# Patient Record
Sex: FEMALE | Race: WHITE | NOT HISPANIC OR LATINO | Employment: FULL TIME | URBAN - METROPOLITAN AREA
[De-identification: names, ages, dates, MRNs, and addresses within clinical notes are randomized per-mention and may not be internally consistent; named-entity substitution may affect disease eponyms.]

---

## 2017-01-13 ENCOUNTER — MYC MEDICAL ADVICE (OUTPATIENT)
Dept: FAMILY MEDICINE | Facility: CLINIC | Age: 32
End: 2017-01-13

## 2017-04-09 ENCOUNTER — MYC REFILL (OUTPATIENT)
Dept: FAMILY MEDICINE | Facility: CLINIC | Age: 32
End: 2017-04-09

## 2017-04-09 DIAGNOSIS — E03.9 HYPOTHYROIDISM, UNSPECIFIED TYPE: Primary | ICD-10-CM

## 2017-04-10 NOTE — TELEPHONE ENCOUNTER
Message from [employer]:  Original authorizing provider: GREY CHRISTENSEN MD, MD Jackie Juan would like a refill of the following medications:  levothyroxine (SYNTHROID, LEVOTHROID) 75 MCG tablet [GREY CHRISTENSEN MD, MD]    Preferred pharmacy: [facility] [address] [place] [address] - [address]    Comment:  Please let me know if I need to make a lab appointment in order to renew this prescription. Thanks.

## 2017-04-10 NOTE — TELEPHONE ENCOUNTER
levothyroxine (SYNTHROID, LEVOTHROID) 75 MCG tablet     Last Written Prescription Date: 4/20/2016  Last Quantity: 90, # refills: 3  Last Office Visit with [facility], [facility] or [facility] prescribing provider: 4/19/2016        TSH   Date Value Ref Range Status   04/19/2016 3.09 0.40 - 4.00 mU/L Final

## 2017-04-11 RX ORDER — LEVOTHYROXINE SODIUM 75 UG/1
75 TABLET ORAL DAILY
Qty: 30 TABLET | Refills: 0 | Status: SHIPPED | OUTPATIENT
Start: 2017-04-11 | End: 2017-05-10

## 2017-04-11 NOTE — TELEPHONE ENCOUNTER
Medication is being filled for 1 time refill only due to:  Patient needs labs tsh. Patient needs to be seen because it has been more than one year since last visit.     Kimberly Curry, RN, BSN

## 2017-04-13 ENCOUNTER — TELEPHONE (OUTPATIENT)
Dept: FAMILY MEDICINE | Facility: CLINIC | Age: 32
End: 2017-04-13

## 2017-04-13 NOTE — TELEPHONE ENCOUNTER
Summary:    Patient is due/failing the following:   LDL,Physical and PAP    Action needed:   Patient needs office visit for PAP. and Patient needs fasting lab only appointment    Type of outreach:    Phone, left message for patient to call back.     Questions for provider review:    None                                                                                                                                    Shelly Ngo       Chart routed to Care Team .          Panel Management Review      Patient has the following on her problem list: None      Composite cancer screening  Chart review shows that this patient is due/due soon for the following Pap Smear

## 2017-05-03 NOTE — PROGRESS NOTES
"   SUBJECTIVE:     CC: Jackie Juan is an 31 year old woman who presents for preventive health visit.     Healthy Habits:    Do you get at least three servings of calcium containing foods daily (dairy, green leafy vegetables, etc.)? {YES/NO, DAIRY INTAKE:359641::\"yes\"}    Amount of exercise or daily activities, outside of work: {AMOUNT EXERCISE:383089}    Problems taking medications regularly {Yes /No default:124922::\"No\"}    Medication side effects: {Yes /No default.:052817::\"No\"}    Have you had an eye exam in the past two years? {YESNOBLANK:233824}    Do you see a dentist twice per year? {YESNOBLANK:131790}    Do you have sleep apnea, excessive snoring or daytime drowsiness?{YESNOBLANK:221278}    {Outside tests to abstract? :941678}    {additional problems to add:807773}    Today's PHQ-2 Score:   PHQ-2 ( 1999 Pfizer) 4/19/2016 6/8/2015   Q1: Little interest or pleasure in doing things 0 0   Q2: Feeling down, depressed or hopeless 0 0   PHQ-2 Score 0 0   Little interest or pleasure in doing things - -   Feeling down, depressed or hopeless - -   PHQ-2 Score - -     {PHQ-2 LOOK IN ASSESSMENTS :042801}  Abuse: Current or Past(Physical, Sexual or Emotional)- {YES/NO/NA:039263}  Do you feel safe in your environment - {YES/NO/NA:833652}    Social History   Substance Use Topics     Smoking status: Former Smoker     Types: Cigarettes     Start date: 9/8/2011     Quit date: 12/31/2013     Smokeless tobacco: Never Used      Comment: Use eCig occasionally, smoke 1 or 2 cigarettes a month     Alcohol use Yes      Comment: occiasional     {ETOH AUDIT:751646}    Recent Labs   Lab Test 02/09/16 01/20/15 03/04/14 03/25/13 02/28/12   CHOL  131  143  141  144  120   HDL  72  72  59  67  50   LDL   --    --    --    --   61.4   TRIG   --    --   <45  <45  43   CHOLHDLRATIO   --    --   2.4  2.1  2.4   NHDL  60   --    --    --    --        Reviewed orders with patient.  Reviewed health maintenance and updated orders accordingly - " "{Yes/No:264102::\"Yes\"}    Mammo Decision Support:  {Mammo:516869}    Pertinent mammograms are reviewed under the imaging tab.  History of abnormal Pap smear: {PAP HX:371092}    Reviewed and updated as needed this visit by clinical staff         Reviewed and updated as needed this visit by Provider        {HISTORY OPTIONS:037438}    ROS:  {FEMALE PREVENTATIVE ROS:124040}    {CHRONICPROBDATA:856390}  OBJECTIVE:     There were no vitals taken for this visit.  EXAM:  {Exam Choices:519730}    ASSESSMENT/PLAN:     {Diag Picklist:679432}    COUNSELING:   {FEMALE COUNSELING MESSAGES:866776::\"Reviewed preventive health counseling, as reflected in patient instructions\"}    {Blood Pressure Screenin}     reports that she quit smoking about 3 years ago. Her smoking use included Cigarettes. She started smoking about 5 years ago. She has never used smokeless tobacco.  {Tobacco Cessation needed for ACO -- Delete if patient is a non-smoker:930315}  Estimated body mass index is 31.03 kg/(m^2) as calculated from the following:    Height as of 16: 5' 8.75\" (1.746 m).    Weight as of 16: 208 lb 9.6 oz (94.6 kg).   {Weight Management Plan needed for ACO:589079}    Counseling Resources:  ATP IV Guidelines  Pooled Cohorts Equation Calculator  Breast Cancer Risk Calculator  FRAX Risk Assessment  ICSI Preventive Guidelines  Dietary Guidelines for Americans,   USDA's MyPlate  ASA Prophylaxis  Lung CA Screening    GREY CHRISTENSEN MD, MD  [facility] [place]"

## 2017-05-10 ENCOUNTER — OFFICE VISIT (OUTPATIENT)
Dept: FAMILY MEDICINE | Facility: CLINIC | Age: 32
End: 2017-05-10
Payer: COMMERCIAL

## 2017-05-10 VITALS
HEIGHT: 69 IN | RESPIRATION RATE: 20 BRPM | TEMPERATURE: 98.2 F | WEIGHT: 223 LBS | HEART RATE: 80 BPM | DIASTOLIC BLOOD PRESSURE: 88 MMHG | SYSTOLIC BLOOD PRESSURE: 110 MMHG | BODY MASS INDEX: 33.03 KG/M2

## 2017-05-10 DIAGNOSIS — Z13.1 SCREENING FOR DIABETES MELLITUS: ICD-10-CM

## 2017-05-10 DIAGNOSIS — E78.5 HYPERLIPIDEMIA LDL GOAL <160: ICD-10-CM

## 2017-05-10 DIAGNOSIS — D68.51 FACTOR 5 LEIDEN MUTATION, HETEROZYGOUS (H): ICD-10-CM

## 2017-05-10 DIAGNOSIS — Z00.00 ENCOUNTER FOR ROUTINE ADULT HEALTH EXAMINATION WITHOUT ABNORMAL FINDINGS: Primary | ICD-10-CM

## 2017-05-10 DIAGNOSIS — E03.9 HYPOTHYROIDISM, UNSPECIFIED TYPE: ICD-10-CM

## 2017-05-10 DIAGNOSIS — B00.1 RECURRENT COLD SORES: ICD-10-CM

## 2017-05-10 DIAGNOSIS — Z30.09 GENERAL COUNSELING AND ADVICE FOR CONTRACEPTIVE MANAGEMENT: ICD-10-CM

## 2017-05-10 LAB
CHOLEST SERPL-MCNC: 167 MG/DL
GLUCOSE SERPL-MCNC: 91 MG/DL (ref 70–99)
HDLC SERPL-MCNC: 87 MG/DL
LDLC SERPL CALC-MCNC: 73 MG/DL
NONHDLC SERPL-MCNC: 80 MG/DL
TRIGL SERPL-MCNC: 37 MG/DL
TSH SERPL DL<=0.005 MIU/L-ACNC: 2.1 MU/L (ref 0.4–4)

## 2017-05-10 PROCEDURE — 36415 COLL VENOUS BLD VENIPUNCTURE: CPT | Performed by: FAMILY MEDICINE

## 2017-05-10 PROCEDURE — 99395 PREV VISIT EST AGE 18-39: CPT | Performed by: FAMILY MEDICINE

## 2017-05-10 PROCEDURE — G0145 SCR C/V CYTO,THINLAYER,RESCR: HCPCS | Performed by: FAMILY MEDICINE

## 2017-05-10 PROCEDURE — 84443 ASSAY THYROID STIM HORMONE: CPT | Performed by: FAMILY MEDICINE

## 2017-05-10 PROCEDURE — 82947 ASSAY GLUCOSE BLOOD QUANT: CPT | Performed by: FAMILY MEDICINE

## 2017-05-10 PROCEDURE — 87624 HPV HI-RISK TYP POOLED RSLT: CPT | Performed by: FAMILY MEDICINE

## 2017-05-10 PROCEDURE — 80061 LIPID PANEL: CPT | Performed by: FAMILY MEDICINE

## 2017-05-10 RX ORDER — LEVOTHYROXINE SODIUM 75 UG/1
75 TABLET ORAL DAILY
Qty: 90 TABLET | Refills: 3 | Status: SHIPPED | OUTPATIENT
Start: 2017-05-10 | End: 2018-05-17

## 2017-05-10 RX ORDER — ACYCLOVIR 400 MG/1
400 TABLET ORAL 2 TIMES DAILY
Qty: 10 TABLET | Refills: 6 | Status: SHIPPED | OUTPATIENT
Start: 2017-05-10 | End: 2018-09-03

## 2017-05-10 RX ORDER — LEVOTHYROXINE SODIUM 75 UG/1
75 TABLET ORAL DAILY
Qty: 30 TABLET | Refills: 0 | Status: CANCELLED | OUTPATIENT
Start: 2017-05-10

## 2017-05-10 ASSESSMENT — PAIN SCALES - GENERAL: PAINLEVEL: NO PAIN (0)

## 2017-05-10 NOTE — PROGRESS NOTES
SUBJECTIVE:     CC: Jackie Juan is an 31 year old woman who presents for preventive health visit.     Physical   Annual:     Getting at least 3 servings of Calcium per day::  Yes    Bi-annual eye exam::  Yes    Dental care twice a year::  Yes    Sleep apnea or symptoms of sleep apnea::  None    Diet::  Regular (no restrictions)    Frequency of exercise::  None    Taking medications regularly::  Yes    Medication side effects::  None    Additional concerns today::  No        Hypothyroidism Follow-up      Since last visit, patient describes the following symptoms: Weight stable, no hair loss, no skin changes, no constipation, no loose stools     CONTRACEPTION - options. Has Factor V Leiden. She is unable to be on oral contraceptives. She did not do well with Depo - had bleeding consistently. She has not used other forms of birth control.       Today's PHQ-2 Score:   PHQ-2 ( 1999 Pfizer) 5/7/2017   Little interest or pleasure in doing things Not at all   Feeling down, depressed or hopeless Not at all   PHQ-2 Score 0       Abuse: Current or Past(Physical, Sexual or Emotional)- No  Do you feel safe in your environment - Yes    Social History   Substance Use Topics     Smoking status: Former Smoker     Types: Cigarettes     Start date: 9/8/2011     Quit date: 12/31/2013     Smokeless tobacco: Never Used      Comment: Use eCig occasionally, smoke 1 or 2 cigarettes a month     Alcohol use Yes      Comment: occiasional     The patient does not drink >3 drinks per day nor >7 drinks per week.    Recent Labs   Lab Test 02/09/16 01/20/15 03/04/14 03/25/13 02/28/12   CHOL  131  143  141  144  120   HDL  72  72  59  67  50   LDL   --    --    --    --   61.4   TRIG   --    --   <45  <45  43   CHOLHDLRATIO   --    --   2.4  2.1  2.4   NHDL  60   --    --    --    --        Reviewed orders with patient.  Reviewed health maintenance and updated orders accordingly - Yes    Mammo Decision Support:  Mammogram not appropriate for  "this patient based on age.    Pertinent mammograms are reviewed under the imaging tab.  History of abnormal Pap smear: NO - age 30-65 PAP every 5 years with negative HPV co-testing recommended    Reviewed and updated as needed this visit by clinical staff  Tobacco  Allergies  Med Hx  Surg Hx  Fam Hx  Soc Hx        Reviewed and updated as needed this visit by Provider            ROS:  C: NEGATIVE for fever, chills, change in weight  I: NEGATIVE for worrisome rashes, moles or lesions  E: NEGATIVE for vision changes or irritation  ENT: NEGATIVE for ear, mouth and throat problems  R: NEGATIVE for significant cough or SOB  B: NEGATIVE for masses, tenderness or discharge  CV: NEGATIVE for chest pain, palpitations or peripheral edema  GI: NEGATIVE for nausea, abdominal pain, heartburn, or change in bowel habits  : NEGATIVE for unusual urinary or vaginal symptoms. Periods are regular.  M: NEGATIVE for significant arthralgias or myalgia  N: NEGATIVE for weakness, dizziness or paresthesias  P: NEGATIVE for changes in mood or affect    Problem list, Medication list, Allergies, and Medical/Social/Surgical histories reviewed in [facility] and updated as appropriate.  OBJECTIVE:     /88 (BP Location: Left arm, Cuff Size: Adult Large)  Pulse 80  Temp 98.2  F (36.8  C) (Temporal)  Resp 20  Ht 5' 9\" (1.753 m)  Wt 223 lb (101.2 kg)  LMP 05/01/2017  BMI 32.93 kg/m2  EXAM:  GENERAL: healthy, alert and no distress  EYES: Eyes grossly normal to inspection, PERRL and conjunctivae and sclerae normal  HENT: ear canals and TM's normal, nose and mouth without ulcers or lesions  NECK: no adenopathy, no asymmetry, masses, or scars and thyroid normal to palpation  RESP: lungs clear to auscultation - no rales, rhonchi or wheezes  BREAST: normal without masses, tenderness or nipple discharge and no palpable axillary masses or adenopathy  CV: regular rate and rhythm, normal S1 S2, no S3 or S4, no murmur, click or rub, no peripheral " edema and peripheral pulses strong  ABDOMEN: soft, nontender, no hepatosplenomegaly, no masses and bowel sounds normal   (female): normal female external genitalia, normal urethral meatus, vaginal mucosa pink, moist, well rugated, and normal cervix/adnexa/uterus without masses or discharge  MS: no gross musculoskeletal defects noted, no edema  SKIN: no suspicious lesions or rashes  NEURO: Normal strength and tone, mentation intact and speech normal  PSYCH: mentation appears normal, affect normal/bright      TSH   Date Value Ref Range Status   05/10/2017 2.10 0.40 - 4.00 mU/L Final   ]     ASSESSMENT/PLAN:     1. Encounter for routine adult health examination without abnormal findings  See counseling messages   Pap done. Will contact with results when available.    - Pap imaged thin layer screen with HPV - recommended age 30 - 65 years (select HPV order below)  - HPV High Risk Types DNA Cervical    2. Hypothyroidism, unspecified type  Well controlled with current medication. Continue same dose and recheck in one year.  - TSH with free T4 reflex  - levothyroxine (SYNTHROID/LEVOTHROID) 75 MCG tablet; Take 1 tablet (75 mcg) by mouth daily  Dispense: 90 tablet; Refill: 3    3. Factor 5 Leiden mutation, heterozygous (H)  No history of clots.   See counseling regarding contraception.     4. Recurrent cold sores  Does well with acyclovir as needed.   Refills given.   - acyclovir (ZOVIRAX) 400 MG tablet; Take 1 tablet (400 mg) by mouth 2 times daily for 5 days Reported on 5/10/2017  Dispense: 10 tablet; Refill: 6    5. General counseling and advice for contraceptive management  Discussed options of contraception including Depo, Mirena, Nexplanon, condoms.   She will consider her options and will call back for referral to gyn if wanting Mirena or nexplanon.   All questions invited, asked and answered to the patient's apparent satisfaction.  Patient agrees to plan.      6. Hyperlipidemia LDL goal <160  Will notify of  "results.   - Lipid Profile with reflex to direct LDL    7. Screening for diabetes mellitus  Will notify of results.   - Glucose    COUNSELING:  Reviewed preventive health counseling, as reflected in patient instructions  Special attention given to:        Regular exercise       Healthy diet/nutrition       Contraception         reports that she quit smoking about 3 years ago. Her smoking use included Cigarettes. She started smoking about 5 years ago. She has never used smokeless tobacco.    Estimated body mass index is 32.93 kg/(m^2) as calculated from the following:    Height as of this encounter: 5' 9\" (1.753 m).    Weight as of this encounter: 223 lb (101.2 kg).   Weight management plan: Discussed healthy diet and exercise guidelines and patient will follow up in 12 months in clinic to re-evaluate.    Counseling Resources:  ATP IV Guidelines  Pooled Cohorts Equation Calculator  Breast Cancer Risk Calculator  FRAX Risk Assessment  ICSI Preventive Guidelines  Dietary Guidelines for Americans, 2010  USDA's MyPlate  ASA Prophylaxis  Lung CA Screening    GREY CHRISTENSEN MD, MD  [facility]  "

## 2017-05-10 NOTE — NURSING NOTE
"Chief Complaint   Patient presents with     Physical     Panel Management     Pap, Lipids, TSH       Initial /88 (BP Location: Left arm, Cuff Size: Adult Large)  Pulse 80  Temp 98.2  F (36.8  C) (Temporal)  Resp 20  Ht 5' 9\" (1.753 m)  Wt 223 lb (101.2 kg)  LMP 05/01/2017  BMI 32.93 kg/m2 Estimated body mass index is 32.93 kg/(m^2) as calculated from the following:    Height as of this encounter: 5' 9\" (1.753 m).    Weight as of this encounter: 223 lb (101.2 kg).  Medication Reconciliation: complete       Tristian Florez MA    "

## 2017-05-10 NOTE — MR AVS SNAPSHOT
After Visit Summary   5/10/2017    Jackie Juan    MRN: [id number]           Patient Information     Date Of Birth          1985        Visit Information        Provider Department      5/10/2017 9:00 AM Sabrina Hdez MD [facility]        Today's Diagnoses     Encounter for routine adult health examination without abnormal findings    -  1    Hypothyroidism, unspecified type        Factor 5 Leiden mutation, heterozygous (H)        Hyperlipidemia LDL goal <160        Screening for diabetes mellitus        Recurrent cold sores          Care Instructions      Preventive Health Recommendations  Female Ages 26 - 39  Yearly exam:   See your health care provider every year in order to    Review health changes.     Discuss preventive care.      Review your medicines if you your doctor has prescribed any.    Until age 30: Get a Pap test every three years (more often if you have had an abnormal result).    After age 30: Talk to your doctor about whether you should have a Pap test every 3 years or have a Pap test with HPV screening every 5 years.   You do not need a Pap test if your uterus was removed (hysterectomy) and you have not had cancer.  You should be tested each year for STDs (sexually transmitted diseases), if you're at risk.   Talk to your provider about how often to have your cholesterol checked.  If you are at risk for diabetes, you should have a diabetes test (fasting glucose).  Shots: Get a flu shot each year. Get a tetanus shot every 10 years.   Nutrition:     Eat at least 5 servings of fruits and vegetables each day.    Eat whole-grain bread, whole-wheat pasta and brown rice instead of white grains and rice.    Talk to your provider about Calcium and Vitamin D.     Lifestyle    Exercise at least 150 minutes a week (30 minutes a day, 5 days of the week). This will help you control your weight and prevent disease.    Limit alcohol to one drink per day.    No smoking.     Wear  "sunscreen to prevent skin cancer.    See your dentist every six months for an exam and cleaning.          Follow-ups after your visit        Who to contact     If you have questions or need follow up information about today's clinic visit or your schedule please contact [facility] directly at [phone].  Normal or non-critical lab and imaging results will be communicated to you by MyChart, letter or phone within 4 business days after the clinic has received the results. If you do not hear from us within 7 days, please contact the clinic through [employer] or phone. If you have a critical or abnormal lab result, we will notify you by phone as soon as possible.  Submit refill requests through [employer] or call your pharmacy and they will forward the refill request to us. Please allow 3 business days for your refill to be completed.          Additional Information About Your Visit        MyChart Information     [employer] gives you secure access to your electronic health record. If you see a primary care provider, you can also send messages to your care team and make appointments. If you have questions, please call your primary care clinic.  If you do not have a primary care provider, please call [phone] and they will assist you.        Care EveryWhere ID     This is your Care EveryWhere ID. This could be used by other organizations to access your [facility] medical records  [id number]        Your Vitals Were     Pulse Temperature Respirations Height Last Period BMI (Body Mass Index)    80 98.2  F (36.8  C) (Temporal) 20 5' 9\" (1.753 m) 05/01/2017 32.93 kg/m2       Blood Pressure from Last 3 Encounters:   05/10/17 110/88   04/19/16 118/80   12/24/15 132/82    Weight from Last 3 Encounters:   05/10/17 223 lb (101.2 kg)   04/19/16 208 lb 9.6 oz (94.6 kg)   06/08/15 201 lb 4.8 oz (91.3 kg)              We Performed the Following     Glucose     HPV High Risk Types DNA Cervical     Lipid Profile with reflex to " direct LDL     Pap imaged thin layer screen with HPV - recommended age 30 - 65 years (select HPV order below)     TSH with free T4 reflex          Today's Medication Changes          These changes are accurate as of: 5/10/17  9:37 AM.  If you have any questions, ask your nurse or doctor.               These medicines have changed or have updated prescriptions.        Dose/Directions    acyclovir 400 MG tablet   Commonly known as:  ZOVIRAX   This may have changed:    - how much to take  - how to take this  - when to take this   Used for:  Recurrent cold sores   Changed by:  Sabrina Hdez MD        Dose:  400 mg   Take 1 tablet (400 mg) by mouth 2 times daily for 5 days Reported on 5/10/2017   Quantity:  10 tablet   Refills:  6         Stop taking these medicines if you haven't already. Please contact your care team if you have questions.     naproxen 500 MG tablet   Commonly known as:  NAPROSYN   Stopped by:  Sabrina Hdez MD                Where to get your medicines      These medications were sent to [id number] [place] - [address]  [address], [place] [address]    Hours:  test fax sent successfully 1/20/04  kr Phone:  [phone]     acyclovir 400 MG tablet                Primary Care Provider Office Phone # Fax #    Sabrina Hdez [phone]       [facility] [address]  [place] [address]        Thank you!     Thank you for choosing [facility]  for your care. Our goal is always to provide you with excellent care. Hearing back from our patients is one way we can continue to improve our services. Please take a few minutes to complete the written survey that you may receive in the mail after your visit with us. Thank you!             Your Updated Medication List - Protect others around you: Learn how to safely use, store and throw away your medicines at www.disposemymeds.org.          This list  is accurate as of: 5/10/17  9:37 AM.  Always use your most recent med list.                   Brand Name Dispense Instructions for use    acyclovir 400 MG tablet    ZOVIRAX    10 tablet    Take 1 tablet (400 mg) by mouth 2 times daily for 5 days Reported on 5/10/2017       levothyroxine 75 MCG tablet    SYNTHROID/LEVOTHROID    30 tablet    Take 1 tablet (75 mcg) by mouth daily

## 2017-05-12 LAB
COPATH REPORT: NORMAL
PAP: NORMAL

## 2017-05-15 LAB
FINAL DIAGNOSIS: NORMAL
HPV HR 12 DNA CVX QL NAA+PROBE: NEGATIVE
HPV16 DNA SPEC QL NAA+PROBE: NEGATIVE
HPV18 DNA SPEC QL NAA+PROBE: NEGATIVE
SPECIMEN DESCRIPTION: NORMAL

## 2017-09-27 ENCOUNTER — OFFICE VISIT (OUTPATIENT)
Dept: FAMILY MEDICINE | Facility: CLINIC | Age: 32
End: 2017-09-27
Payer: COMMERCIAL

## 2017-09-27 VITALS
RESPIRATION RATE: 16 BRPM | TEMPERATURE: 98.7 F | HEIGHT: 69 IN | SYSTOLIC BLOOD PRESSURE: 110 MMHG | BODY MASS INDEX: 33.15 KG/M2 | DIASTOLIC BLOOD PRESSURE: 80 MMHG | HEART RATE: 94 BPM | OXYGEN SATURATION: 99 % | WEIGHT: 223.8 LBS

## 2017-09-27 DIAGNOSIS — R07.0 THROAT PAIN: ICD-10-CM

## 2017-09-27 DIAGNOSIS — J02.9 VIRAL PHARYNGITIS: Primary | ICD-10-CM

## 2017-09-27 LAB
DEPRECATED S PYO AG THROAT QL EIA: NORMAL
SPECIMEN SOURCE: NORMAL

## 2017-09-27 PROCEDURE — 99213 OFFICE O/P EST LOW 20 MIN: CPT | Performed by: PHYSICIAN ASSISTANT

## 2017-09-27 PROCEDURE — 87880 STREP A ASSAY W/OPTIC: CPT | Performed by: PHYSICIAN ASSISTANT

## 2017-09-27 PROCEDURE — 87081 CULTURE SCREEN ONLY: CPT | Performed by: PHYSICIAN ASSISTANT

## 2017-09-27 RX ORDER — ACYCLOVIR 400 MG/1
TABLET ORAL
Refills: 6 | COMMUNITY
Start: 2017-08-24 | End: 2020-08-06

## 2017-09-27 ASSESSMENT — PAIN SCALES - GENERAL: PAINLEVEL: MODERATE PAIN (4)

## 2017-09-27 NOTE — NURSING NOTE
"Chief Complaint   Patient presents with     Pharyngitis       Initial /80  Pulse 94  Temp 98.7  F (37.1  C) (Temporal)  Resp 16  Ht 5' 9.13\" (1.756 m)  Wt 223 lb 12.8 oz (101.5 kg)  SpO2 99%  BMI 32.92 kg/m2 Estimated body mass index is 32.92 kg/(m^2) as calculated from the following:    Height as of this encounter: 5' 9.13\" (1.756 m).    Weight as of this encounter: 223 lb 12.8 oz (101.5 kg).  Medication Reconciliation: complete     Taryn Mccann MA       "

## 2017-09-27 NOTE — PATIENT INSTRUCTIONS
Rapid strep was negative.   Viruses can also cause sore throat illnesses and tonsillitis symptoms.  If fever is gone, likely on course of improvement    Will treat if strep culture is positive

## 2017-09-27 NOTE — MR AVS SNAPSHOT
After Visit Summary   9/27/2017    Jackie Juan    MRN: [id number]           Patient Information     Date Of Birth          1985        Visit Information        Provider Department      9/27/2017 8:00 AM Kimberly Mckeon PA-C [facility]        Today's Diagnoses     Throat pain    -  1      Care Instructions    Rapid strep was negative.   Viruses can also cause sore throat illnesses and tonsillitis symptoms.  If fever is gone, likely on course of improvement    Will treat if strep culture is positive            Follow-ups after your visit        Who to contact     If you have questions or need follow up information about today's clinic visit or your schedule please contact [facility] directly at [phone].  Normal or non-critical lab and imaging results will be communicated to you by Cinexiohart, letter or phone within 4 business days after the clinic has received the results. If you do not hear from us within 7 days, please contact the clinic through Cinexiohart or phone. If you have a critical or abnormal lab result, we will notify you by phone as soon as possible.  Submit refill requests through [employer] or call your pharmacy and they will forward the refill request to us. Please allow 3 business days for your refill to be completed.          Additional Information About Your Visit        MyChart Information     [employer] gives you secure access to your electronic health record. If you see a primary care provider, you can also send messages to your care team and make appointments. If you have questions, please call your primary care clinic.  If you do not have a primary care provider, please call [phone] and they will assist you.        Care EveryWhere ID     This is your Care EveryWhere ID. This could be used by other organizations to access your [place] medical records  [id number]        Your Vitals Were     Pulse Temperature Respirations Height Last Period Pulse  "Oximetry    94 98.7  F (37.1  C) (Temporal) 16 5' 9.13\" (1.756 m) 09/26/2017 99%    BMI (Body Mass Index)                   32.92 kg/m2            Blood Pressure from Last 3 Encounters:   09/27/17 110/80   05/10/17 110/88   04/19/16 118/80    Weight from Last 3 Encounters:   09/27/17 223 lb 12.8 oz (101.5 kg)   05/10/17 223 lb (101.2 kg)   04/19/16 208 lb 9.6 oz (94.6 kg)              We Performed the Following     Beta strep group A culture     Strep, Rapid Screen        Primary Care Provider Office Phone # Fax #    Sabrina Hdez [phone] [address] [facility] [address]        Equal Access to Services     [facility] : Hadii aad ku hadasho Somillicent, waaxda luqadaha, qaybta kaalmada adeflorence, braxton harry . So [facility] [phone].    ATENCIÓN: Si habla español, tiene a du disposición servicios gratuitos de asistencia lingüística. Neri al [phone].    We comply with applicable federal civil rights laws and [place] laws. We do not discriminate on the basis of race, color, national origin, age, disability sex, sexual orientation or gender identity.            Thank you!     Thank you for choosing [facility]  for your care. Our goal is always to provide you with excellent care. Hearing back from our patients is one way we can continue to improve our services. Please take a few minutes to complete the written survey that you may receive in the mail after your visit with us. Thank you!             Your Updated Medication List - Protect others around you: Learn how to safely use, store and throw away your medicines at www.disposemymeds.org.          This list is accurate as of: 9/27/17  8:31 AM.  Always use your most recent med list.                   Brand Name Dispense Instructions for use Diagnosis    * acyclovir 400 MG tablet    ZOVIRAX    10 tablet    Take 1 tablet (400 mg) by mouth 2 times daily for 5 days Reported on 5/10/2017    " Recurrent cold sores       * acyclovir 400 MG tablet    ZOVIRAX     TK 1 T PO BID FOR 5 DAYS        levothyroxine 75 MCG tablet    SYNTHROID/LEVOTHROID    90 tablet    Take 1 tablet (75 mcg) by mouth daily    Hypothyroidism, unspecified type       * Notice:  This list has 2 medication(s) that are the same as other medications prescribed for you. Read the directions carefully, and ask your doctor or other care provider to review them with you.

## 2017-09-27 NOTE — PROGRESS NOTES
SUBJECTIVE:   Jackie Juan is a 32 year old female who presents to clinic today for the following health issues:    Sore Throat    Acute illness concerns:   Onset: Sunday afternoon   Feeling a little better today. Was planning to go to work today. But then saw white spots on tonsils and thought should be checked.     Fever: YES- 100.4. Fever gone this am.     Chills/Sweats: YES    Headache (location?): no     Sinus Pressure:YES- mild    Conjunctivitis:  no    Ear Pain: clogged ears     Rhinorrhea: no     Congestion: YES- mild    Sore Throat: YES- worst sx     Cough: no    Wheeze: no    Decreased Appetite: YES    Nausea: no    Vomiting: no    Diarrhea:  YES    Dysuria/Freq.: no     Fatigue/Achiness: YES    Sick/Strep Exposure: no     No rashes  No abd pain  Patient's last menstrual period was 09/26/2017.    Hx of strep in childhood.   No known strep exposure.    Work- design  for med devices.       Therapies Tried and outcome: Nyquil and Dayquil , also patient has been gargling salt water. Little improvement.       Hyperlipidemia Follow-Up      Rate your low fat/cholesterol diet?: not monitoring fat    Taking statin?  No  Other lipid medications/supplements?:  Fish oil pills when patient  remembers to take them , on and off       Problem list and histories reviewed & adjusted, as indicated.  Additional history: as documented    Patient Active Problem List   Diagnosis     Hypothyroidism     Factor 5 Leiden mutation, heterozygous (H)     Hyperlipidemia LDL goal <160     Past Surgical History:   Procedure Laterality Date     NO HISTORY OF SURGERY         Social History   Substance Use Topics     Smoking status: Former Smoker     Types: Cigarettes     Start date: 9/8/2011     Quit date: 12/31/2013     Smokeless tobacco: Never Used      Comment: Use eCig occasionally, smoke 1 or 2 cigarettes a month     Alcohol use Yes      Comment: occiasional     Family History   Problem Relation Age of Onset      "Heart Failure Father      CHF     Hypertension Father      Thyroid Disease Father      Thyroid Disease Father      Hypertension Brother      Hypertension Mother      Thyroid Disease Mother      Depression Mother      Thyroid Disease Mother      Hypertension Brother      Breast Cancer Paternal Grandmother          Current Outpatient Prescriptions   Medication Sig Dispense Refill     acyclovir (ZOVIRAX) 400 MG tablet TK 1 T PO BID FOR 5 DAYS  6     levothyroxine (SYNTHROID/LEVOTHROID) 75 MCG tablet Take 1 tablet (75 mcg) by mouth daily 90 tablet 3     Allergies   Allergen Reactions     Shellfish Allergy      BP Readings from Last 3 Encounters:   09/27/17 110/80   05/10/17 110/88   04/19/16 118/80    Wt Readings from Last 3 Encounters:   09/27/17 223 lb 12.8 oz (101.5 kg)   05/10/17 223 lb (101.2 kg)   04/19/16 208 lb 9.6 oz (94.6 kg)                  Labs reviewed in EPIC        Reviewed and updated as needed this visit by clinical staff     Reviewed and updated as needed this visit by Provider         ROS:  As above    OBJECTIVE:     /80  Pulse 94  Temp 98.7  F (37.1  C) (Temporal)  Resp 16  Ht 5' 9.13\" (1.756 m)  Wt 223 lb 12.8 oz (101.5 kg)  LMP 09/26/2017  SpO2 99%  BMI 32.92 kg/m2  Body mass index is 32.92 kg/(m^2).  GENERAL:well appearing, alert and no distress, nontoxic  EYES: Eyes grossly normal to inspection, PERRL and conjunctivae and sclerae normal  HENT: Normal cephalic/atraumatic. Sinuses nontender. Ear canals clear and TM's normal, no effusion. Nose mucosa no erythema and turbinates normal. Lips normal, no lesions. Buccal muscosa moist. Soft palate no lesions or ulcers. Tonsilar archs mild erythema, tonsils 2+, +exudates, +erythema. Uvula midline. Posterior oropharynx mild erythema.   NECK: mild tonsillar denopathy but no significant ant/post chain lad, and no asymmetry, masses, or scars  RESP: lungs clear to auscultation - no rales, rhonchi or wheezes  CV: regular rate and rhythm, normal " S1 S2, no S3 or S4, no murmur, click or rub  GI: soft non-tender, no HSM  SKIN: no suspicious lesions or rashes      Diagnostic Test Results:  Results for orders placed or performed in visit on 09/27/17 (from the past 24 hour(s))   Strep, Rapid Screen   Result Value Ref Range    Specimen Description Throat     Rapid Strep A Screen       NEGATIVE: No Group A streptococcal antigen detected by immunoassay, await culture report.       ASSESSMENT/PLAN:       1. Viral pharyngitis  Feeling better and fever has resolved  Symptomatic management  Discussed viral pharyngitis causes.   S/sx for f/u discussed.    2. Throat pain  Negative rapid strep, will culture and treat if positive.   - Strep, Rapid Screen  - Beta strep group A culture    Follow Up: For worsening symptoms (ie new fevers, worsening pain, etc), non-improvement as expected/discussed, questions regarding your medications or treatment plan. Discussed parameters for follow up and included in After Visit Summary given to patient.      Kimberly Mckeon PA-C  [facility]

## 2017-09-28 LAB
BACTERIA SPEC CULT: NORMAL
SPECIMEN SOURCE: NORMAL

## 2018-05-17 DIAGNOSIS — E03.9 HYPOTHYROIDISM, UNSPECIFIED TYPE: ICD-10-CM

## 2018-05-17 NOTE — LETTER
[facility]  [address]  [place] [address]  [phone]      May 22, 2018    Jackie Juan                                                                                                                     [address] [address] [place] [address]        Dear Jackie,    We have attempted to contact you by telephone without success.  We have sent a one time refill for the medication levothyroxine to the pharmacy for you.  In order to continue refilling this medication, we will need you to schedule an appointment for an office visit with your Provider.  You can call us at [phone] to schedule this appointment.    Sincerely,     [facility] Support Staff / sara

## 2018-05-18 RX ORDER — LEVOTHYROXINE SODIUM 75 UG/1
TABLET ORAL
Qty: 30 TABLET | Refills: 0 | Status: SHIPPED | OUTPATIENT
Start: 2018-05-18 | End: 2018-07-03

## 2018-05-18 NOTE — TELEPHONE ENCOUNTER
Please call and schedule lab appointment.     Levothyroxine-   Medication is being filled for 1 time refill only due to:  Patient needs labs TSH. Future labs ordered TSH.   Veena Bains, RN, BSN

## 2018-06-07 NOTE — PROGRESS NOTES
SUBJECTIVE:   CC: Jackie Juan is an 32 year old woman who presents for preventive health visit.     Physical   Annual:     Getting at least 3 servings of Calcium per day::  Yes    Bi-annual eye exam::  Yes    Dental care twice a year::  Yes    Sleep apnea or symptoms of sleep apnea::  None    Diet::  Regular (no restrictions)    Frequency of exercise::  1 day/week    Duration of exercise::  15-30 minutes    Taking medications regularly::  Yes    Medication side effects::  None    Additional concerns today::  YES          Her and her  have been doing a fasting diet together. She has lost about 4 pounds so far, but it is fairly easy for her to follow, so she is hopeful.     Hyperlipidemia Follow-Up      Rate your low fat/cholesterol diet?: fair    Taking statin?  No    Other lipid medications/supplements?:  none    Hypothyroidism Follow-up      Since last visit, patient describes the following symptoms: Weight stable, no hair loss, no skin changes, no constipation, no loose stools    Dysmenorrhea    She has had lighter than normal periods since last year when she skipped a period, then skipped a period again, so she had two periods in a row that were twice as long in between as normal.       Today's PHQ-2 Score:   PHQ-2 ( 1999 [employer]) 6/12/2018   Q1: Little interest or pleasure in doing things 0   Q2: Feeling down, depressed or hopeless 0   PHQ-2 Score 0   Q1: Little interest or pleasure in doing things Not at all   Q2: Feeling down, depressed or hopeless Not at all   PHQ-2 Score 0       Abuse: Current or Past(Physical, Sexual or Emotional)- No  Do you feel safe in your environment - Yes    Social History   Substance Use Topics     Smoking status: Light Tobacco Smoker     Types: Cigarettes     Start date: 9/8/2011     Last attempt to quit: 12/31/2013     Smokeless tobacco: Never Used      Comment:  smoke 1 or 2 cigarettes a month     Alcohol use Yes      Comment: occiasional       Reviewed orders with  patient.  Reviewed health maintenance and updated orders accordingly - Yes  Labs reviewed in EPIC  BP Readings from Last 3 Encounters:   06/12/18 122/78   09/27/17 110/80   05/10/17 110/88    Wt Readings from Last 3 Encounters:   06/12/18 102.6 kg (226 lb 1.6 oz)   09/27/17 101.5 kg (223 lb 12.8 oz)   05/10/17 101.2 kg (223 lb)                    Mammogram not appropriate for this patient based on age.    Pertinent mammograms are reviewed under the imaging tab.  History of abnormal Pap smear: NO - age 30-65 PAP every 5 years with negative HPV co-testing recommended    Reviewed and updated as needed this visit by clinical staff  Tobacco  Allergies  Meds  Med Hx  Surg Hx  Fam Hx  Soc Hx      Reviewed and updated as needed this visit by Provider  Meds        Past Medical History:   Diagnosis Date     Factor 5 Leiden mutation, heterozygous 2/11/2014     Factor 5 Leiden mutation, heterozygous (H)      Thyroid disease       Past Surgical History:   Procedure Laterality Date     NO HISTORY OF SURGERY         Review of Systems  CONSTITUTIONAL: NEGATIVE for fever, chills, change in weight  INTEGUMENTARU/SKIN: NEGATIVE for worrisome rashes, moles or lesions  EYES: NEGATIVE for vision changes or irritation  ENT: NEGATIVE for ear, mouth and throat problems  RESP: NEGATIVE for significant cough or SOB  BREAST: NEGATIVE for masses, tenderness or discharge  CV: NEGATIVE for chest pain, palpitations or peripheral edema  GI: NEGATIVE for nausea, abdominal pain, heartburn, or change in bowel habits  : NEGATIVE for unusual urinary or vaginal symptoms. Periods are regular.  MUSCULOSKELETAL: NEGATIVE for significant arthralgias or myalgia  NEURO: NEGATIVE for weakness, dizziness or paresthesias  PSYCHIATRIC: NEGATIVE for changes in mood or affect    This document serves as a record of the services and decisions personally performed and made by Sabrina Hdez MD. It was created on her behalf by Anju Samuel, a trained medical  "scribe. The creation of this document is based the provider's statements to the medical scribe.  Anju Samuel, June 12, 2018 3:25 PM        OBJECTIVE:   /78  Pulse 61  Temp 98  F (36.7  C) (Temporal)  Resp 16  Ht 1.753 m (5' 9\")  Wt 102.6 kg (226 lb 1.6 oz)  LMP 05/09/2018 (Exact Date)  SpO2 100%  BMI 33.39 kg/m2  Physical Exam  GENERAL: healthy, alert and no distress  EYES: Eyes grossly normal to inspection, PERRL and conjunctivae and sclerae normal  HENT: ear canals and TM's normal, nose and mouth without ulcers or lesions  NECK: no adenopathy, no asymmetry, masses, or scars and thyroid normal to palpation  RESP: lungs clear to auscultation - no rales, rhonchi or wheezes  BREAST: normal without masses, tenderness or nipple discharge and no palpable axillary masses or adenopathy  CV: regular rate and rhythm, normal S1 S2, no S3 or S4, no murmur, click or rub, no peripheral edema and peripheral pulses strong  ABDOMEN: soft, nontender, no hepatosplenomegaly, no masses and bowel sounds normal   (female): normal female external genitalia, normal urethral meatus, vaginal mucosa pink, moist, well rugated, and normal cervix/adnexa/uterus without masses or discharge; no PAP smear performed.   MS: no gross musculoskeletal defects noted, no edema  SKIN: no suspicious lesions or rashes  NEURO: Normal strength and tone, mentation intact and speech normal  PSYCH: mentation appears normal, affect normal/bright    ASSESSMENT/PLAN:   1. Routine general medical examination at a health care facility  See counseling messages     2. Hypothyroidism, unspecified type  Pt doing well on current medication and treatment plan. No change at this time.  - **TSH with free T4 reflex FUTURE 6mo    3. Screening for human immunodeficiency virus  Due for one time HIV screen.   - HIV Antigen Antibody Combo    4. Hyperlipidemia LDL goal <160  5. Screening for diabetes mellitus  Patient due for annual cholesterol and diabetes screening. " "Patient will be informed of the results of these tests.   - Lipid panel reflex to direct LDL Fasting  - Glucose    COUNSELING:  Reviewed preventive health counseling, as reflected in patient instructions     reports that she has been smoking Cigarettes.  She started smoking about 6 years ago. She has never used smokeless tobacco.    Estimated body mass index is 33.39 kg/(m^2) as calculated from the following:    Height as of this encounter: 1.753 m (5' 9\").    Weight as of this encounter: 102.6 kg (226 lb 1.6 oz).   Weight management plan: Discussed healthy diet and exercise guidelines and patient will follow up in 12 months in clinic to re-evaluate.    Counseling Resources:  ATP IV Guidelines  Pooled Cohorts Equation Calculator  Breast Cancer Risk Calculator  FRAX Risk Assessment  ICSI Preventive Guidelines  Dietary Guidelines for Americans, 2010  USDA's MyPlate  ASA Prophylaxis  Lung CA Screening    The information in this document, created by the medical scribe for me, accurately reflects the services I personally performed and the decisions made by me. I have reviewed and approved this document for accuracy.     GREY CHRISTENSEN MD, MD  [facility] [place]  "

## 2018-06-12 ENCOUNTER — OFFICE VISIT (OUTPATIENT)
Dept: FAMILY MEDICINE | Facility: CLINIC | Age: 33
End: 2018-06-12
Payer: COMMERCIAL

## 2018-06-12 VITALS
DIASTOLIC BLOOD PRESSURE: 78 MMHG | OXYGEN SATURATION: 100 % | HEIGHT: 69 IN | RESPIRATION RATE: 16 BRPM | TEMPERATURE: 98 F | WEIGHT: 226.1 LBS | BODY MASS INDEX: 33.49 KG/M2 | SYSTOLIC BLOOD PRESSURE: 122 MMHG | HEART RATE: 61 BPM

## 2018-06-12 DIAGNOSIS — Z13.1 SCREENING FOR DIABETES MELLITUS: ICD-10-CM

## 2018-06-12 DIAGNOSIS — E03.9 HYPOTHYROIDISM, UNSPECIFIED TYPE: ICD-10-CM

## 2018-06-12 DIAGNOSIS — Z00.00 ROUTINE GENERAL MEDICAL EXAMINATION AT A HEALTH CARE FACILITY: Primary | ICD-10-CM

## 2018-06-12 DIAGNOSIS — E78.5 HYPERLIPIDEMIA LDL GOAL <160: ICD-10-CM

## 2018-06-12 DIAGNOSIS — Z11.4 SCREENING FOR HUMAN IMMUNODEFICIENCY VIRUS: ICD-10-CM

## 2018-06-12 LAB
CHOLEST SERPL-MCNC: 145 MG/DL
GLUCOSE SERPL-MCNC: 84 MG/DL (ref 70–99)
HDLC SERPL-MCNC: 64 MG/DL
LDLC SERPL CALC-MCNC: 68 MG/DL
NONHDLC SERPL-MCNC: 81 MG/DL
TRIGL SERPL-MCNC: 66 MG/DL
TSH SERPL DL<=0.005 MIU/L-ACNC: 3.77 MU/L (ref 0.4–4)

## 2018-06-12 PROCEDURE — 82947 ASSAY GLUCOSE BLOOD QUANT: CPT | Performed by: FAMILY MEDICINE

## 2018-06-12 PROCEDURE — 36415 COLL VENOUS BLD VENIPUNCTURE: CPT | Performed by: FAMILY MEDICINE

## 2018-06-12 PROCEDURE — 99395 PREV VISIT EST AGE 18-39: CPT | Performed by: FAMILY MEDICINE

## 2018-06-12 PROCEDURE — 84443 ASSAY THYROID STIM HORMONE: CPT | Performed by: FAMILY MEDICINE

## 2018-06-12 PROCEDURE — 87389 HIV-1 AG W/HIV-1&-2 AB AG IA: CPT | Performed by: FAMILY MEDICINE

## 2018-06-12 PROCEDURE — 80061 LIPID PANEL: CPT | Performed by: FAMILY MEDICINE

## 2018-06-12 RX ORDER — LEVOTHYROXINE SODIUM 75 UG/1
TABLET ORAL
Qty: 30 TABLET | Refills: 0 | Status: CANCELLED | OUTPATIENT
Start: 2018-06-12

## 2018-06-12 ASSESSMENT — PAIN SCALES - GENERAL: PAINLEVEL: NO PAIN (0)

## 2018-06-12 NOTE — MR AVS SNAPSHOT
After Visit Summary   6/12/2018    Jackie Juan    MRN: [id number]           Patient Information     Date Of Birth          1985        Visit Information        Provider Department      6/12/2018 3:00 PM Sabrina Hdez MD [facility] [place]        Today's Diagnoses     Routine general medical examination at a health care facility    -  1    Hypothyroidism, unspecified type        Screening for human immunodeficiency virus        Hyperlipidemia LDL goal <160        Screening for diabetes mellitus          Care Instructions      Preventive Health Recommendations  Female Ages 26 - 39  Yearly exam:   See your health care provider every year in order to    Review health changes.     Discuss preventive care.      Review your medicines if you your doctor has prescribed any.    Until age 30: Get a Pap test every three years (more often if you have had an abnormal result).    After age 30: Talk to your doctor about whether you should have a Pap test every 3 years or have a Pap test with HPV screening every 5 years.   You do not need a Pap test if your uterus was removed (hysterectomy) and you have not had cancer.  You should be tested each year for STDs (sexually transmitted diseases), if you're at risk.   Talk to your provider about how often to have your cholesterol checked.  If you are at risk for diabetes, you should have a diabetes test (fasting glucose).  Shots: Get a flu shot each year. Get a tetanus shot every 10 years.   Nutrition:     Eat at least 5 servings of fruits and vegetables each day.    Eat whole-grain bread, whole-wheat pasta and brown rice instead of white grains and rice.    Talk to your provider about Calcium and Vitamin D.     Lifestyle    Exercise at least 150 minutes a week (30 minutes a day, 5 days of the week). This will help you control your weight and prevent disease.    Limit alcohol to one drink per day.    No smoking.     Wear sunscreen to prevent skin  "cancer.    See your dentist every six months for an exam and cleaning.            Follow-ups after your visit        Follow-up notes from your care team     Return in about 1 year (around 6/12/2019) for Physical Exam.      Who to contact     If you have questions or need follow up information about today's clinic visit or your schedule please contact [facility] directly at [phone].  Normal or non-critical lab and imaging results will be communicated to you by Endrahart, letter or phone within 4 business days after the clinic has received the results. If you do not hear from us within 7 days, please contact the clinic through [employer] or phone. If you have a critical or abnormal lab result, we will notify you by phone as soon as possible.  Submit refill requests through Adly or call your pharmacy and they will forward the refill request to us. Please allow 3 business days for your refill to be completed.          Additional Information About Your Visit        Endrahart Information     Adly gives you secure access to your electronic health record. If you see a primary care provider, you can also send messages to your care team and make appointments. If you have questions, please call your primary care clinic.  If you do not have a primary care provider, please call [phone] and they will assist you.        Care EveryWhere ID     This is your Care EveryWhere ID. This could be used by other organizations to access your [place] medical records  [id number]        Your Vitals Were     Pulse Temperature Respirations Height Last Period Pulse Oximetry    61 98  F (36.7  C) (Temporal) 16 5' 9\" (1.753 m) 05/09/2018 (Exact Date) 100%    BMI (Body Mass Index)                   33.39 kg/m2            Blood Pressure from Last 3 Encounters:   06/12/18 122/78   09/27/17 110/80   05/10/17 110/88    Weight from Last 3 Encounters:   06/12/18 226 lb 1.6 oz (102.6 kg)   09/27/17 223 lb 12.8 oz (101.5 kg) "   05/10/17 223 lb (101.2 kg)              We Performed the Following     **TSH with free T4 reflex FUTURE 6mo     Glucose     HIV Antigen Antibody Combo     Lipid panel reflex to direct LDL Fasting        Primary Care Provider Office Phone # Fax #    Sabrina Hdez [phone] [address]  [place] [address]        Equal Access to Services     [facility]: Hadii aad ku hadasho Soomaali, waaxda luqadaha, qaybta kaalmada adeegyada, waxay idiin hayaan adeeg kharash la'aan ah. So [facility] [phone].    ATENCIÓN: Si habla español, tiene a du disposición servicios gratuitos de asistencia lingüística. Llame al [phone].    We comply with applicable federal civil rights laws and [place] laws. We do not discriminate on the basis of race, color, national origin, age, disability, sex, sexual orientation, or gender identity.            Thank you!     Thank you for choosing [facility]  for your care. Our goal is always to provide you with excellent care. Hearing back from our patients is one way we can continue to improve our services. Please take a few minutes to complete the written survey that you may receive in the mail after your visit with us. Thank you!             Your Updated Medication List - Protect others around you: Learn how to safely use, store and throw away your medicines at www.disposemymeds.org.          This list is accurate as of 6/12/18  3:36 PM.  Always use your most recent med list.                   Brand Name Dispense Instructions for use Diagnosis    acyclovir 400 MG tablet    ZOVIRAX     TK 1 T PO BID FOR 5 DAYS        levothyroxine 75 MCG tablet    SYNTHROID/LEVOTHROID    30 tablet    TAKE 1 TABLET(75 MCG) BY MOUTH DAILY    Hypothyroidism, unspecified type

## 2018-06-13 LAB — HIV 1+2 AB+HIV1 P24 AG SERPL QL IA: NONREACTIVE

## 2018-07-03 DIAGNOSIS — E03.9 HYPOTHYROIDISM, UNSPECIFIED TYPE: ICD-10-CM

## 2018-07-03 RX ORDER — LEVOTHYROXINE SODIUM 75 UG/1
TABLET ORAL
Qty: 90 TABLET | Refills: 3 | Status: SHIPPED | OUTPATIENT
Start: 2018-07-03 | End: 2019-07-10

## 2018-07-03 NOTE — TELEPHONE ENCOUNTER
Levothyroxine    Prescription approved per [facility] Refill Protocol.    Harmony Fregoso, RN, BSN

## 2019-04-20 ENCOUNTER — VIRTUAL VISIT (OUTPATIENT)
Dept: FAMILY MEDICINE | Facility: OTHER | Age: 34
End: 2019-04-20

## 2019-04-20 NOTE — PROGRESS NOTES
"Date:   Clinician: Pardeep Gutierrez  Clinician NPI: [id number]  Patient: Jackie Juan  Patient : 1985  Patient Address: [address] [place] [id number]  Patient Phone: [phone]  Visit Protocol: Eye conditions  Patient Summary:  Jackie is a 33 year old (: 1985 ) female who initiated a Visit for conjunctivitis.  When asked the question \"Please sign me up to receive news, health information and promotions from [employer].\", Jackie responded \"No\".    Images of her eye condition were uploaded.   Her symptoms started 1-2 days ago and affect the right eye. The symptoms consist of drainage coming from the eye(s), eye redness, eyelid swelling, and itchy eye(s). Additionally, her vision has become more blurry since her symptoms started, but it's possible the blurry vision is caused by the eyelid swelling and/or drainage coming from the eye(s).   Symptom details     Drainage: The color of the drainage coming out of her eye(s) is yellow. The drainage is thick and causes her eyelids to be stuck shut in the morning.    Itchiness: Jackie does not have seasonal allergies or hay fever.     Denied symptoms include light sensitivity, bumps on the eyelid, and eye pain. Jackie does not have subconjunctival hemorrhage. She does not feel feverish.   Precipitating events  Jackie wears contact lenses. She has not slept in them in the past week. In the past 2 weeks, she has had a cold or an ear infection.   Jackie has not had a recent diagnosis of conjunctivitis and has not been exposed to someone with a red eye or an eye infection. She also has not had a recent eye surgery, foreign body in the eye(s), and eye injury.   Pertinent medical history  Jackie has not ever been diagnosed with glaucoma.   Jackie is not taking medication to treat her current symptoms.   Jackie does not require proof of evaluation of her eye condition before returning to school, work, or .   Jackie does not smoke or use smokeless tobacco.   She denies " "pregnancy and denies breastfeeding. She has menstruated in the past month.   MEDICATIONS: levothyroxine oral, ALLERGIES: NKDA  Clinician Response:  Dear Jackie,  Based on the information provided, you most likely have bacterial conjunctivitis, more commonly called pink eye.  Medication information  I am prescribing:  Ofloxacin (Ocuflox) 0.3% ophthalmic (eye) drops. Apply 1-2 drops into the affected eye(s) 4 times per day for 5-7 days. There are no refills with this prescription.  The medication I prescribed is an antibiotic medication. Infections can be caused by either bacteria or a virus, and often have similar symptoms, so it is possible that this is a viral infection. Antibiotics are only effective against bacterial infections, so when it is caused by a virus, the medication will not help symptoms improve or make it less contagious.  Self care  To reduce the spread of the eye infection, you should not wear contacts or use eye makeup until the infection has fully resolved, and be sure to wash your hands at least once per hour and avoid touching the eyes as much as possible.  The following will reduce the risk for future eye infections:     Frequent handwashing    Replace towels and washcloths daily    Do not share towels and washcloths with others    Replace contacts and cases used while eyes were infected    Do not sleep in contacts that are not FDA-approved for overnight wear    Do not reuse or \"top off\" contact solution    Replace eye makeup used while eyes were infected    Do not use anyone else's eye makeup     Steps you can take to be as comfortable as possible:     Avoid rubbing your eyes    Apply a cool compress to the eye(s)    Take regular breaks and remember to blink regularly when reading or using a computer for long periods of time    Wear sunglasses when outside    Wear eye protection when swimming or working with chemicals    Use good lighting     When to seek care  Please make an appointment to be " seen in a clinic or urgent care if any of the following occurs:     You develop new symptoms or your symptoms becomes worse.    Your symptoms do not improve within 2 days of starting treatment.      Diagnosis: Bacterial conjunctivitis  Diagnosis ICD: H10.9  Additional Clinician Notes: I recommend not wear contacts for 1wk and change contacts to a new pair.  Prescription: ofloxacin (Ocuflox) 0.3 % ophthalmic (eye) drops 1 5 ml dropper bottle, 7 days supply. Apply 1-2 drops into the affected eye(s) 4 times per day for 5-7 days. Refills: 0, Refill as needed: no, Allow substitutions: yes  Pharmacy: [facility] [id number] - [phone] - [id number] [place] [address]

## 2019-07-05 NOTE — PROGRESS NOTES
"   SUBJECTIVE:   CC: Jackie Juan is an 33 year old woman who presents for preventive health visit.     Healthy Habits:     Getting at least 3 servings of Calcium per day:  Yes    Bi-annual eye exam:  Yes    Dental care twice a year:  Yes    Sleep apnea or symptoms of sleep apnea:  None    Diet:  Regular (no restrictions) and Breakfast skipped    Frequency of exercise:  1 day/week    Duration of exercise:  15-30 minutes    Taking medications regularly:  Yes    Medication side effects:  None    PHQ-2 Total Score: 0    Additional concerns today:  Yes    Concern - \"bump on knee\"  Onset: a few weeks    Description:   Bump on knee - the size of a pea    Intensity: mild    Progression of Symptoms:  improving    Accompanying Signs & Symptoms:  It was previously painful   \"felt like a bruise and then noticed a bump there - not sure if I hit it on something\"    Previous history of similar problem:   no    Precipitating factors:   Worsened by: no    Alleviating factors:  Improved by: no    Therapies Tried and outcome: NA  The patient states that she has a bump on her knee. She notes that it hurt a week ago, but states that the pain went away. She declines hitting the knee anywhere or having this occur before.     Exercise   The patient states that she tries to walk for 25 minutes on her lunch time. She notes that she is often unable to go the gym.     Thyroid   The patient states that she would like to have her thyroid medication refilled.     Today's PHQ-2 Score:   PHQ-2 ( 1999 Pfizer) 7/9/2019   Q1: Little interest or pleasure in doing things 0   Q2: Feeling down, depressed or hopeless 0   PHQ-2 Score 0   Q1: Little interest or pleasure in doing things Not at all   Q2: Feeling down, depressed or hopeless Not at all   PHQ-2 Score 0       Abuse: Current or Past(Physical, Sexual or Emotional)- No  Do you feel safe in your environment? Yes    Social History     Tobacco Use     Smoking status: Light Tobacco Smoker     Types: " Cigarettes     Start date: 2011     Last attempt to quit: 2013     Years since quittin.5     Smokeless tobacco: Never Used     Tobacco comment:  smoke 1 or 2 cigarettes a month   Substance Use Topics     Alcohol use: Yes     Comment: occiasional     If you drink alcohol do you typically have >3 drinks per day or >7 drinks per week? No    No flowsheet data found.    Reviewed orders with patient.  Reviewed health maintenance and updated orders accordingly - Yes  BP Readings from Last 3 Encounters:   07/10/19 122/84   18 122/78   17 110/80    Wt Readings from Last 3 Encounters:   07/10/19 105.7 kg (233 lb)   18 102.6 kg (226 lb 1.6 oz)   17 101.5 kg (223 lb 12.8 oz)                  Patient Active Problem List   Diagnosis     Hypothyroidism     Factor 5 Leiden mutation, heterozygous (H)     Hyperlipidemia LDL goal <160     Past Surgical History:   Procedure Laterality Date     NO HISTORY OF SURGERY         Social History     Tobacco Use     Smoking status: Light Tobacco Smoker     Types: Cigarettes     Start date: 2011     Last attempt to quit: 2013     Years since quittin.5     Smokeless tobacco: Never Used     Tobacco comment:  smoke 1 or 2 cigarettes a month   Substance Use Topics     Alcohol use: Yes     Comment: occiasional     Family History   Problem Relation Age of Onset     Heart Failure Father         CHF     Hypertension Father      Thyroid Disease Father      Thyroid Disease Father      Hypertension Brother      Hypertension Mother      Thyroid Disease Mother      Depression Mother      Thyroid Disease Mother      Hypertension Brother      Breast Cancer Paternal Grandmother          Current Outpatient Medications   Medication Sig Dispense Refill     acyclovir (ZOVIRAX) 400 MG tablet TK 1 T PO BID FOR 5 DAYS  6     levothyroxine (SYNTHROID/LEVOTHROID) 75 MCG tablet TAKE 1 TABLET(75 MCG) BY MOUTH DAILY 90 tablet 3     acyclovir (ZOVIRAX) 400 MG tablet TAKE 1  TABLET BY MOUTH TWICE DAILY FOR 5 DAYS (Patient not taking: Reported on 7/10/2019) 10 tablet 0     Allergies   Allergen Reactions     Shellfish Allergy      Recent Labs   Lab Test 06/12/18  1541 05/10/17  0940  02/09/16 03/04/14 02/28/12   LDL 68 73  --   --   --   --   --  61.4   HDL 64 87  --  72   < > 59   < > 50   TRIG 66 37  --   --   --  <45   < > 43   TSH 3.77 2.10   < >  --    < >  --    < > 0.19    < > = values in this interval not displayed.        Mammogram not appropriate for this patient based on age.  Mammo discussed, not appropriate for or declined by this patient.    Pertinent mammograms are reviewed under the imaging tab.  History of abnormal Pap smear: NO - age 30-65 PAP every 5 years with negative HPV co-testing recommended  PAP / HPV Latest Ref Rng & Units 5/10/2017 2/11/2014   PAP - NIL NIL   HPV 16 DNA NEG Negative -   HPV 18 DNA NEG Negative -   OTHER HR HPV NEG Negative -     Reviewed and updated as needed this visit by clinical staff  Tobacco  Allergies  Meds  Med Hx  Surg Hx  Fam Hx  Soc Hx        Reviewed and updated as needed this visit by Provider        Review of Systems   Constitutional: Negative for chills and fever.   HENT: Negative for congestion, ear pain, hearing loss and sore throat.    Eyes: Negative for pain and visual disturbance.   Respiratory: Negative for cough and shortness of breath.    Cardiovascular: Negative for chest pain, palpitations and peripheral edema.   Gastrointestinal: Negative for abdominal pain, constipation, diarrhea, heartburn, hematochezia and nausea.   Breasts:  Negative for tenderness, breast mass and discharge.   Genitourinary: Negative for dysuria, frequency, genital sores, hematuria, pelvic pain, urgency, vaginal bleeding and vaginal discharge.   Musculoskeletal: Negative for arthralgias, joint swelling and myalgias.   Skin: Negative for rash.   Neurological: Negative for dizziness, weakness, headaches and paresthesias.  "  Psychiatric/Behavioral: Negative for mood changes. The patient is not nervous/anxious.      This document serves as a record of the services and decisions personally performed and made by Sabrina Hdez MD. It was created on her behalf by Neha Hoang, a trained medical scribe. The creation of this document is based the provider's statements to the medical scribe.    Neha Hoang July 10, 2019 11:31 AM  OBJECTIVE:   /84   Pulse 77   Temp 99.6  F (37.6  C) (Temporal)   Resp 16   Ht 1.753 m (5' 9\")   Wt 105.7 kg (233 lb)   LMP 06/13/2019 (Exact Date)   SpO2 99%   BMI 34.41 kg/m    Physical Exam  GENERAL: healthy, alert and no distress  EYES: Eyes grossly normal to inspection, PERRL and conjunctivae and sclerae normal  HENT: ear canals and TM's normal, nose and mouth without ulcers or lesions  NECK: no adenopathy, no asymmetry, masses, or scars and thyroid normal to palpation  RESP: lungs clear to auscultation - no rales, rhonchi or wheezes  BREAST: normal without masses, tenderness or nipple discharge and no palpable axillary masses or adenopathy  CV: regular rate and rhythm, normal S1 S2, no S3 or S4, no murmur, click or rub, no peripheral edema and peripheral pulses strong  ABDOMEN: soft, nontender, no hepatosplenomegaly, no masses and bowel sounds normal  MS: no gross musculoskeletal defects noted, no edema  SKIN: no suspicious lesions or rashes  NEURO: Normal strength and tone, mentation intact and speech normal  PSYCH: mentation appears normal, affect normal/bright    Diagnostic Test Results:  No results found for this or any previous visit (from the past 24 hour(s)).    ASSESSMENT/PLAN:   1. Routine general medical examination at a health care facility  Routine general medical exam was administered today.   See counseling messages below.    2. Hypothyroidism, unspecified type  Doing well. Well controlled. Tolerating medication.  No change in plan.   Labs have been ordered.   Awaiting " "results.   Dose adjustment as needed.   - TSH with free T4 reflex    3. Lipid screening  Labs have been ordered.   Awaiting results.   - Lipid panel reflex to direct LDL Fasting    4. Screening for diabetes mellitus  Labs have been ordered.   Will notify with results.   - Comprehensive metabolic panel (BMP + Alb, Alk Phos, ALT, AST, Total. Bili, TP)    COUNSELING:  Special attention given to:        Regular exercise       Healthy diet/nutrition    Estimated body mass index is 34.41 kg/m  as calculated from the following:    Height as of this encounter: 1.753 m (5' 9\").    Weight as of this encounter: 105.7 kg (233 lb).    Weight management plan: Discussed healthy diet and exercise guidelines     reports that she has been smoking cigarettes.  She started smoking about 7 years ago. She has never used smokeless tobacco.  Tobacco Cessation Action Plan: Information offered: Patient not interested at this time  Self help information given to patient    Counseling Resources:  ATP IV Guidelines  Pooled Cohorts Equation Calculator  Breast Cancer Risk Calculator  FRAX Risk Assessment  ICSI Preventive Guidelines  Dietary Guidelines for Americans, 2010  USDA's MyPlate  ASA Prophylaxis  Lung CA Screening    The information in this document, created by the medical scribe for me, accurately reflects the services I personally performed and the decisions made by me. I have reviewed and approved this document for accuracy prior to leaving the patient care area.    GREY CHRISTENSEN MD, MD  [facility]  "

## 2019-07-09 ASSESSMENT — ENCOUNTER SYMPTOMS
HEMATURIA: 0
MYALGIAS: 0
FREQUENCY: 0
SHORTNESS OF BREATH: 0
HEARTBURN: 0
BREAST MASS: 0
NERVOUS/ANXIOUS: 0
JOINT SWELLING: 0
COUGH: 0
WEAKNESS: 0
DYSURIA: 0
ARTHRALGIAS: 0
FEVER: 0
SORE THROAT: 0
CHILLS: 0
HEMATOCHEZIA: 0
CONSTIPATION: 0
PARESTHESIAS: 0
NAUSEA: 0
DIZZINESS: 0
EYE PAIN: 0
DIARRHEA: 0
HEADACHES: 0
ABDOMINAL PAIN: 0
PALPITATIONS: 0

## 2019-07-10 ENCOUNTER — TELEPHONE (OUTPATIENT)
Dept: FAMILY MEDICINE | Facility: CLINIC | Age: 34
End: 2019-07-10

## 2019-07-10 ENCOUNTER — OFFICE VISIT (OUTPATIENT)
Dept: FAMILY MEDICINE | Facility: CLINIC | Age: 34
End: 2019-07-10
Payer: COMMERCIAL

## 2019-07-10 VITALS
HEIGHT: 69 IN | BODY MASS INDEX: 34.51 KG/M2 | HEART RATE: 77 BPM | RESPIRATION RATE: 16 BRPM | TEMPERATURE: 99.6 F | SYSTOLIC BLOOD PRESSURE: 122 MMHG | WEIGHT: 233 LBS | OXYGEN SATURATION: 99 % | DIASTOLIC BLOOD PRESSURE: 84 MMHG

## 2019-07-10 DIAGNOSIS — Z13.1 SCREENING FOR DIABETES MELLITUS: ICD-10-CM

## 2019-07-10 DIAGNOSIS — E03.9 HYPOTHYROIDISM, UNSPECIFIED TYPE: ICD-10-CM

## 2019-07-10 DIAGNOSIS — Z00.00 ROUTINE GENERAL MEDICAL EXAMINATION AT A HEALTH CARE FACILITY: Primary | ICD-10-CM

## 2019-07-10 DIAGNOSIS — Z13.220 LIPID SCREENING: ICD-10-CM

## 2019-07-10 LAB
ALBUMIN SERPL-MCNC: 3.9 G/DL (ref 3.4–5)
ALP SERPL-CCNC: 56 U/L (ref 40–150)
ALT SERPL W P-5'-P-CCNC: 26 U/L (ref 0–50)
ANION GAP SERPL CALCULATED.3IONS-SCNC: 8 MMOL/L (ref 3–14)
AST SERPL W P-5'-P-CCNC: 22 U/L (ref 0–45)
BILIRUB SERPL-MCNC: 1.3 MG/DL (ref 0.2–1.3)
BUN SERPL-MCNC: 12 MG/DL (ref 7–30)
CALCIUM SERPL-MCNC: 8.6 MG/DL (ref 8.5–10.1)
CHLORIDE SERPL-SCNC: 105 MMOL/L (ref 94–109)
CHOLEST SERPL-MCNC: 142 MG/DL
CO2 SERPL-SCNC: 25 MMOL/L (ref 20–32)
CREAT SERPL-MCNC: 0.73 MG/DL (ref 0.52–1.04)
GFR SERPL CREATININE-BSD FRML MDRD: >90 ML/MIN/{1.73_M2}
GLUCOSE SERPL-MCNC: 84 MG/DL (ref 70–99)
HDLC SERPL-MCNC: 64 MG/DL
LDLC SERPL CALC-MCNC: 63 MG/DL
NONHDLC SERPL-MCNC: 78 MG/DL
POTASSIUM SERPL-SCNC: 4 MMOL/L (ref 3.4–5.3)
PROT SERPL-MCNC: 7.5 G/DL (ref 6.8–8.8)
SODIUM SERPL-SCNC: 138 MMOL/L (ref 133–144)
TRIGL SERPL-MCNC: 73 MG/DL
TSH SERPL DL<=0.005 MIU/L-ACNC: 2.04 MU/L (ref 0.4–4)

## 2019-07-10 PROCEDURE — 99395 PREV VISIT EST AGE 18-39: CPT | Performed by: FAMILY MEDICINE

## 2019-07-10 PROCEDURE — 84443 ASSAY THYROID STIM HORMONE: CPT | Performed by: FAMILY MEDICINE

## 2019-07-10 PROCEDURE — 80053 COMPREHEN METABOLIC PANEL: CPT | Performed by: FAMILY MEDICINE

## 2019-07-10 PROCEDURE — 36415 COLL VENOUS BLD VENIPUNCTURE: CPT | Performed by: FAMILY MEDICINE

## 2019-07-10 PROCEDURE — 80061 LIPID PANEL: CPT | Performed by: FAMILY MEDICINE

## 2019-07-10 RX ORDER — LEVOTHYROXINE SODIUM 75 UG/1
TABLET ORAL
Qty: 90 TABLET | Refills: 3 | Status: SHIPPED | OUTPATIENT
Start: 2019-07-10 | End: 2020-07-30

## 2019-07-10 ASSESSMENT — ENCOUNTER SYMPTOMS
EYE PAIN: 0
DIZZINESS: 0
ARTHRALGIAS: 0
COUGH: 0
CHILLS: 0
FEVER: 0
PALPITATIONS: 0
WEAKNESS: 0
NERVOUS/ANXIOUS: 0
DYSURIA: 0
HEMATOCHEZIA: 0
HEARTBURN: 0
PARESTHESIAS: 0
NAUSEA: 0
SORE THROAT: 0
SHORTNESS OF BREATH: 0
MYALGIAS: 0
ABDOMINAL PAIN: 0
BREAST MASS: 0
DIARRHEA: 0
HEMATURIA: 0
JOINT SWELLING: 0
HEADACHES: 0
FREQUENCY: 0
CONSTIPATION: 0

## 2019-07-10 ASSESSMENT — MIFFLIN-ST. JEOR: SCORE: 1816.26

## 2020-07-25 DIAGNOSIS — E03.9 HYPOTHYROIDISM, UNSPECIFIED TYPE: ICD-10-CM

## 2020-07-27 NOTE — TELEPHONE ENCOUNTER
Pending Prescriptions:                       Disp   Refills    levothyroxine (SYNTHROID/LEVOTHROID) 75 MC*90 tab*3        Sig: TAKE 1 TABLET(75 MCG) BY MOUTH DAILY    Patient is due for physical. Please call and schedule.    Harmony Fregoso, MSN, RN

## 2020-07-30 RX ORDER — LEVOTHYROXINE SODIUM 75 UG/1
TABLET ORAL
Qty: 90 TABLET | Refills: 0 | Status: SHIPPED | OUTPATIENT
Start: 2020-07-30 | End: 2020-08-06

## 2020-07-31 NOTE — PATIENT INSTRUCTIONS

## 2020-07-31 NOTE — PROGRESS NOTES
SUBJECTIVE:   CC: Jackie Juan is an 34 year old woman who presents for preventive health visit.     Healthy Habits:    Getting at least 3 servings of Calcium per day:  Yes    Bi-annual eye exam:  Yes    Dental care twice a year:  Yes    Sleep apnea or symptoms of sleep apnea:  None    Diet:  Regular (no restrictions)    Frequency of exercise:: 2-5 days     Duration of exercise:  15-30 minutes    Taking medications regularly:  Yes    Barriers to taking medications:  None    Medication side effects:  None    PHQ-2 Total Score:    Additional concerns today:  No    Routine Health Maintenance:  Fasting: YES  Immunizations: up to date   Fam hx of breast cancer: Pat gma breast ca in 70s.  Fam hx of colon cancer: no     Pap: Last: 05/10/2017. Hx of abnormal: no   Sexually active: yes. STD screening: no concerns   Periods/menopause/contraception: Patient's last menstrual period was 07/23/2020. fairly regular, monthly. Bleeding 4d.   PCB, pelvic pain, dyspareunia, vaginal discharge: no    Intentional weight loss- trying intermittent fasting.   Started more meal planning and prep  For exercise: 30 min on elliptical or walking for 60min a few days per week; feels good with exercise. Limitations with exercise due to: nothing. Denies CV sxs with exercise including CP, SOB, palpitations, MOTT, presyncope.    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 Mother did have clot and found to have it, then pt tested positive.     Cold sores- uses acyclovir prn. 1-2 outbreaks per year. Would like refills.     Hypothyroidism Follow-up- has been on levothyroxine for a long time. Dose stable last 6-7yrs.     Since last visit, patient describes the following symptoms: Weight stable, no hair loss, no skin changes, no constipation, no loose stools      Today's PHQ-2 Score:   PHQ-2 ( 1999 Pfizer) 8/5/2020   Q1: Little interest or pleasure in doing things 0   Q2: Feeling down, depressed or hopeless 0   PHQ-2 Score 0   Q1: Little interest or pleasure in  doing things Not at all   Q2: Feeling down, depressed or hopeless Not at all   PHQ-2 Score 0       Abuse: Current or Past(Physical, Sexual or Emotional)- No  Do you feel safe in your environment? Yes        Social History     Tobacco Use     Smoking status: Light Tobacco Smoker     Types: Cigarettes     Start date: 2011     Last attempt to quit: 2013     Years since quittin.6     Smokeless tobacco: Never Used     Tobacco comment:  smoke 1 or 2 cigarettes a month   Substance Use Topics     Alcohol use: Yes     Comment: occiasional     If you drink alcohol do you typically have >3 drinks per day or >7 drinks per week? No    Alcohol Use 2020   Prescreen: >3 drinks/day or >7 drinks/week? -   Prescreen: >3 drinks/day or >7 drinks/week? No       Reviewed orders with patient.  Reviewed health maintenance and updated orders accordingly - Yes  Lab work is in process  Labs reviewed in EPIC  BP Readings from Last 3 Encounters:   20 122/82   07/10/19 122/84   18 122/78    Wt Readings from Last 3 Encounters:   20 99.8 kg (220 lb)   07/10/19 105.7 kg (233 lb)   18 102.6 kg (226 lb 1.6 oz)                  Patient Active Problem List   Diagnosis     Hypothyroidism     Factor 5 Leiden mutation, heterozygous (H)     Hyperlipidemia LDL goal <160     Past Surgical History:   Procedure Laterality Date     NO HISTORY OF SURGERY         Social History     Tobacco Use     Smoking status: Light Tobacco Smoker     Types: Cigarettes     Start date: 2011     Last attempt to quit: 2013     Years since quittin.6     Smokeless tobacco: Never Used     Tobacco comment:  smoke 1 or 2 cigarettes a month   Substance Use Topics     Alcohol use: Yes     Comment: occiasional     Family History   Problem Relation Age of Onset     Heart Failure Father         CHF     Hypertension Father      Thyroid Disease Father      Thyroid Disease Father      Diabetes Father      Hypertension Brother       Hypertension Mother      Thyroid Disease Mother      Depression Mother      Thyroid Disease Mother      Hypertension Brother      Breast Cancer Paternal Grandmother          Current Outpatient Medications   Medication Sig Dispense Refill     levothyroxine (SYNTHROID/LEVOTHROID) 75 MCG tablet TAKE 1 TABLET(75 MCG) BY MOUTH DAILY 90 tablet 0     acyclovir (ZOVIRAX) 400 MG tablet TAKE 1 TABLET BY MOUTH TWICE DAILY FOR 5 DAYS (Patient not taking: Reported on 7/10/2019) 10 tablet 0     Allergies   Allergen Reactions     Shellfish Allergy        Mammogram not appropriate for this patient based on age.    Pertinent mammograms are reviewed under the imaging tab.  History of abnormal Pap smear: NO - age 30-65 PAP every 5 years with negative HPV co-testing recommended  PAP / HPV Latest Ref Rng & Units 5/10/2017 2/11/2014   PAP - NIL NIL   HPV 16 DNA NEG Negative -   HPV 18 DNA NEG Negative -   OTHER HR HPV NEG Negative -     Reviewed and updated as needed this visit by clinical staff  Allergies  Meds         Reviewed and updated as needed this visit by Provider            Review of Systems   Constitutional: Negative for chills and fever.   HENT: Negative for congestion, ear pain, hearing loss and sore throat.    Eyes: Negative for pain and visual disturbance.   Respiratory: Negative for cough and shortness of breath.    Cardiovascular: Negative for chest pain, palpitations and peripheral edema.   Gastrointestinal: Negative for abdominal pain, constipation, diarrhea, heartburn, hematochezia and nausea.   Breasts:  Negative for tenderness, breast mass and discharge.   Genitourinary: Negative for dysuria, frequency, genital sores, hematuria, pelvic pain, urgency, vaginal bleeding and vaginal discharge.   Musculoskeletal: Negative for arthralgias, joint swelling and myalgias.   Skin: Negative for rash.   Neurological: Negative for dizziness, weakness, headaches and paresthesias.   Psychiatric/Behavioral: Negative for mood  changes. The patient is not nervous/anxious.         OBJECTIVE:   LMP 07/23/2020   Physical Exam  GENERAL: healthy, alert and no distress  EYES: Eyes grossly normal to inspection, PERRL and conjunctivae and sclerae normal  HENT: ear canals and TM's normal, nose and mouth without ulcers or lesions  NECK: no adenopathy, no asymmetry, masses, or scars and thyroid normal to palpation  RESP: lungs clear to auscultation - no rales, rhonchi or wheezes  BREAST: normal without masses, tenderness or nipple discharge and no palpable axillary masses or adenopathy  CV: regular rate and rhythm, normal S1 S2, no S3 or S4, no murmur, click or rub, no peripheral edema and peripheral pulses strong  ABDOMEN: soft, nontender, no hepatosplenomegaly, no masses and bowel sounds normal   (female): deferred  MS: no gross musculoskeletal defects noted, no edema  SKIN: no suspicious lesions or rashes  NEURO: Normal strength and tone, mentation intact and speech normal  PSYCH: mentation appears normal, affect normal/bright  LYMPH: normal ant/post cervical, supraclavicular nodes    Diagnostic Test Results:  Labs reviewed in Epic  Results for orders placed or performed in visit on 08/06/20 (from the past 24 hour(s))   TSH WITH FREE T4 REFLEX   Result Value Ref Range    TSH 2.80 0.40 - 4.00 mU/L   Glucose   Result Value Ref Range    Glucose 89 70 - 99 mg/dL   CBC with platelets   Result Value Ref Range    WBC 6.1 4.0 - 11.0 10e9/L    RBC Count 5.05 3.8 - 5.2 10e12/L    Hemoglobin 15.3 11.7 - 15.7 g/dL    Hematocrit 46.8 35.0 - 47.0 %    MCV 93 78 - 100 fl    MCH 30.3 26.5 - 33.0 pg    MCHC 32.7 31.5 - 36.5 g/dL    RDW 12.3 10.0 - 15.0 %    Platelet Count 267 150 - 450 10e9/L       ASSESSMENT/PLAN:   1. Routine general medical examination at a health care facility  Fasting labs  Immunizations UTD  Pap UTD  - Lipid panel reflex to direct LDL Fasting  - Glucose  - CBC with platelets    2. Hypothyroidism, unspecified type  Stable on current dose.  "TSH at goal.  Refilled x1 yr.   - TSH WITH FREE T4 REFLEX  - levothyroxine (SYNTHROID/LEVOTHROID) 75 MCG tablet; Take 1 tablet (75 mcg) by mouth daily  Dispense: 90 tablet; Refill: 3    3. Recurrent cold sores  Refilled for prn treatment of outbreaks  - acyclovir (ZOVIRAX) 400 MG tablet; Take 1 tablet (400 mg) by mouth 2 times daily  Dispense: 30 tablet; Refill: 1    4. Factor 5 Leiden mutation, heterozygous (H)  No hx of clot. Had testing due to fam hx of clot.     5. Screening for hyperlipidemia  Lipid panel last year in 2019 all within normal limits.   rescreen today  Continue healthy habits  - Lipid panel reflex to direct LDL Fasting    6. Light tobacco smoker  Social smoking. Encouraged cessation. Pt declined assistance/resources.       COUNSELING:  Reviewed preventive health counseling, as reflected in patient instructions       Regular exercise       Healthy diet/nutrition    Estimated body mass index is 34.41 kg/m  as calculated from the following:    Height as of 7/10/19: 5' 9\" (1.753 m).    Weight as of 7/10/19: 233 lb (105.7 kg).    Weight management plan: Discussed healthy diet and exercise guidelines     reports that she has been smoking cigarettes. She started smoking about 8 years ago. She has never used smokeless tobacco.  Tobacco Cessation Action Plan: Information offered: Patient not interested at this time    Counseling Resources:  ATP IV Guidelines  Pooled Cohorts Equation Calculator  Breast Cancer Risk Calculator  FRAX Risk Assessment  ICSI Preventive Guidelines  Dietary Guidelines for Americans, 2010  USDA's MyPlate  ASA Prophylaxis  Lung CA Screening    Kimberly Mckeon PA-C  [facility]  "

## 2020-08-05 ASSESSMENT — ENCOUNTER SYMPTOMS
HEMATURIA: 0
COUGH: 0
DIARRHEA: 0
SHORTNESS OF BREATH: 0
PALPITATIONS: 0
NAUSEA: 0
DYSURIA: 0
DIZZINESS: 0
JOINT SWELLING: 0
PARESTHESIAS: 0
ARTHRALGIAS: 0
CHILLS: 0
HEADACHES: 0
HEMATOCHEZIA: 0
WEAKNESS: 0
HEARTBURN: 0
BREAST MASS: 0
CONSTIPATION: 0
SORE THROAT: 0
EYE PAIN: 0
NERVOUS/ANXIOUS: 0
ABDOMINAL PAIN: 0
MYALGIAS: 0
FREQUENCY: 0
FEVER: 0

## 2020-08-06 ENCOUNTER — OFFICE VISIT (OUTPATIENT)
Dept: FAMILY MEDICINE | Facility: OTHER | Age: 35
End: 2020-08-06
Payer: COMMERCIAL

## 2020-08-06 VITALS
WEIGHT: 220 LBS | DIASTOLIC BLOOD PRESSURE: 82 MMHG | BODY MASS INDEX: 31.5 KG/M2 | RESPIRATION RATE: 18 BRPM | TEMPERATURE: 97.8 F | HEIGHT: 70 IN | SYSTOLIC BLOOD PRESSURE: 122 MMHG | OXYGEN SATURATION: 100 % | HEART RATE: 70 BPM

## 2020-08-06 DIAGNOSIS — Z13.220 SCREENING FOR HYPERLIPIDEMIA: ICD-10-CM

## 2020-08-06 DIAGNOSIS — D68.51 FACTOR 5 LEIDEN MUTATION, HETEROZYGOUS (H): Chronic | ICD-10-CM

## 2020-08-06 DIAGNOSIS — F17.200 LIGHT TOBACCO SMOKER: ICD-10-CM

## 2020-08-06 DIAGNOSIS — E03.9 HYPOTHYROIDISM, UNSPECIFIED TYPE: ICD-10-CM

## 2020-08-06 DIAGNOSIS — Z00.00 ROUTINE GENERAL MEDICAL EXAMINATION AT A HEALTH CARE FACILITY: Primary | ICD-10-CM

## 2020-08-06 DIAGNOSIS — B00.1 RECURRENT COLD SORES: ICD-10-CM

## 2020-08-06 LAB
ERYTHROCYTE [DISTWIDTH] IN BLOOD BY AUTOMATED COUNT: 12.3 % (ref 10–15)
GLUCOSE SERPL-MCNC: 89 MG/DL (ref 70–99)
HCT VFR BLD AUTO: 46.8 % (ref 35–47)
HGB BLD-MCNC: 15.3 G/DL (ref 11.7–15.7)
MCH RBC QN AUTO: 30.3 PG (ref 26.5–33)
MCHC RBC AUTO-ENTMCNC: 32.7 G/DL (ref 31.5–36.5)
MCV RBC AUTO: 93 FL (ref 78–100)
PLATELET # BLD AUTO: 267 10E9/L (ref 150–450)
RBC # BLD AUTO: 5.05 10E12/L (ref 3.8–5.2)
TSH SERPL DL<=0.005 MIU/L-ACNC: 2.8 MU/L (ref 0.4–4)
WBC # BLD AUTO: 6.1 10E9/L (ref 4–11)

## 2020-08-06 PROCEDURE — 99214 OFFICE O/P EST MOD 30 MIN: CPT | Mod: 25 | Performed by: PHYSICIAN ASSISTANT

## 2020-08-06 PROCEDURE — 85027 COMPLETE CBC AUTOMATED: CPT | Performed by: PHYSICIAN ASSISTANT

## 2020-08-06 PROCEDURE — 80061 LIPID PANEL: CPT | Performed by: PHYSICIAN ASSISTANT

## 2020-08-06 PROCEDURE — 36415 COLL VENOUS BLD VENIPUNCTURE: CPT | Performed by: PHYSICIAN ASSISTANT

## 2020-08-06 PROCEDURE — 82947 ASSAY GLUCOSE BLOOD QUANT: CPT | Performed by: PHYSICIAN ASSISTANT

## 2020-08-06 PROCEDURE — 99395 PREV VISIT EST AGE 18-39: CPT | Performed by: PHYSICIAN ASSISTANT

## 2020-08-06 PROCEDURE — 84443 ASSAY THYROID STIM HORMONE: CPT | Performed by: PHYSICIAN ASSISTANT

## 2020-08-06 RX ORDER — ACYCLOVIR 400 MG/1
400 TABLET ORAL 2 TIMES DAILY
Qty: 30 TABLET | Refills: 1 | Status: SHIPPED | OUTPATIENT
Start: 2020-08-06 | End: 2021-11-26

## 2020-08-06 RX ORDER — LEVOTHYROXINE SODIUM 75 UG/1
75 TABLET ORAL DAILY
Qty: 90 TABLET | Refills: 3 | Status: SHIPPED | OUTPATIENT
Start: 2020-08-06 | End: 2021-08-18

## 2020-08-06 ASSESSMENT — ENCOUNTER SYMPTOMS
DIARRHEA: 0
COUGH: 0
PALPITATIONS: 0
CONSTIPATION: 0
SHORTNESS OF BREATH: 0
EYE PAIN: 0
ARTHRALGIAS: 0
BREAST MASS: 0
WEAKNESS: 0
HEARTBURN: 0
HEMATOCHEZIA: 0
DYSURIA: 0
ABDOMINAL PAIN: 0
SORE THROAT: 0
JOINT SWELLING: 0
HEMATURIA: 0
FREQUENCY: 0
PARESTHESIAS: 0
FEVER: 0
MYALGIAS: 0
DIZZINESS: 0
NAUSEA: 0
NERVOUS/ANXIOUS: 0
HEADACHES: 0
CHILLS: 0

## 2020-08-06 ASSESSMENT — MIFFLIN-ST. JEOR: SCORE: 1768.16

## 2020-08-07 LAB
CHOLEST SERPL-MCNC: 161 MG/DL
HDLC SERPL-MCNC: 68 MG/DL
LDLC SERPL CALC-MCNC: 79 MG/DL
NONHDLC SERPL-MCNC: 93 MG/DL
TRIGL SERPL-MCNC: 69 MG/DL

## 2020-12-20 ENCOUNTER — HEALTH MAINTENANCE LETTER (OUTPATIENT)
Age: 35
End: 2020-12-20

## 2021-06-07 ENCOUNTER — MYC MEDICAL ADVICE (OUTPATIENT)
Dept: FAMILY MEDICINE | Facility: CLINIC | Age: 36
End: 2021-06-07

## 2021-06-07 DIAGNOSIS — M79.671 RIGHT FOOT PAIN: Primary | ICD-10-CM

## 2021-06-08 NOTE — TELEPHONE ENCOUNTER
Provider please advise if patient should be seen in clinic or by Podiatry.    Elena Forrset RN on 6/8/2021 at 1:24 PM

## 2021-06-22 ENCOUNTER — MYC MEDICAL ADVICE (OUTPATIENT)
Dept: PODIATRY | Facility: CLINIC | Age: 36
End: 2021-06-22

## 2021-06-23 ENCOUNTER — OFFICE VISIT (OUTPATIENT)
Dept: PODIATRY | Facility: OTHER | Age: 36
End: 2021-06-23
Attending: FAMILY MEDICINE
Payer: COMMERCIAL

## 2021-06-23 ENCOUNTER — ANCILLARY PROCEDURE (OUTPATIENT)
Dept: GENERAL RADIOLOGY | Facility: OTHER | Age: 36
End: 2021-06-23
Attending: PODIATRIST
Payer: COMMERCIAL

## 2021-06-23 VITALS
SYSTOLIC BLOOD PRESSURE: 120 MMHG | BODY MASS INDEX: 34.51 KG/M2 | DIASTOLIC BLOOD PRESSURE: 80 MMHG | HEIGHT: 69 IN | WEIGHT: 233 LBS

## 2021-06-23 DIAGNOSIS — G57.61 MORTON'S NEUROMA OF RIGHT FOOT: Primary | ICD-10-CM

## 2021-06-23 DIAGNOSIS — M79.671 RIGHT FOOT PAIN: ICD-10-CM

## 2021-06-23 PROCEDURE — 99203 OFFICE O/P NEW LOW 30 MIN: CPT | Performed by: PODIATRIST

## 2021-06-23 PROCEDURE — 73630 X-RAY EXAM OF FOOT: CPT | Mod: RT | Performed by: RADIOLOGY

## 2021-06-23 ASSESSMENT — MIFFLIN-ST. JEOR: SCORE: 1810.87

## 2021-06-23 ASSESSMENT — PAIN SCALES - GENERAL: PAINLEVEL: NO PAIN (0)

## 2021-06-23 NOTE — PATIENT INSTRUCTIONS
[employer] stores: To maximize your experience and provide the best possible fit.  Be sure to show them your foot concerns and tell them Dr. Monotya sent you.      Stores listed in bold have only athletic shoes, and stores that are not bold are mostly casual or variety of shoes    [employer]  [address] [place] [address]  [phone]     [employer]  [address] [place] [address]  [phone]     [employer]  [address] [place] [address]  [phone]    [employer]  [address] [facility]  [place] [address]  [phone]    [employer]  [address] [place] [address]  [phone]    [employer]  [address] [place] [address]  [phone]                         [employer] Locations:     [address] [place] [address]   [phone]     [address] [place] [address] [address] [place] [address]   [phone]     [address] [place] [address]   [phone]     [address] [facility] [place] [address]   [phone]     [address] [place] [place] [address]   [phone]     [address] [place] [address]   [phone]     [address] [facility] [address]   [place] [address]   [phone]     [address] [facility]   [place] [address]   [phone]             How to find reasonable shoes          The correct width    Correct Fitting    Correct Length      Foot Distortion    Posture Distortion                          Torsional Rigidity      Grasp behind the heel and underneath the foot and twist      Bad    Excessive torsion/twist in midfoot     Less torsion/twist in midfoot is better                   Heel Counter Rigidity      Grasp just above   midsole and squeeze      Bad    Soft heel counter      Good    Rigid Heel Counter      Flexion Rigidity      Grasp shoe and bend from forefoot to rearfoot

## 2021-06-23 NOTE — LETTER
"    6/23/2021         RE: Jackie Juan  [id number] [facility] [address]        Dear Colleague,    Thank you for referring your patient, Jackie Juan, to the [facility]. Please see a copy of my visit note below.    HPI:  Jackie Juan is a 35 year old female who is seen in consultation at the request of Sabrina Hdez MD    Pt presents for eval of:   (Onset, Location, L/R, Character, Treatments, Injury if yes)    XR Right foot 6/23/2021     Onset Nov 2020, plantar base Right 4th toe. No injury noted. Presents today with [employer] athletic shoes.  Intermittent, dull ache. With flexion feels like a \"snap of rubber band\", numbness, feels like walking on a pebble, 3-6/10. Worse barefoot.    Works at [employer] .    Weight management plan: Patient was referred to their PCP to discuss a diet and exercise plan.     ROS:  10 point ROS neg other than the symptoms noted above in the HPI.    Patient Active Problem List   Diagnosis     Hypothyroidism     Factor 5 Leiden mutation, heterozygous (H)     Recurrent cold sores       PAST MEDICAL HISTORY:   Past Medical History:   Diagnosis Date     Factor 5 Leiden mutation, heterozygous 2/11/2014     Factor 5 Leiden mutation, heterozygous (H)      Thyroid disease         PAST SURGICAL HISTORY:   Past Surgical History:   Procedure Laterality Date     NO HISTORY OF SURGERY          MEDICATIONS:   Current Outpatient Medications:      levothyroxine (SYNTHROID/LEVOTHROID) 75 MCG tablet, Take 1 tablet (75 mcg) by mouth daily, Disp: 90 tablet, Rfl: 3     acyclovir (ZOVIRAX) 400 MG tablet, Take 1 tablet (400 mg) by mouth 2 times daily, Disp: 30 tablet, Rfl: 1     ALLERGIES:    Allergies   Allergen Reactions     Shellfish Allergy         SOCIAL HISTORY:   Social History     Socioeconomic History     Marital status:      Spouse name: Not on file     Number of children: Not on file     Years of education: Not on file     Highest " education level: Not on file   Occupational History     Not on file   Social Needs     Financial resource strain: Not on file     Food insecurity     Worry: Not on file     Inability: Not on file     Transportation needs     Medical: Not on file     Non-medical: Not on file   Tobacco Use     Smoking status: Light Tobacco Smoker     Types: Cigarettes     Start date: 2011     Last attempt to quit: 2013     Years since quittin.4     Smokeless tobacco: Never Used     Tobacco comment:  smoke 1 or 2 cigarettes a month; can go a few months without   Substance and Sexual Activity     Alcohol use: Yes     Comment: occasional- 2-3 on weekends      Drug use: Yes     Comment: occasionally  marijuana use     Sexual activity: Yes     Partners: Male     Birth control/protection: Condom     Comment:  has Vas.    Lifestyle     Physical activity     Days per week: Not on file     Minutes per session: Not on file     Stress: Not on file   Relationships     Social connections     Talks on phone: Not on file     Gets together: Not on file     Attends Rastafarian service: Not on file     Active member of club or organization: Not on file     Attends meetings of clubs or organizations: Not on file     Relationship status: Not on file     Intimate partner violence     Fear of current or ex partner: Not on file     Emotionally abused: Not on file     Physically abused: Not on file     Forced sexual activity: Not on file   Other Topics Concern     Parent/sibling w/ CABG, MI or angioplasty before 65F 55M? No   Social History Narrative     Not on file        FAMILY HISTORY:   Family History   Problem Relation Age of Onset     Heart Failure Father         CHF     Hypertension Father      Thyroid Disease Father      Thyroid Disease Father      Diabetes Father      Hypertension Brother      Hypertension Mother      Thyroid Disease Mother      Depression Mother      Thyroid Disease Mother      Hypertension Brother      Breast  "Cancer Paternal Grandmother         EXAM:Vitals: /80 (BP Location: Left arm, Patient Position: Sitting, Cuff Size: Adult Large)   Ht 1.76 m (5' 9.29\")   Wt 105.7 kg (233 lb)   BMI 34.12 kg/m    BMI= Body mass index is 34.12 kg/m .    General appearance: Patient is alert and fully cooperative with history & exam.  No sign of distress is noted during the visit.     Psychiatric: Affect is pleasant & appropriate.  Patient appears motivated to improve health.     Respiratory: Breathing is regular & unlabored while sitting.     HEENT: Hearing is intact to spoken word.  Speech is clear.  No gross evidence of visual impairment that would impact ambulation.     Vascular: DP & PT pulses are intact & regular bilaterally.  No significant edema or varicosities noted.  CFT and skin temperature is normal to both lower extremities.     Neurologic: Lower extremity sensation is intact to light touch.  Palpable Herson's click is noted about the right third intermetatarsal space.  With this click there is reproduction of symptoms.    Dermatologic: Skin is intact to both lower extremities with adequate texture, turgor and tone about the integument.  No paronychia or evidence of soft tissue infection is noted.     Musculoskeletal: Patient is ambulatory without assistive device or brace.  Mild forefoot valgus is noted.  No pain with direct palpation to the plantar metatarsal phalangeal joints bilateral.  Negative drawer maneuver of the MPJs.    Radiographs: 3 views of the right foot demonstrate no acute cortical reaction or dislocation of the joint.  No medial or lateral deviation of the joints.     ASSESSMENT:       ICD-10-CM    1. Shirley's neuroma of right foot  G57.61 Orthotics, Prosthetics and Custom Compression Order for DME - ONLY FOR DME        PLAN:  Reviewed patient's chart in [facility].      6/23/2021   Obtained and interpreted radiographs.  This is most consistent with a Shirley's neuroma.  Discussed appropriate shoe gear, " sturdy through the shank with more cushion through the forefoot.  Order was placed for custom molded orthotics  We discussed injection as well however she would rather progress to shoes and changes in orthotics first.  Follow-up in 4 5 weeks or after utilizing the orthotics for some amount of time and changes in shoe gear  If this remains symptomatic we may consider injection or surgical resection at that time.      Catalino Montoya DPM        Again, thank you for allowing me to participate in the care of your patient.        Sincerely,        Catalino Montoya DPM

## 2021-06-23 NOTE — PROGRESS NOTES
"HPI:  Jackie Juan is a 35 year old female who is seen in consultation at the request of Sabrina Hdez MD    Pt presents for eval of:   (Onset, Location, L/R, Character, Treatments, Injury if yes)    XR Right foot 6/23/2021     Onset Nov 2020, plantar base Right 4th toe. No injury noted. Presents today with  athletic shoes.  Intermittent, dull ache. With flexion feels like a \"snap of rubber band\", numbness, feels like walking on a pebble, 3-6/10. Worse barefoot.    Works at [employer].    Weight management plan: Patient was referred to their PCP to discuss a diet and exercise plan.     ROS:  10 point ROS neg other than the symptoms noted above in the HPI.    Patient Active Problem List   Diagnosis     Hypothyroidism     Factor 5 Leiden mutation, heterozygous (H)     Recurrent cold sores       PAST MEDICAL HISTORY:   Past Medical History:   Diagnosis Date     Factor 5 Leiden mutation, heterozygous 2/11/2014     Factor 5 Leiden mutation, heterozygous (H)      Thyroid disease         PAST SURGICAL HISTORY:   Past Surgical History:   Procedure Laterality Date     NO HISTORY OF SURGERY          MEDICATIONS:   Current Outpatient Medications:      levothyroxine (SYNTHROID/LEVOTHROID) 75 MCG tablet, Take 1 tablet (75 mcg) by mouth daily, Disp: 90 tablet, Rfl: 3     acyclovir (ZOVIRAX) 400 MG tablet, Take 1 tablet (400 mg) by mouth 2 times daily, Disp: 30 tablet, Rfl: 1     ALLERGIES:    Allergies   Allergen Reactions     Shellfish Allergy         SOCIAL HISTORY:   Social History     Socioeconomic History     Marital status:      Spouse name: Not on file     Number of children: Not on file     Years of education: Not on file     Highest education level: Not on file   Occupational History     Not on file   Social Needs     Financial resource strain: Not on file     Food insecurity     Worry: Not on file     Inability: Not on file     Transportation needs     Medical: Not " "on file     Non-medical: Not on file   Tobacco Use     Smoking status: Light Tobacco Smoker     Types: Cigarettes     Start date: 2011     Last attempt to quit: 2013     Years since quittin.4     Smokeless tobacco: Never Used     Tobacco comment:  smoke 1 or 2 cigarettes a month; can go a few months without   Substance and Sexual Activity     Alcohol use: Yes     Comment: occasional- 2-3 on weekends      Drug use: Yes     Comment: occasionally  marijuana use     Sexual activity: Yes     Partners: Male     Birth control/protection: Condom     Comment:  has Vas.    Lifestyle     Physical activity     Days per week: Not on file     Minutes per session: Not on file     Stress: Not on file   Relationships     Social connections     Talks on phone: Not on file     Gets together: Not on file     Attends Confucianist service: Not on file     Active member of club or organization: Not on file     Attends meetings of clubs or organizations: Not on file     Relationship status: Not on file     Intimate partner violence     Fear of current or ex partner: Not on file     Emotionally abused: Not on file     Physically abused: Not on file     Forced sexual activity: Not on file   Other Topics Concern     Parent/sibling w/ CABG, MI or angioplasty before 65F 55M? No   Social History Narrative     Not on file        FAMILY HISTORY:   Family History   Problem Relation Age of Onset     Heart Failure Father         CHF     Hypertension Father      Thyroid Disease Father      Thyroid Disease Father      Diabetes Father      Hypertension Brother      Hypertension Mother      Thyroid Disease Mother      Depression Mother      Thyroid Disease Mother      Hypertension Brother      Breast Cancer Paternal Grandmother         EXAM:Vitals: /80 (BP Location: Left arm, Patient Position: Sitting, Cuff Size: Adult Large)   Ht 1.76 m (5' 9.29\")   Wt 105.7 kg (233 lb)   BMI 34.12 kg/m    BMI= Body mass index is 34.12 " kg/m .    General appearance: Patient is alert and fully cooperative with history & exam.  No sign of distress is noted during the visit.     Psychiatric: Affect is pleasant & appropriate.  Patient appears motivated to improve health.     Respiratory: Breathing is regular & unlabored while sitting.     HEENT: Hearing is intact to spoken word.  Speech is clear.  No gross evidence of visual impairment that would impact ambulation.     Vascular: DP & PT pulses are intact & regular bilaterally.  No significant edema or varicosities noted.  CFT and skin temperature is normal to both lower extremities.     Neurologic: Lower extremity sensation is intact to light touch.  Palpable Herson's click is noted about the right third intermetatarsal space.  With this click there is reproduction of symptoms.    Dermatologic: Skin is intact to both lower extremities with adequate texture, turgor and tone about the integument.  No paronychia or evidence of soft tissue infection is noted.     Musculoskeletal: Patient is ambulatory without assistive device or brace.  Mild forefoot valgus is noted.  No pain with direct palpation to the plantar metatarsal phalangeal joints bilateral.  Negative drawer maneuver of the MPJs.    Radiographs: 3 views of the right foot demonstrate no acute cortical reaction or dislocation of the joint.  No medial or lateral deviation of the joints.     ASSESSMENT:       ICD-10-CM    1. Shirley's neuroma of right foot  G57.61 Orthotics, Prosthetics and Custom Compression Order for DME - ONLY FOR DME        PLAN:  Reviewed patient's chart in [facility].      6/23/2021   Obtained and interpreted radiographs.  This is most consistent with a Shirley's neuroma.  Discussed appropriate shoe gear, sturdy through the shank with more cushion through the forefoot.  Order was placed for custom molded orthotics  We discussed injection as well however she would rather progress to shoes and changes in orthotics first.  Follow-up in 4  5 weeks or after utilizing the orthotics for some amount of time and changes in shoe gear  If this remains symptomatic we may consider injection or surgical resection at that time.      Catalino Montoya DPM

## 2021-08-18 DIAGNOSIS — E03.9 HYPOTHYROIDISM, UNSPECIFIED TYPE: ICD-10-CM

## 2021-08-18 RX ORDER — LEVOTHYROXINE SODIUM 75 UG/1
TABLET ORAL
Qty: 90 TABLET | Refills: 0 | Status: SHIPPED | OUTPATIENT
Start: 2021-08-18 | End: 2021-11-19

## 2021-08-18 NOTE — TELEPHONE ENCOUNTER
Addended by: TUTU WHITTAKER on: 10/11/2018 11:54 AM     Modules accepted: Orders     Pending Prescriptions:                       Disp   Refills    levothyroxine (SYNTHROID/LEVOTHROID) 75 M*90 tab*0            Sig: TAKE 1 TABLET(75 MCG) BY MOUTH DAILY    Medication is being filled for 1 time caroline refill only due to:  Patient is due for annual, thyroid labs and med check    Please call and help schedule.  Thank you!

## 2021-08-18 NOTE — TELEPHONE ENCOUNTER
My Chart message sent to patient.    - appointment needed prior to further refills    Dahiana Galarza XRO/

## 2021-08-31 ENCOUNTER — OFFICE VISIT (OUTPATIENT)
Dept: FAMILY MEDICINE | Facility: CLINIC | Age: 36
End: 2021-08-31
Payer: COMMERCIAL

## 2021-08-31 ENCOUNTER — ANCILLARY PROCEDURE (OUTPATIENT)
Dept: ULTRASOUND IMAGING | Facility: CLINIC | Age: 36
End: 2021-08-31
Attending: NURSE PRACTITIONER
Payer: COMMERCIAL

## 2021-08-31 VITALS
HEART RATE: 65 BPM | RESPIRATION RATE: 16 BRPM | TEMPERATURE: 98.1 F | HEIGHT: 69 IN | BODY MASS INDEX: 31.12 KG/M2 | WEIGHT: 210.1 LBS | OXYGEN SATURATION: 100 % | SYSTOLIC BLOOD PRESSURE: 120 MMHG | DIASTOLIC BLOOD PRESSURE: 76 MMHG

## 2021-08-31 DIAGNOSIS — Z71.89 ADVANCED DIRECTIVES, COUNSELING/DISCUSSION: ICD-10-CM

## 2021-08-31 DIAGNOSIS — Z23 NEED FOR VACCINATION: ICD-10-CM

## 2021-08-31 DIAGNOSIS — M79.661 RIGHT CALF PAIN: ICD-10-CM

## 2021-08-31 DIAGNOSIS — I82.451 ACUTE DEEP VEIN THROMBOSIS (DVT) OF RIGHT PERONEAL VEIN (H): Primary | ICD-10-CM

## 2021-08-31 LAB
BASOPHILS # BLD AUTO: 0 10E3/UL (ref 0–0.2)
BASOPHILS NFR BLD AUTO: 0 %
EOSINOPHIL # BLD AUTO: 0.2 10E3/UL (ref 0–0.7)
EOSINOPHIL NFR BLD AUTO: 2 %
ERYTHROCYTE [DISTWIDTH] IN BLOOD BY AUTOMATED COUNT: 12.5 % (ref 10–15)
HCT VFR BLD AUTO: 42.6 % (ref 35–47)
HGB BLD-MCNC: 14.6 G/DL (ref 11.7–15.7)
LYMPHOCYTES # BLD AUTO: 2 10E3/UL (ref 0.8–5.3)
LYMPHOCYTES NFR BLD AUTO: 26 %
MCH RBC QN AUTO: 30.7 PG (ref 26.5–33)
MCHC RBC AUTO-ENTMCNC: 34.3 G/DL (ref 31.5–36.5)
MCV RBC AUTO: 90 FL (ref 78–100)
MONOCYTES # BLD AUTO: 0.6 10E3/UL (ref 0–1.3)
MONOCYTES NFR BLD AUTO: 8 %
NEUTROPHILS # BLD AUTO: 4.7 10E3/UL (ref 1.6–8.3)
NEUTROPHILS NFR BLD AUTO: 64 %
PLATELET # BLD AUTO: 250 10E3/UL (ref 150–450)
RADIOLOGIST FLAGS: ABNORMAL
RBC # BLD AUTO: 4.75 10E6/UL (ref 3.8–5.2)
WBC # BLD AUTO: 7.5 10E3/UL (ref 4–11)

## 2021-08-31 PROCEDURE — 36415 COLL VENOUS BLD VENIPUNCTURE: CPT | Performed by: NURSE PRACTITIONER

## 2021-08-31 PROCEDURE — 99214 OFFICE O/P EST MOD 30 MIN: CPT | Mod: 25 | Performed by: NURSE PRACTITIONER

## 2021-08-31 PROCEDURE — 85379 FIBRIN DEGRADATION QUANT: CPT | Performed by: NURSE PRACTITIONER

## 2021-08-31 PROCEDURE — 85610 PROTHROMBIN TIME: CPT | Performed by: NURSE PRACTITIONER

## 2021-08-31 PROCEDURE — 90471 IMMUNIZATION ADMIN: CPT | Performed by: NURSE PRACTITIONER

## 2021-08-31 PROCEDURE — 90732 PPSV23 VACC 2 YRS+ SUBQ/IM: CPT | Performed by: NURSE PRACTITIONER

## 2021-08-31 PROCEDURE — 85730 THROMBOPLASTIN TIME PARTIAL: CPT | Performed by: NURSE PRACTITIONER

## 2021-08-31 PROCEDURE — 85025 COMPLETE CBC W/AUTO DIFF WBC: CPT | Performed by: NURSE PRACTITIONER

## 2021-08-31 PROCEDURE — 93971 EXTREMITY STUDY: CPT | Mod: RT

## 2021-08-31 ASSESSMENT — MIFFLIN-ST. JEOR: SCORE: 1702

## 2021-08-31 NOTE — NURSING NOTE
Prior to immunization administration, verified patients identity using patient s name and date of birth. Please see Immunization Activity for additional information.     Screening Questionnaire for Adult Immunization    Are you sick today?   No   Do you have allergies to medications, food, a vaccine component or latex?   No   Have you ever had a serious reaction after receiving a vaccination?   No   Do you have a long-term health problem with heart, lung, kidney, or metabolic disease (e.g., diabetes), asthma, a blood disorder, no spleen, complement component deficiency, a cochlear implant, or a spinal fluid leak?  Are you on long-term aspirin therapy?   No   Do you have cancer, leukemia, HIV/AIDS, or any other immune system problem?   No   Do you have a parent, brother, or sister with an immune system problem?   No   In the past 3 months, have you taken medications that affect  your immune system, such as prednisone, other steroids, or anticancer drugs; drugs for the treatment of rheumatoid arthritis, Crohn s disease, or psoriasis; or have you had radiation treatments?   No   Have you had a seizure, or a brain or other nervous system problem?   No   During the past year, have you received a transfusion of blood or blood    products, or been given immune (gamma) globulin or antiviral drug?   No   For women: Are you pregnant or is there a chance you could become       pregnant during the next month?   No   Have you received any vaccinations in the past 4 weeks?   No     Immunization questionnaire answers were all negative.        Per orders of SHILO, injection of  P23 given by Taryn Mccann. Patient instructed to remain in clinic for 15 minutes afterwards, and to report any adverse reaction to me immediately.

## 2021-08-31 NOTE — RESULT ENCOUNTER NOTE
Jackie,  Your labs that have returned are normal. More labs are still processing. Qian Choudhary, DNP

## 2021-08-31 NOTE — PROGRESS NOTES
Assessment & Plan       ICD-10-CM    1. Acute deep vein thrombosis (DVT) of right peroneal vein (H)  I82.451 Oncology/Hematology Adult Referral     CBC with platelets and differential     Comprehensive metabolic panel   2. Advanced directives, counseling/discussion  Z71.89     will bring copy   3. Right calf pain  M79.661 US Lower Extremity Venous Duplex Right     D dimer, quantitative     CBC with platelets and differential     INR     Partial thromboplastin time     D dimer, quantitative     CBC with platelets and differential     INR     Partial thromboplastin time     rivaroxaban ANTICOAGULANT (XARELTO ANTICOAGULANT) 15 MG TABS tablet     rivaroxaban ANTICOAGULANT (XARELTO ANTICOAGULANT) 20 MG TABS tablet   4. Need for vaccination  Z23 PNEUMOCOCCAL VACCINE,ADULT,SQ OR IM ([id number])      Pneumococcal vaccine updated.   Negative lab testing surprisingly.   Recovered radiology call, positive for DVT on right leg   Started on Xarelto same day. Leg pain should be subsiding, warnign signs discussed.   3 month DOAC use advised.   Hematology referral- has Factor V trait and family HX of DVT in her mother.   Pt. Aware of plan.  Return to clinic with any new or worsening symptoms, and as needed.                Return in about 1 day (around 9/1/2021) for Specialty follow-up/Referral.    LAI Hinton CNP  [facility] [address] CYNDIE Mejia is a 36 year old who presents for the following health issues     HPI     Musculoskeletal problem/pain  Onset/Duration: 2 weeks, intermittent pain, mild. Seems to be getting better over past couple days. Onset after a larger walk, thought is was a strain.   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   Description  Location: calf - right  Joint Swelling: no  Redness: no  Pain: YES  Warmth: noworseningIntensity:  varies  Progression of Symptoms:  worsening  Accompanying signs and symptoms:   Fevers: no  Numbness/tingling/weakness: no  History  Trauma to  "the area: no  Recent illness:  no  Previous similar problem: no  Previous evaluation:  no  Precipitating or alleviating factors:  Aggravating factors include: none. Happens sporadically  Therapies tried and outcome: ice    Due for vaccines.   Has PE upcoming.       Review of Systems   Constitutional, HEENT, cardiovascular, pulmonary, gi and gu systems are negative, except as otherwise noted.      Objective    /76   Pulse 65   Temp 98.1  F (36.7  C) (Temporal)   Resp 16   Ht 1.76 m (5' 9.29\")   Wt 95.3 kg (210 lb 1.6 oz)   SpO2 100%   BMI 30.77 kg/m    Body mass index is 30.77 kg/m .  Physical Exam   GENERAL: healthy, alert and no distress  EYES: Eyes grossly normal to inspection  RESP: lungs clear to auscultation - no rales, rhonchi or wheezes  CV: regular rate and rhythm, normal S1 S2, no S3 or S4, no murmur, click or rub, no peripheral edema and peripheral pulses strong  MS: extremities normal. Calf measurements within 0.5 cm ( 41.5 cm )of each other, no redness or obvious swelling, redness.   Negative Susi's sign.   SKIN: no suspicious lesions or rashes, no redness  NEURO: Normal strength and tone, mentation intact and speech normal  PSYCH: mentation appears normal, affect normal/bright    Office Visit on 08/06/2020   Component Date Value Ref Range Status     Cholesterol 08/06/2020 161  <200 mg/dL Final     Triglycerides 08/06/2020 69  <150 mg/dL Final     HDL Cholesterol 08/06/2020 68  >49 mg/dL Final     LDL Cholesterol Calculated 08/06/2020 79  <100 mg/dL Final    Desirable:       <100 mg/dl     Non HDL Cholesterol 08/06/2020 93  <130 mg/dL Final     TSH 08/06/2020 2.80  0.40 - 4.00 mU/L Final     Glucose 08/06/2020 89  70 - 99 mg/dL Final     WBC 08/06/2020 6.1  4.0 - 11.0 10e9/L Final     RBC Count 08/06/2020 5.05  3.8 - 5.2 10e12/L Final     Hemoglobin 08/06/2020 15.3  11.7 - 15.7 g/dL Final     Hematocrit 08/06/2020 46.8  35.0 - 47.0 % Final     MCV 08/06/2020 93  78 - 100 fl Final     [facility] " 08/06/2020 30.3  26.5 - 33.0 pg Final     MCHC 08/06/2020 32.7  31.5 - 36.5 g/dL Final     RDW 08/06/2020 12.3  10.0 - 15.0 % Final     Platelet Count 08/06/2020 267  150 - 450 10e9/L Final     US Lower Extremity Venous Duplex Right    Result Date: 8/31/2021  EXAMINATION: US LOWER EXTREMITY VENOUS DUPLEX RIGHT  8/31/2021 6:29 PM  CLINICAL HISTORY: Right calf pain. Factor V trait. COMPARISON: None    PROCEDURE COMMENTS: Ultrasound was performed of the deep venous system of the right lower extremity using grayscale, color, and spectral Doppler. FINDINGS: The right common femoral, greater saphenous origin, profunda femoris, femoral, popliteal, and posterior tibial veins are visualized and are patent. Venous waveforms are normal. There is normal response to compression. There is occlusive thrombus in one of the paired peroneal veins which lacks compressibility. The left common femoral vein was used for comparison and is patent with normal venous waveforms.     IMPRESSION: Occlusive thrombus in one of the paired peroneal veins. [Urgent Result: DVT] Finding was identified on 8/31/2021 6:32 PM. Qian Choudhary CNP was contacted by Dr. Carlos Pugh at 8/31/2021 6:39 PM and verbalized understanding of the urgent finding. Provider stated to let the patient leave after the exam and she would follow up with the patient via telephone promptly. I have personally reviewed the examination and initial interpretation and I agree with the findings. ARVIND MOLINA MD   SYSTEM ID:  [id number]

## 2021-09-01 ENCOUNTER — MYC MEDICAL ADVICE (OUTPATIENT)
Dept: FAMILY MEDICINE | Facility: CLINIC | Age: 36
End: 2021-09-01

## 2021-09-01 LAB
APTT PPP: 30 SECONDS (ref 22–38)
D DIMER PPP FEU-MCNC: <0.27 UG/ML FEU (ref 0–0.5)
INR PPP: 1 (ref 0.85–1.15)

## 2021-09-01 NOTE — RESULT ENCOUNTER NOTE
Jackie,  I have reviewed your labs, and they are all normal. If you have questions, please notify me through MyChart or a telephone call.   Qian Choudhary, DNP

## 2021-09-01 NOTE — TELEPHONE ENCOUNTER
No notes on imaging from provider that pt is referring to. Provider please review and advise.    Anju Marlow CMA (AAMA)

## 2021-09-21 ENCOUNTER — LAB (OUTPATIENT)
Dept: LAB | Facility: CLINIC | Age: 36
End: 2021-09-21
Payer: COMMERCIAL

## 2021-09-21 DIAGNOSIS — I82.451 ACUTE DEEP VEIN THROMBOSIS (DVT) OF RIGHT PERONEAL VEIN (H): ICD-10-CM

## 2021-09-21 LAB
ALBUMIN SERPL-MCNC: 3.7 G/DL (ref 3.4–5)
ALP SERPL-CCNC: 66 U/L (ref 40–150)
ALT SERPL W P-5'-P-CCNC: 21 U/L (ref 0–50)
ANION GAP SERPL CALCULATED.3IONS-SCNC: 3 MMOL/L (ref 3–14)
AST SERPL W P-5'-P-CCNC: 13 U/L (ref 0–45)
BASOPHILS # BLD AUTO: 0 10E3/UL (ref 0–0.2)
BASOPHILS NFR BLD AUTO: 0 %
BILIRUB SERPL-MCNC: 0.8 MG/DL (ref 0.2–1.3)
BUN SERPL-MCNC: 11 MG/DL (ref 7–30)
CALCIUM SERPL-MCNC: 8.6 MG/DL (ref 8.5–10.1)
CHLORIDE BLD-SCNC: 111 MMOL/L (ref 94–109)
CO2 SERPL-SCNC: 26 MMOL/L (ref 20–32)
CREAT SERPL-MCNC: 0.86 MG/DL (ref 0.52–1.04)
EOSINOPHIL # BLD AUTO: 0.2 10E3/UL (ref 0–0.7)
EOSINOPHIL NFR BLD AUTO: 5 %
ERYTHROCYTE [DISTWIDTH] IN BLOOD BY AUTOMATED COUNT: 12.4 % (ref 10–15)
GFR SERPL CREATININE-BSD FRML MDRD: 86 ML/MIN/1.73M2
GLUCOSE BLD-MCNC: 95 MG/DL (ref 70–99)
HCT VFR BLD AUTO: 40.7 % (ref 35–47)
HGB BLD-MCNC: 14 G/DL (ref 11.7–15.7)
LYMPHOCYTES # BLD AUTO: 1.6 10E3/UL (ref 0.8–5.3)
LYMPHOCYTES NFR BLD AUTO: 34 %
MCH RBC QN AUTO: 30.6 PG (ref 26.5–33)
MCHC RBC AUTO-ENTMCNC: 34.4 G/DL (ref 31.5–36.5)
MCV RBC AUTO: 89 FL (ref 78–100)
MONOCYTES # BLD AUTO: 0.4 10E3/UL (ref 0–1.3)
MONOCYTES NFR BLD AUTO: 8 %
NEUTROPHILS # BLD AUTO: 2.5 10E3/UL (ref 1.6–8.3)
NEUTROPHILS NFR BLD AUTO: 54 %
PLATELET # BLD AUTO: 254 10E3/UL (ref 150–450)
POTASSIUM BLD-SCNC: 4.2 MMOL/L (ref 3.4–5.3)
PROT SERPL-MCNC: 7.2 G/DL (ref 6.8–8.8)
RBC # BLD AUTO: 4.57 10E6/UL (ref 3.8–5.2)
SODIUM SERPL-SCNC: 140 MMOL/L (ref 133–144)
WBC # BLD AUTO: 4.7 10E3/UL (ref 4–11)

## 2021-09-21 PROCEDURE — 80053 COMPREHEN METABOLIC PANEL: CPT

## 2021-09-21 PROCEDURE — 85025 COMPLETE CBC W/AUTO DIFF WBC: CPT

## 2021-09-21 PROCEDURE — 36415 COLL VENOUS BLD VENIPUNCTURE: CPT

## 2021-10-03 ENCOUNTER — HEALTH MAINTENANCE LETTER (OUTPATIENT)
Age: 36
End: 2021-10-03

## 2021-10-06 ENCOUNTER — MYC MEDICAL ADVICE (OUTPATIENT)
Dept: FAMILY MEDICINE | Facility: CLINIC | Age: 36
End: 2021-10-06

## 2021-10-07 NOTE — TELEPHONE ENCOUNTER
Spoke with patient.     Reviewed messages and discussed.     Sounds unlikely to be a blood clot especially as she has been on the xarelto 20 mg daily folowing the 15 mg twice daily. The swelling also improved overnight with her feet up.     She will monitor. Discussed activity, limiting salt. Notify if worsening or new symptoms.

## 2021-10-20 NOTE — PROGRESS NOTES
SUBJECTIVE:   CC: Jackie Juan is an 36 year old woman who presents for preventive health visit.       Patient has been advised of split billing requirements and indicates understanding: Yes  Healthy Habits:     Getting at least 3 servings of Calcium per day:  Yes    Bi-annual eye exam:  NO    Dental care twice a year:  Yes    Sleep apnea or symptoms of sleep apnea:  None    Diet:  Breakfast skipped    Frequency of exercise:  1 day/week    Duration of exercise:  30-45 minutes    Taking medications regularly:  Yes    Medication side effects:  None    PHQ-2 Total Score: 0    Additional concerns today:  Yes      DVT - diagnosed in August. On Xarelto. Seeing hematology in December. Mom has Factor 5 mutation per patient.  Patient also with factor V Leiden mutation.    Hypothyroidism Follow-up      Since last visit, patient describes the following symptoms: Weight stable, no hair loss, no skin changes, no constipation, no loose stools      Today's PHQ-2 Score:   PHQ-2 (  [employer]) 10/22/2021   Q1: Little interest or pleasure in doing things 0   Q2: Feeling down, depressed or hopeless 0   PHQ-2 Score 0   Q1: Little interest or pleasure in doing things Not at all   Q2: Feeling down, depressed or hopeless Not at all   PHQ-2 Score 0       Abuse: Current or Past (Physical, Sexual or Emotional) - No  Do you feel safe in your environment? Yes        Social History     Tobacco Use     Smoking status: Former Smoker     Types: Cigarettes     Start date: 2011     Quit date: 2020     Years since quittin.1     Smokeless tobacco: Never Used   Substance Use Topics     Alcohol use: Yes     Comment: occasional- 2-3 on weekends          Alcohol Use 10/22/2021   Prescreen: >3 drinks/day or >7 drinks/week? No   Prescreen: >3 drinks/day or >7 drinks/week? -       Reviewed orders with patient.  Reviewed health maintenance and updated orders accordingly - Yes  BP Readings from Last 3 Encounters:   10/22/21 124/66   21  120/76   06/23/21 120/80    Wt Readings from Last 3 Encounters:   10/22/21 106.1 kg (234 lb)   08/31/21 95.3 kg (210 lb 1.6 oz)   06/23/21 105.7 kg (233 lb)                    Breast Cancer Screening:  Any new diagnosis of family breast, ovarian, or bowel cancer? No    FHS-7:   Breast CA Risk Assessment (FHS-7) 10/22/2021   Did any of your first-degree relatives have breast or ovarian cancer? No   Did any of your relatives have bilateral breast cancer? Unknown   Did any man in your family have breast cancer? No   Did any woman in your family have breast and ovarian cancer? No   Did any woman in your family have breast cancer before age 50 y? No   Do you have 2 or more relatives with breast and/or ovarian cancer? No   Do you have 2 or more relatives with breast and/or bowel cancer? No       Patient under 40 years of age: Routine Mammogram Screening not recommended.   Pertinent mammograms are reviewed under the imaging tab.    History of abnormal Pap smear: NO - age 30-65 PAP every 5 years with negative HPV co-testing recommended  PAP / HPV Latest Ref Rng & Units 5/10/2017 2/11/2014   PAP (Historical) - NIL NIL   HPV16 NEG Negative -   HPV18 NEG Negative -   HRHPV NEG Negative -     Reviewed and updated as needed this visit by clinical staff  Tobacco  Allergies  Meds  Problems  Med Hx  Surg Hx  Fam Hx  Soc Hx          Reviewed and updated as needed this visit by Provider  Tobacco  Allergies  Meds  Problems  Med Hx  Surg Hx  Fam Hx             Review of Systems   Constitutional: Negative for chills and fever.   HENT: Negative for congestion, ear pain, hearing loss and sore throat.    Eyes: Negative for pain and visual disturbance.   Respiratory: Negative for cough and shortness of breath.    Cardiovascular: Negative for chest pain, palpitations and peripheral edema.   Gastrointestinal: Negative for abdominal pain, constipation, diarrhea, heartburn, hematochezia and nausea.   Breasts:  Negative for  "tenderness, breast mass and discharge.   Genitourinary: Negative for dysuria, frequency, genital sores, hematuria, pelvic pain, urgency, vaginal bleeding and vaginal discharge.   Musculoskeletal: Negative for arthralgias, joint swelling and myalgias.   Skin: Negative for rash.   Neurological: Negative for dizziness, weakness, headaches and paresthesias.   Psychiatric/Behavioral: Negative for mood changes. The patient is not nervous/anxious.      CONSTITUTIONAL: NEGATIVE for fever, chills, change in weight  INTEGUMENTARU/SKIN: NEGATIVE for worrisome rashes, moles or lesions  EYES: NEGATIVE for vision changes or irritation  ENT: NEGATIVE for ear, mouth and throat problems  RESP: NEGATIVE for significant cough or SOB  BREAST: NEGATIVE for masses, tenderness or discharge  CV: NEGATIVE for chest pain, palpitations or peripheral edema  GI: NEGATIVE for nausea, abdominal pain, heartburn, or change in bowel habits  : NEGATIVE for unusual urinary or vaginal symptoms. Periods are regular.  MUSCULOSKELETAL: NEGATIVE for significant arthralgias or myalgia  NEURO: NEGATIVE for weakness, dizziness or paresthesias  PSYCHIATRIC: NEGATIVE for changes in mood or affect     OBJECTIVE:   /66   Pulse 73   Temp 98.4  F (36.9  C) (Temporal)   Resp 18   Ht 1.76 m (5' 9.29\")   Wt 106.1 kg (234 lb)   LMP 10/16/2021 (Exact Date)   SpO2 100%   Breastfeeding No   BMI 34.27 kg/m    Physical Exam  GENERAL: healthy, alert and no distress  EYES: Eyes grossly normal to inspection, PERRL and conjunctivae and sclerae normal  HENT: ear canals and TM's normal, nose and mouth without ulcers or lesions  NECK: no adenopathy, no asymmetry, masses, or scars and thyroid normal to palpation  RESP: lungs clear to auscultation - no rales, rhonchi or wheezes  BREAST: normal without masses, tenderness or nipple discharge and no palpable axillary masses or adenopathy  CV: regular rate and rhythm, normal S1 S2, no S3 or S4, no murmur, click or rub, " no peripheral edema and peripheral pulses strong  ABDOMEN: soft, nontender, no hepatosplenomegaly, no masses and bowel sounds normal  MS: no gross musculoskeletal defects noted, no edema  SKIN: no suspicious lesions or rashes  NEURO: Normal strength and tone, mentation intact and speech normal  PSYCH: mentation appears normal, affect normal/bright    Diagnostic Test Results:  Labs reviewed in Epic    ASSESSMENT/PLAN:   (Z00.00) Routine general medical examination at a health care facility  (primary encounter diagnosis)  Comment: See counseling messages  Plan: Recheck in 1 year    (E03.9) Hypothyroidism, unspecified type  Comment: Awaiting results. Dose adjustment as needed.    Plan: TSH WITH FREE T4 REFLEX            (I82.4Y1) Acute deep vein thrombosis (DVT) of proximal vein of right lower extremity (H)  (D68.51) Factor 5 Leiden mutation, heterozygous (H)  Comment: patient with recent DVT with history of Factor 5. Currently on Xarelto. Will be seeing hematology in November. Continue Xarelto until visit with hematology.    (Z13.1) Screening for diabetes mellitus  Comment: fasting  Plan: Comprehensive metabolic panel (BMP + Alb, Alk         Phos, ALT, AST, Total. Bili, TP)        Will notify of results.     (Z13.220) Screening for hyperlipidemia  Comment: fasting  Plan: Lipid panel reflex to direct LDL Fasting        Will notify of results.     (Z11.59) Need for hepatitis C screening test  Comment: discussed  Plan: Hepatitis C Screen Reflex to HCV RNA Quant and         Genotype        Will notify of results.     (Z23) Need for prophylactic vaccination and inoculation against influenza  Comment: recommend  Plan: done.     Patient has been advised of split billing requirements and indicates understanding: No  COUNSELING:  Reviewed preventive health counseling, as reflected in patient instructions       Regular exercise       Healthy diet/nutrition    Estimated body mass index is 34.27 kg/m  as calculated from the  "following:    Height as of this encounter: 1.76 m (5' 9.29\").    Weight as of this encounter: 106.1 kg (234 lb).    Weight management plan: Discussed healthy diet and exercise guidelines    She reports that she quit smoking about 13 months ago. Her smoking use included cigarettes. She started smoking about 10 years ago. She has never used smokeless tobacco.      Counseling Resources:  ATP IV Guidelines  Pooled Cohorts Equation Calculator  Breast Cancer Risk Calculator  BRCA-Related Cancer Risk Assessment: FHS-7 Tool  FRAX Risk Assessment  ICSI Preventive Guidelines  Dietary Guidelines for Americans, 2010  USDA's MyPlate  ASA Prophylaxis  Lung CA Screening    Sabrina Hdez MD  [facility]NDIE  "

## 2021-10-22 ENCOUNTER — OFFICE VISIT (OUTPATIENT)
Dept: FAMILY MEDICINE | Facility: CLINIC | Age: 36
End: 2021-10-22
Payer: COMMERCIAL

## 2021-10-22 VITALS
HEART RATE: 73 BPM | DIASTOLIC BLOOD PRESSURE: 66 MMHG | OXYGEN SATURATION: 100 % | TEMPERATURE: 98.4 F | WEIGHT: 234 LBS | BODY MASS INDEX: 34.66 KG/M2 | HEIGHT: 69 IN | RESPIRATION RATE: 18 BRPM | SYSTOLIC BLOOD PRESSURE: 124 MMHG

## 2021-10-22 DIAGNOSIS — E03.9 HYPOTHYROIDISM, UNSPECIFIED TYPE: ICD-10-CM

## 2021-10-22 DIAGNOSIS — Z00.00 ROUTINE GENERAL MEDICAL EXAMINATION AT A HEALTH CARE FACILITY: Primary | ICD-10-CM

## 2021-10-22 DIAGNOSIS — Z13.220 SCREENING FOR HYPERLIPIDEMIA: ICD-10-CM

## 2021-10-22 DIAGNOSIS — Z11.59 NEED FOR HEPATITIS C SCREENING TEST: ICD-10-CM

## 2021-10-22 DIAGNOSIS — Z23 NEED FOR PROPHYLACTIC VACCINATION AND INOCULATION AGAINST INFLUENZA: ICD-10-CM

## 2021-10-22 DIAGNOSIS — I82.4Y1 ACUTE DEEP VEIN THROMBOSIS (DVT) OF PROXIMAL VEIN OF RIGHT LOWER EXTREMITY (H): ICD-10-CM

## 2021-10-22 DIAGNOSIS — Z13.1 SCREENING FOR DIABETES MELLITUS: ICD-10-CM

## 2021-10-22 DIAGNOSIS — D68.51 FACTOR 5 LEIDEN MUTATION, HETEROZYGOUS (H): Chronic | ICD-10-CM

## 2021-10-22 PROCEDURE — 86803 HEPATITIS C AB TEST: CPT | Performed by: FAMILY MEDICINE

## 2021-10-22 PROCEDURE — 99213 OFFICE O/P EST LOW 20 MIN: CPT | Mod: 25 | Performed by: FAMILY MEDICINE

## 2021-10-22 PROCEDURE — 36415 COLL VENOUS BLD VENIPUNCTURE: CPT | Performed by: FAMILY MEDICINE

## 2021-10-22 PROCEDURE — 84443 ASSAY THYROID STIM HORMONE: CPT | Performed by: FAMILY MEDICINE

## 2021-10-22 PROCEDURE — 99395 PREV VISIT EST AGE 18-39: CPT | Mod: 25 | Performed by: FAMILY MEDICINE

## 2021-10-22 PROCEDURE — 90686 IIV4 VACC NO PRSV 0.5 ML IM: CPT | Performed by: FAMILY MEDICINE

## 2021-10-22 PROCEDURE — 90471 IMMUNIZATION ADMIN: CPT | Performed by: FAMILY MEDICINE

## 2021-10-22 PROCEDURE — 80061 LIPID PANEL: CPT | Performed by: FAMILY MEDICINE

## 2021-10-22 PROCEDURE — 80053 COMPREHEN METABOLIC PANEL: CPT | Performed by: FAMILY MEDICINE

## 2021-10-22 ASSESSMENT — ENCOUNTER SYMPTOMS
HEARTBURN: 0
JOINT SWELLING: 0
HEMATURIA: 0
ABDOMINAL PAIN: 0
FEVER: 0
MYALGIAS: 0
HEADACHES: 0
PALPITATIONS: 0
FREQUENCY: 0
CONSTIPATION: 0
BREAST MASS: 0
DIZZINESS: 0
NAUSEA: 0
SHORTNESS OF BREATH: 0
PARESTHESIAS: 0
DYSURIA: 0
ARTHRALGIAS: 0
HEMATOCHEZIA: 0
CHILLS: 0
SORE THROAT: 0
WEAKNESS: 0
COUGH: 0
EYE PAIN: 0
DIARRHEA: 0
NERVOUS/ANXIOUS: 0

## 2021-10-22 ASSESSMENT — MIFFLIN-ST. JEOR: SCORE: 1810.4

## 2021-10-22 ASSESSMENT — PAIN SCALES - GENERAL: PAINLEVEL: NO PAIN (0)

## 2021-10-25 LAB
ALBUMIN SERPL-MCNC: 4.1 G/DL (ref 3.4–5)
ALP SERPL-CCNC: 59 U/L (ref 40–150)
ALT SERPL W P-5'-P-CCNC: 20 U/L (ref 0–50)
ANION GAP SERPL CALCULATED.3IONS-SCNC: 4 MMOL/L (ref 3–14)
AST SERPL W P-5'-P-CCNC: 14 U/L (ref 0–45)
BILIRUB SERPL-MCNC: 1.2 MG/DL (ref 0.2–1.3)
BUN SERPL-MCNC: 13 MG/DL (ref 7–30)
CALCIUM SERPL-MCNC: 9.1 MG/DL (ref 8.5–10.1)
CHLORIDE BLD-SCNC: 109 MMOL/L (ref 94–109)
CHOLEST SERPL-MCNC: 158 MG/DL
CO2 SERPL-SCNC: 27 MMOL/L (ref 20–32)
CREAT SERPL-MCNC: 0.79 MG/DL (ref 0.52–1.04)
FASTING STATUS PATIENT QL REPORTED: YES
GFR SERPL CREATININE-BSD FRML MDRD: >90 ML/MIN/1.73M2
GLUCOSE BLD-MCNC: 78 MG/DL (ref 70–99)
HDLC SERPL-MCNC: 64 MG/DL
LDLC SERPL CALC-MCNC: 80 MG/DL
NONHDLC SERPL-MCNC: 94 MG/DL
POTASSIUM BLD-SCNC: 3.5 MMOL/L (ref 3.4–5.3)
PROT SERPL-MCNC: 7.4 G/DL (ref 6.8–8.8)
SODIUM SERPL-SCNC: 140 MMOL/L (ref 133–144)
TRIGL SERPL-MCNC: 69 MG/DL
TSH SERPL DL<=0.005 MIU/L-ACNC: 3.58 MU/L (ref 0.4–4)

## 2021-10-26 LAB — HCV AB SERPL QL IA: NONREACTIVE

## 2021-11-19 DIAGNOSIS — E03.9 HYPOTHYROIDISM, UNSPECIFIED TYPE: ICD-10-CM

## 2021-11-19 RX ORDER — LEVOTHYROXINE SODIUM 75 UG/1
TABLET ORAL
Qty: 90 TABLET | Refills: 3 | Status: SHIPPED | OUTPATIENT
Start: 2021-11-19 | End: 2022-08-02

## 2021-11-25 DIAGNOSIS — B00.1 RECURRENT COLD SORES: ICD-10-CM

## 2021-11-26 RX ORDER — ACYCLOVIR 400 MG/1
TABLET ORAL
Qty: 30 TABLET | Refills: 1 | Status: SHIPPED | OUTPATIENT
Start: 2021-11-26 | End: 2023-11-01

## 2021-11-26 NOTE — TELEPHONE ENCOUNTER
"Requested Prescriptions   Pending Prescriptions Disp Refills     acyclovir (ZOVIRAX) 400 MG tablet [Pharmacy Med Name: ACYCLOVIR 400MG TABLETS] 30 tablet 1     Sig: TAKE 1 TABLET(400 MG) BY MOUTH TWICE DAILY       Antivirals for Herpes Protocol Passed - 11/25/2021 10:05 AM        Passed - Patient is age 12 or older        Passed - Recent (12 mo) or future (30 days) visit within the authorizing provider's specialty     Patient has had an office visit with the authorizing provider or a provider within the authorizing providers department within the previous 12 mos or has a future within next 30 days. See \"Patient Info\" tab in inbasket, or \"Choose Columns\" in Meds & Orders section of the refill encounter.              Passed - Medication is active on med list        Passed - Normal serum creatinine on file in past 12 months     Recent Labs   Lab Test 10/22/21  1623   CR 0.79       Ok to refill medication if creatinine is low             Prescription approved per [facility] Refill Protocol.    Ramos Contreras, BLACKN, RN    "

## 2021-12-06 ENCOUNTER — VIRTUAL VISIT (OUTPATIENT)
Dept: HEMATOLOGY | Facility: CLINIC | Age: 36
End: 2021-12-06
Attending: NURSE PRACTITIONER
Payer: COMMERCIAL

## 2021-12-06 VITALS — WEIGHT: 224 LBS | BODY MASS INDEX: 32.8 KG/M2

## 2021-12-06 DIAGNOSIS — I82.451 ACUTE DEEP VEIN THROMBOSIS (DVT) OF RIGHT PERONEAL VEIN (H): Primary | ICD-10-CM

## 2021-12-06 DIAGNOSIS — Z82.49 FAMILY HISTORY OF DVT: ICD-10-CM

## 2021-12-06 DIAGNOSIS — Z83.2 FAMILY HISTORY OF FACTOR V LEIDEN MUTATION: ICD-10-CM

## 2021-12-06 DIAGNOSIS — D68.51 FACTOR 5 LEIDEN MUTATION, HETEROZYGOUS (H): Chronic | ICD-10-CM

## 2021-12-06 PROCEDURE — 99204 OFFICE O/P NEW MOD 45 MIN: CPT | Mod: 95 | Performed by: PHYSICIAN ASSISTANT

## 2021-12-06 NOTE — PROGRESS NOTES
Patient was contacted to complete the pre-visit call prior to their video visit with the provider.      Allergies and medications were reviewed.    I thanked them for their time to cover this information     Jerry Ramos CMA

## 2021-12-06 NOTE — PATIENT INSTRUCTIONS
Jackie,    It was nice to meet you via video visit today.    Below is a summary of our plan:  1. Continue with rivaroxaban (Xarelto) at 20 mg by mouth every 24 hours for now. I have sent a refill prescription to your pharmacy on file.  2. One of the nursing staff will contact you in the next few days to help coordinate: 1) repeat ultrasound of your right leg and 2) remaining of the [facility] thrombophilia labs.  3. Once the above lab test and imaging reports are back, I will give you a call.   4. In the mean time, discuss with your  (and/or others) based on the fact that I have provided you during our visit today and decide if you would like to stay on indefinite anticoagulation therapy or not. If you decided not to stay on long term anticoagulation therapy, one other option is to do episodic prevention at times of increased thrombotic risk.  5. In the mean time, if you should have any further questions, please call [phone] and ask to speak to a nursing staff.    Thank you once again in choosing our clinic as part of your healthcare team.      Lizandro Zimmer PA-C, MPAS  Physician Assistant  [facility] for [facility].

## 2021-12-06 NOTE — PROGRESS NOTES
[facility]  [address] [place] [address] [address], [place] [address]  Main: [phone], Fax: [phone]    [facility] seen at: [facility] at [address] [place] [address]    Video Virtual Visit Note:    Patient: Jackie Juan  MRN: [id number]  : 1985  RAMANDEEP: 2021    Due to the ongoing COVID-19 outbreak, this visit was conducted by video, with the patient's approval.      Reason of today's visit:  Right distal lower extremity DVT on 2021.     HPI:  Jackei is a 36 year old female with a family history of factor V Leiden mutation and DVT with her mother, who also is found to have heterozygous factor V Leiden mutation back in , developed a right distal lower extremity DVT on 2021, referred by her primary care provider, Qian Choudhary CNP for consultation. Back on 2021, she presented to her primary care provider's office for evaluation of a 2 weeks history of intermittent pain over the right calf worse after prolonged ambulation. Ultrasound at the time showed: Occlusive thrombus in one of the paired peroneal veins. For this reason, she was placed on rivaroxaban.     Jackie's mother has bilateral lower extremity DVT and bilateral pulmonary embolism back about 20 years ago when she was in her mid-40's. Her venous thromboembolic event was thought to be unprovoked. She was later found to have factor V Leiden mutation and thus Jackie was tested for this mutation back in  and is found to have heterozygous factor V Leiden mutation. Because of this finding, Jackie has not been on estrogen containing OCP.     Jackie reports that she did fly from [place] to [place] (which is about 1.5 hours flight) back in Mid 2021. Otherwise, she recalls no preceding hospitalization, surgery, traumatic injuries, infection or other long distance travelling. She also recalls no preceding prolonged immobilization. However, she did recently in the past few  weeks using a different setting for her cross-stitching hobby where she basically sit cross legged with a 2x4 plank on her leg for 3-4 hours at a time.     Currently she is on rivaroxaban at 20 mg PO Qday dosing. Denies any bleeding issues. She denies any frequent epistaxis. No oral mucosal bleeding. Denies any hematuria or blood in stools. Denies any abdominal pain or chest pain. No shortness of breath. Right calf pain has mostly resolved.     ROS:  As above.     Medication:  Current Outpatient Medications   Medication     acyclovir (ZOVIRAX) 400 MG tablet     levothyroxine (SYNTHROID/LEVOTHROID) 75 MCG tablet     rivaroxaban ANTICOAGULANT (XARELTO ANTICOAGULANT) 20 MG TABS tablet     No current facility-administered medications for this visit.     Allergies:  Allergies   Allergen Reactions     Shellfish Allergy      PmHx:  Past Medical History:   Diagnosis Date     Factor 5 Leiden mutation, heterozygous 2/11/2014     Factor 5 Leiden mutation, heterozygous (H)      Thyroid disease      Social History:  Deferred.    Family History:    Mother with a DVT/PE history as described above.     Father with no history of DVT/PE.     She has 3 older brothers. One with factor V Leiden mutation. One has not been tested and she does not know if the third brother has been tested or not.    She has no children of her own.     Objective:  Pleasant in no acute distress.  Visual Examination via Video:  Complete exam is not performed today.     Labs:  Component      Latest Ref Rng & Units 9/21/2021 10/22/2021   WBC      4.0 - 11.0 10e3/uL 4.7    RBC Count      3.80 - 5.20 10e6/uL 4.57    Hemoglobin      11.7 - 15.7 g/dL 14.0    Hematocrit      35.0 - 47.0 % 40.7    MCV      78 - 100 fL 89    MCH      26.5 - 33.0 pg 30.6    MCHC      31.5 - 36.5 g/dL 34.4    RDW      10.0 - 15.0 % 12.4    Platelet Count      150 - 450 10e3/uL 254    % Neutrophils      % 54    % Lymphocytes      % 34    % Monocytes      % 8    % Eosinophils      % 5     % Basophils      % 0    Absolute Neutrophils      1.6 - 8.3 10e3/uL 2.5    Absolute Lymphocytes      0.8 - 5.3 10e3/uL 1.6    Absolute Monocytes      0.0 - 1.3 10e3/uL 0.4    Absolute Eosinophils      0.0 - 0.7 10e3/uL 0.2    Absolute Basophils      0.0 - 0.2 10e3/uL 0.0    Sodium      133 - 144 mmol/L  140   Potassium      3.4 - 5.3 mmol/L  3.5   Chloride      94 - 109 mmol/L  109   Carbon Dioxide      20 - 32 mmol/L  27   Anion Gap      3 - 14 mmol/L  4   Urea Nitrogen      7 - 30 mg/dL  13   Creatinine      0.52 - 1.04 mg/dL  0.79   Calcium      8.5 - 10.1 mg/dL  9.1   Glucose      70 - 99 mg/dL  78   Alkaline Phosphatase      40 - 150 U/L  59   AST      0 - 45 U/L  14   ALT      0 - 50 U/L  20   Protein Total      6.8 - 8.8 g/dL  7.4   Albumin      3.4 - 5.0 g/dL  4.1   Bilirubin Total      0.2 - 1.3 mg/dL  1.2   GFR Estimate      >60 mL/min/1.73m2  >90       Imaging:  Right leg venous ultrasound done on 8/31/2021:  Occlusive thrombus in one of the paired peroneal veins.    Assessment:  In summary, Jackie is a 36 year old female with a family history of DVT/PE and factor V Leiden mutation, who is known to have heterozygous factor V Leiden mutation, who is found to have a distal right leg DVT back in 8/31/2021, referred to our clinic for consultation. Jackie's right distal lower extremity DVT in 8/31/2021 was an unprovoked venous thromboembolic event or may be minimally provoked by the fact that she was sitting cross legged doing cross stitches with a 2x4 plank on her leg for 3-4 hours at a time for the couple weeks prior to her diagnosis of DVT.     Diagnosis:  1. Right distal lower extremity DVT on 8/31/2021, likely unprovoked.  2. Heterozygous factor V Leiden mutation.   3. Family history of DVT/PE.   4. Family history of factor V Leiden mutation.     Plan:  I have a long discussion with Jackie in regard to our plan and recommendation.     I took some time to educate Jackie in regard to DVT/PE and factor V Leiden  mutation.     I explain to Jackie that back before 2018, the CHEST guideline recommends only 3 months of anticoagulation therapy for patients with a distal lower extremity DVT regardless if the thrombotic event was a provoked event vs an unprovoked event. However, the most recent American Society of Hematology (GINO) guidelines does not distinguish if the lower extremity thrombus was distal vs proximal and recommends indefinite anticoagulation therapy for patients with an unprovoked venous thrombotic event. Thus in this particular case, based on current GINO guidelines, Jackie should consider staying on indefinite anticoagulation therapy for future secondary pharmacological DVT/PE prophylaxis.     I explain that Factor V Leiden mutation is a common genetic thrombophilia, about 5% of the general population carries the mutation. Only about 10% of whom with the mutation would ever develop a thromboembolic event in their lifetime and is usually associated with other provoking factors. People with heterozygous Factor V Leiden Mutation has a 5 folds increase risk of venous thrombosis. For comparison, women who are on estrogen based birth control have about a 7 fold increase risk of venous thrombosis. Hence heterozygous Factor V Leiden Mutation is only a minimal increase risk factor for DVT/PE.     In Jackie's case, she also has a family history of DVT/PE as well as the fact that she has heterozygous factor V Leiden mutation, she is at risk for recurrent venous thromboembolism in the future.     Furthermore, it is possible that she has other inherit thrombophilia. She never had other inherit thrombophilia tests done.     At this time, my plan is to keep her on rivaroxaban at 20 mg PO Qday, get a repeat right leg venous ultrasound in the next 1-2 weeks, check the rest of the inherit thrombophilia and then I will contact her to discuss the ultimate duration of her anticoagulation therapy. I explain to her that it is ultimately her  own informed decision as in regard to the ultimate duration of her anticoagulation therapy given the above considerations. She will discuss all these facts with her  as well.    For all patients with a history of venous thromboembolism, we general recommend age appropriate cancer screening and I will defer this to her primary care provider.     Plan Summary:  1. Continue rivaroxaban at 20 mg PO Qday for now.   2. Get repeat right leg venous ultrasound in the next 1-2 weeks.  3. Complete the rest of the inherit thrombophilia workup.   4. I will contact her once the above labs and imaging report is back.    46 minutes spent on the date of the encounter doing chart review, history and exam, documentation and further activities per the note     Video-Visit Details:  Type of service:  Video Visit  Video Start Time: 13:53  Video End Time (time video stopped): 14:25  Originating Location (pt. Location): Home  Distant Location (provider location):  [facility]   Mode of Communication:  Video Conference via [employer]      Lizandro Zimmer PA-C, MPAS  Physician Assistant  [facility] for [facility].

## 2021-12-13 ENCOUNTER — TELEPHONE (OUTPATIENT)
Dept: HEMATOLOGY | Facility: CLINIC | Age: 36
End: 2021-12-13
Payer: COMMERCIAL

## 2021-12-13 NOTE — TELEPHONE ENCOUNTER
Jackie Juan had a virtual visit with Lizandro Zimmer PA-C on 12/6/2021. RN assisted patient it getting scheduled for an ultrasound and labs, both will be done 12/14/2021 at [place].    Ann Marie Gomez RN - Nurse Clinician - [facility] - [phone]

## 2021-12-14 ENCOUNTER — LAB (OUTPATIENT)
Dept: LAB | Facility: CLINIC | Age: 36
End: 2021-12-14
Payer: COMMERCIAL

## 2021-12-14 ENCOUNTER — ANCILLARY PROCEDURE (OUTPATIENT)
Dept: ULTRASOUND IMAGING | Facility: CLINIC | Age: 36
End: 2021-12-14
Attending: PHYSICIAN ASSISTANT
Payer: COMMERCIAL

## 2021-12-14 DIAGNOSIS — I82.451 ACUTE DEEP VEIN THROMBOSIS (DVT) OF RIGHT PERONEAL VEIN (H): ICD-10-CM

## 2021-12-14 LAB
FACTOR 2 INTERPRETATION: NORMAL
LAB DIRECTOR COMMENTS: NORMAL
LAB DIRECTOR DISCLAIMER: NORMAL
LAB DIRECTOR INTERPRETATION: NORMAL
LAB DIRECTOR METHODOLOGY: NORMAL
LAB DIRECTOR RESULTS: NORMAL
SPECIMEN DESCRIPTION: NORMAL

## 2021-12-14 PROCEDURE — 93971 EXTREMITY STUDY: CPT | Mod: RT | Performed by: RADIOLOGY

## 2021-12-14 PROCEDURE — 81240 F2 GENE: CPT

## 2021-12-14 PROCEDURE — 85300 ANTITHROMBIN III ACTIVITY: CPT

## 2021-12-14 PROCEDURE — 36415 COLL VENOUS BLD VENIPUNCTURE: CPT

## 2021-12-14 PROCEDURE — G0452 MOLECULAR PATHOLOGY INTERPR: HCPCS | Performed by: PATHOLOGY

## 2021-12-14 PROCEDURE — 85303 CLOT INHIBIT PROT C ACTIVITY: CPT

## 2021-12-14 PROCEDURE — 85306 CLOT INHIBIT PROT S FREE: CPT

## 2021-12-15 LAB
AT III ACT/NOR PPP CHRO: 95 % (ref 85–135)
PROT C ACT/NOR PPP CHRO: 86 % (ref 70–170)
PROT S FREE AG ACT/NOR PPP IA: 62 % (ref 55–125)

## 2021-12-20 ENCOUNTER — TELEPHONE (OUTPATIENT)
Dept: HEMATOLOGY | Facility: CLINIC | Age: 36
End: 2021-12-20
Payer: COMMERCIAL

## 2021-12-20 DIAGNOSIS — Z82.49 FAMILY HISTORY OF DVT: ICD-10-CM

## 2021-12-20 DIAGNOSIS — D68.51 FACTOR 5 LEIDEN MUTATION, HETEROZYGOUS (H): ICD-10-CM

## 2021-12-20 DIAGNOSIS — Z83.2 FAMILY HISTORY OF FACTOR V LEIDEN MUTATION: ICD-10-CM

## 2021-12-20 DIAGNOSIS — I82.451 ACUTE DEEP VEIN THROMBOSIS (DVT) OF RIGHT PERONEAL VEIN (H): Primary | ICD-10-CM

## 2021-12-20 NOTE — TELEPHONE ENCOUNTER
[facility]  [facility]  [facility], [address], [place] [address]  Main: [phone], Fax: [phone]    Telephone Note:    Patient: Jackie Juan  MRN: [id number]  : 1985  Date of this note written: 2021    This writer called Jackie on 2021 at 11:35am to discuss her lab results and ultrasound report.     Component      Latest Ref Rng & Units 2021   Antithrombin III Chromogenic      85 - 135 % 95   Prot C Chromogenic      70 - 170 % 86   Prothrombin Gene Mutation Negative   Protein S Antigen Free      55 - 125 % 62     Ultrasound 2021 venous right leg:  Persistent thrombus in the right peroneal vein in the proximal/mid calf. Thrombus in the peroneal vein in the distal calf now appears to be resolved.    After speaking to the patient, our plan is as follow:  1. Continue rivaroxaban at 20 mg PO Qday for another 3 months  2. Repeat ultrasound to see if her residual thrombus remains.   3. Patient is considering stopping all anticoagulation therapy if possible but in agreement to treat with anticoagulation therapy for 3 additional months to see if her right leg thrombus resolves.       Lizandro Zimmer PA-C, MPAS  Physician Assistant  [facility] for [facility]ot[facility].

## 2022-02-25 ENCOUNTER — MYC MEDICAL ADVICE (OUTPATIENT)
Dept: FAMILY MEDICINE | Facility: CLINIC | Age: 37
End: 2022-02-25
Payer: COMMERCIAL

## 2022-03-21 ENCOUNTER — ANCILLARY PROCEDURE (OUTPATIENT)
Dept: ULTRASOUND IMAGING | Facility: CLINIC | Age: 37
End: 2022-03-21
Attending: PHYSICIAN ASSISTANT
Payer: COMMERCIAL

## 2022-03-21 DIAGNOSIS — Z82.49 FAMILY HISTORY OF DVT: ICD-10-CM

## 2022-03-21 DIAGNOSIS — D68.51 FACTOR 5 LEIDEN MUTATION, HETEROZYGOUS (H): ICD-10-CM

## 2022-03-21 DIAGNOSIS — Z83.2 FAMILY HISTORY OF FACTOR V LEIDEN MUTATION: ICD-10-CM

## 2022-03-21 DIAGNOSIS — I82.451 ACUTE DEEP VEIN THROMBOSIS (DVT) OF RIGHT PERONEAL VEIN (H): ICD-10-CM

## 2022-03-21 PROCEDURE — 93971 EXTREMITY STUDY: CPT | Mod: RT | Performed by: RADIOLOGY

## 2022-03-22 DIAGNOSIS — Z82.49 FAMILY HISTORY OF DVT: ICD-10-CM

## 2022-03-22 DIAGNOSIS — I82.451 ACUTE DEEP VEIN THROMBOSIS (DVT) OF RIGHT PERONEAL VEIN (H): ICD-10-CM

## 2022-03-22 DIAGNOSIS — D68.51 FACTOR 5 LEIDEN MUTATION, HETEROZYGOUS (H): Primary | ICD-10-CM

## 2022-03-22 NOTE — PROGRESS NOTES
[facility]  [facility]  [facility], [address], [place] [address]  Main: [phone], Fax: [phone]    Telephone Note:    Patient: Jackie Juan  MRN: [id number]  : 1985  Date of this note written: 2022    This writer called the patient on 3/22/2022 at 11:50am and communicated the repeat right leg ultrasound report to her.     3/21/2022 right lower extremity venous ultrasound:  No DVT demonstrated in the right lower extremity. Previously known right peroneal vein thrombus has resolved.    Jackie reports that she had a long discussion with her  and their decision is to stop all anticoagulation therapy if her repeat ultrasound is negative for residual thrombus. She will go on episodic secondary pharmacological DVT/PE prophylaxis from this point on. She will take rivaroxaban at 10 mg PO Q 24 hours as needed just prior to long distance car or flight travels that is > 4 hours.     She is instructed to be vigilant about signs and symptoms of DVT/PE and is instructed to seek immediate medical attention if she should experience any of these signs or symptoms.     She will discontinue her rivaroxaban at 20 mg PO Qday as of today.   At this time, I have no further plans to see her back on a routine basis.       Lizandro Zimmer PA-C, MPAS  Physician Assistant  [facility] for [facility] Clot[facility] Disorders.

## 2022-04-05 ENCOUNTER — E-VISIT (OUTPATIENT)
Dept: URGENT CARE | Facility: CLINIC | Age: 37
End: 2022-04-05
Payer: COMMERCIAL

## 2022-04-05 DIAGNOSIS — N39.0 ACUTE UTI (URINARY TRACT INFECTION): Primary | ICD-10-CM

## 2022-04-05 PROCEDURE — 99421 OL DIG E/M SVC 5-10 MIN: CPT | Performed by: PHYSICIAN ASSISTANT

## 2022-04-05 RX ORDER — NITROFURANTOIN 25; 75 MG/1; MG/1
100 CAPSULE ORAL 2 TIMES DAILY
Qty: 10 CAPSULE | Refills: 0 | Status: SHIPPED | OUTPATIENT
Start: 2022-04-05 | End: 2022-04-10

## 2022-04-06 NOTE — PATIENT INSTRUCTIONS
Dear Jackie Juan    After reviewing your responses, I've been able to diagnose you with a urinary tract infection, which is a common infection of the bladder with bacteria.  This is not a sexually transmitted infection, though urinating immediately after intercourse can help prevent infections.  Drinking lots of fluids is also helpful to clear your current infection and prevent the next one.      I have sent a prescription for antibiotics to your pharmacy to treat this infection.    It is important that you take all of your prescribed medication even if your symptoms are improving after a few doses.  Taking all of your medicine helps prevent the symptoms from returning.     If your symptoms worsen, you develop pain in your back or stomach, develop fevers, or are not improving in 5 days, please contact your primary care provider for an appointment or visit any of our convenient Walk-in or Urgent Care Centers to be seen, which can be found on our website here.    Thanks again for choosing us as your health care partner,    Abdirahman Mejia PA-C    Urinary Tract Infections in Women  Urinary tract infections (UTIs) are most often caused by bacteria. These bacteria enter the urinary tract. The bacteria may come from inside the body. Or they may travel from the skin outside the rectum or vagina into the urethra. Female anatomy makes it easy for bacteria from the bowel to enter a woman s urinary tract, which is the most common source of UTI. This means women develop UTIs more often than men. Pain in or around the urinary tract is a common UTI symptom. But the only way to know for sure if you have a UTI for the healthcare provider to test your urine. The two tests that may be done are the urinalysis and urine culture.     Types of UTIs    Cystitis. A bladder infection (cystitis) is the most common UTI in women. You may have urgent or frequent need to pee. You may also have pain, burning when you pee, and bloody  urine.    Urethritis. This is an inflamed urethra, which is the tube that carries urine from the bladder to outside the body. You may have lower stomach or back pain. You may also have urgent or frequent need to pee.    Pyelonephritis. This is a kidney infection. If not treated, it can be serious and damage your kidneys. In severe cases, you may need to stay in the hospital. You may have a fever and lower back pain.    Medicines to treat a UTI  Most UTIs are treated with antibiotics. These kill the bacteria. The length of time you need to take them depends on the type of infection. It may be as short as 3 days. If you have repeated UTIs, you may need a low-dose antibiotic for several months. Take antibiotics exactly as directed. Don t stop taking them until all of the medicine is gone. If you stop taking the antibiotic too soon, the infection may not go away. You may also develop a resistance to the antibiotic. This can make it much harder to treat.   Lifestyle changes to treat and prevent UTIs   The lifestyle changes below will help get rid of your UTI. They may also help prevent future UTIs.     Drink plenty of fluids. This includes water, juice, or other caffeine-free drinks. Fluids help flush bacteria out of your body.    Empty your bladder. Always empty your bladder when you feel the urge to pee. And always pee before going to sleep. Urine that stays in your bladder can lead to infection. Try to pee before and after sex as well.    Practice good personal hygiene. Wipe yourself from front to back after using the toilet. This helps keep bacteria from getting into the urethra.    Use condoms during sex. These help prevent UTIs caused by sexually transmitted bacteria. Also don't use spermicides during sex. These can increase the risk for UTIs. Choose other forms of birth control instead. For women who tend to get UTIs after sex, a low-dose of a preventive antibiotic may be used. Be sure to discuss this option with  your healthcare provider.    Follow up with your healthcare provider as directed. He or she may test to make sure the infection has cleared. If needed, more treatment may be started.  [place] last reviewed this educational content on 7/1/2019 2000-2021 [employer]. All rights reserved. This information is not intended as a substitute for professional medical care. Always follow your healthcare professional's instructions.

## 2022-05-04 ENCOUNTER — TELEPHONE (OUTPATIENT)
Dept: FAMILY MEDICINE | Facility: CLINIC | Age: 37
End: 2022-05-04

## 2022-05-04 NOTE — CONFIDENTIAL NOTE
Left voicemail and Mychart to reschedule pt. Please help patient reschedule the canceled appt below:       - Provider:  Qian Choudhary           - Canceled appt details         Date: May 18         Time:  2:40 p.m.         Type of visit:  Office visit      - Reason for change/cancellation: clinician out       Notes to consider when reschedulin minutes        Please close encounter once the patient has been rescheduled

## 2022-06-03 NOTE — PROGRESS NOTES
"Jackie is a 36 year old who is being evaluated via a billable video visit.      Patient completed E-Check in. Questionnaires blown in and patient checked in without being called.     How would you like to obtain your AVS? MyChart  If the video visit is dropped, the invitation should be resent by: Text to cell phone: .  Will anyone else be joining your video visit? No      Video Start Time: 1:15 PM    Assessment & Plan     Alopecia  Patient with some hair loss that is consistent with telogen effluvium.  She has noticed this hair loss over the last 3 months and now has noticed that slowing down as well as some potential new growth coming in at the temples.  She was on Xarelto for DVT and had just stopped the Xarelto when this began.  She has not noticed any scalp changes.  She has not noticed any other significant health issues.  She has lost some weight due to some changes in how she is eating.  Recommend evaluation with CBC, CMP TSH and vitamin D as well as ferritin.  We will evaluate if any of these are contributing to the hair loss.  Discussed potentially trying minoxidil over-the-counter to help with the hair of growth.  Patient is encouraged that she is having some regrowth at this point and the loss is slowing down.  Will notify results and determine neck steps from there.  Patient is scheduled for physical in August.  - CBC with platelets and differential; Future  - Comprehensive metabolic panel (BMP + Alb, Alk Phos, ALT, AST, Total. Bili, TP); Future  - TSH with free T4 reflex; Future  - Ferritin; Future  - Vitamin D Deficiency; Future             BMI:   Estimated body mass index is 32.8 kg/m  as calculated from the following:    Height as of 10/22/21: 1.76 m (5' 9.29\").    Weight as of 12/6/21: 101.6 kg (224 lb).   Weight management plan: Discussed healthy diet and exercise guidelines        Return in about 2 months (around 8/8/2022) for Routine preventive.    Sabrina Hdez MD  [facility] " CYNDIE Mejia is a 36 year old who presents for the following health issues     History of Present Illness       Reason for visit:  To talk about recent thinning of my hair.  Symptom onset:  More than a month  Symptoms include:  My hair has gotten thinner over the past few months, not in clumps or patches but fairly evenly distributed but more noticeable on the hairline and temples.  Symptom intensity:  Mild  Symptom progression:  Staying the same  Had these symptoms before:  No    She eats 2-3 servings of fruits and vegetables daily.She consumes 0 sweetened beverage(s) daily.She exercises with enough effort to increase her heart rate 30 to 60 minutes per day.  She exercises with enough effort to increase her heart rate 3 or less days per week.   She is taking medications regularly.     Started about 3 months ago.   Loss has seemed to slow down.   Noticed with eyebrows and temple areas.     Was taking Xarelto - started a month or 2 after stopping.   Starting dieting November. 25-30 pounds of loss.   Has increased meal planning.     Was not ill before this.     No patchy loss.     No family history of hair loss in female family members.     No changes with her scalp.     Had facial hair in the past. No recent changes.     Had heavy periods with Xarelto but none further.         Review of Systems   CONSTITUTIONAL: NEGATIVE for fever, chills, change in weight  ENT/MOUTH: NEGATIVE for ear, mouth and throat problems  RESP: NEGATIVE for significant cough or SOB  CV: NEGATIVE for chest pain, palpitations or peripheral edema      Objective           Vitals:  No vitals were obtained today due to virtual visit.    Physical Exam   GENERAL: Healthy, alert and no distress  EYES: Eyes grossly normal to inspection.  No discharge or erythema, or obvious scleral/conjunctival abnormalities.  RESP: No audible wheeze, cough, or visible cyanosis.  No visible retractions or increased work of breathing.    SKIN: Visible skin  clear. No significant rash, abnormal pigmentation or lesions.  NEURO: Cranial nerves grossly intact.  Mentation and speech appropriate for age.  PSYCH: Mentation appears normal, affect normal/bright, judgement and insight intact, normal speech and appearance well-groomed.                Video-Visit Details    Type of service:  Video Visit    Video End Time:1:29 PM    Originating Location (pt. Location): Home    Distant Location (provider location):  [facility]     Platform used for Video Visit: [employer]

## 2022-06-08 ENCOUNTER — VIRTUAL VISIT (OUTPATIENT)
Dept: FAMILY MEDICINE | Facility: CLINIC | Age: 37
End: 2022-06-08
Payer: COMMERCIAL

## 2022-06-08 DIAGNOSIS — L65.9 ALOPECIA: Primary | ICD-10-CM

## 2022-06-08 PROCEDURE — 99213 OFFICE O/P EST LOW 20 MIN: CPT | Mod: 95 | Performed by: FAMILY MEDICINE

## 2022-06-17 ENCOUNTER — LAB (OUTPATIENT)
Dept: LAB | Facility: CLINIC | Age: 37
End: 2022-06-17
Payer: COMMERCIAL

## 2022-06-17 DIAGNOSIS — L65.9 ALOPECIA: ICD-10-CM

## 2022-06-17 LAB
ALBUMIN SERPL-MCNC: 3.6 G/DL (ref 3.4–5)
ALP SERPL-CCNC: 64 U/L (ref 40–150)
ALT SERPL W P-5'-P-CCNC: 25 U/L (ref 0–50)
ANION GAP SERPL CALCULATED.3IONS-SCNC: 3 MMOL/L (ref 3–14)
AST SERPL W P-5'-P-CCNC: 16 U/L (ref 0–45)
BASOPHILS # BLD AUTO: 0 10E3/UL (ref 0–0.2)
BASOPHILS NFR BLD AUTO: 0 %
BILIRUB SERPL-MCNC: 1 MG/DL (ref 0.2–1.3)
BUN SERPL-MCNC: 13 MG/DL (ref 7–30)
CALCIUM SERPL-MCNC: 8.4 MG/DL (ref 8.5–10.1)
CHLORIDE BLD-SCNC: 106 MMOL/L (ref 94–109)
CO2 SERPL-SCNC: 28 MMOL/L (ref 20–32)
CREAT SERPL-MCNC: 0.8 MG/DL (ref 0.52–1.04)
DEPRECATED CALCIDIOL+CALCIFEROL SERPL-MC: 17 UG/L (ref 20–75)
EOSINOPHIL # BLD AUTO: 0.2 10E3/UL (ref 0–0.7)
EOSINOPHIL NFR BLD AUTO: 3 %
ERYTHROCYTE [DISTWIDTH] IN BLOOD BY AUTOMATED COUNT: 12.4 % (ref 10–15)
FERRITIN SERPL-MCNC: 64 NG/ML (ref 12–150)
GFR SERPL CREATININE-BSD FRML MDRD: >90 ML/MIN/1.73M2
GLUCOSE BLD-MCNC: 96 MG/DL (ref 70–99)
HCT VFR BLD AUTO: 42.4 % (ref 35–47)
HGB BLD-MCNC: 14.3 G/DL (ref 11.7–15.7)
IMM GRANULOCYTES # BLD: 0 10E3/UL
IMM GRANULOCYTES NFR BLD: 0 %
LYMPHOCYTES # BLD AUTO: 1.5 10E3/UL (ref 0.8–5.3)
LYMPHOCYTES NFR BLD AUTO: 28 %
MCH RBC QN AUTO: 29.9 PG (ref 26.5–33)
MCHC RBC AUTO-ENTMCNC: 33.7 G/DL (ref 31.5–36.5)
MCV RBC AUTO: 89 FL (ref 78–100)
MONOCYTES # BLD AUTO: 0.5 10E3/UL (ref 0–1.3)
MONOCYTES NFR BLD AUTO: 9 %
NEUTROPHILS # BLD AUTO: 3.3 10E3/UL (ref 1.6–8.3)
NEUTROPHILS NFR BLD AUTO: 60 %
PLATELET # BLD AUTO: 252 10E3/UL (ref 150–450)
POTASSIUM BLD-SCNC: 4.2 MMOL/L (ref 3.4–5.3)
PROT SERPL-MCNC: 7.1 G/DL (ref 6.8–8.8)
RBC # BLD AUTO: 4.78 10E6/UL (ref 3.8–5.2)
SODIUM SERPL-SCNC: 137 MMOL/L (ref 133–144)
TSH SERPL DL<=0.005 MIU/L-ACNC: 3.66 MU/L (ref 0.4–4)
WBC # BLD AUTO: 5.5 10E3/UL (ref 4–11)

## 2022-06-17 PROCEDURE — 82728 ASSAY OF FERRITIN: CPT

## 2022-06-17 PROCEDURE — 80050 GENERAL HEALTH PANEL: CPT

## 2022-06-17 PROCEDURE — 82306 VITAMIN D 25 HYDROXY: CPT

## 2022-06-17 PROCEDURE — 36415 COLL VENOUS BLD VENIPUNCTURE: CPT

## 2022-06-21 DIAGNOSIS — E55.9 VITAMIN D DEFICIENCY: Primary | ICD-10-CM

## 2022-08-01 ASSESSMENT — ENCOUNTER SYMPTOMS
FEVER: 0
HEADACHES: 0
CHILLS: 0
ARTHRALGIAS: 0
DIZZINESS: 0
WEAKNESS: 0
HEMATOCHEZIA: 0
PALPITATIONS: 0
SHORTNESS OF BREATH: 0
NAUSEA: 0
COUGH: 0
EYE PAIN: 0
HEARTBURN: 0
ABDOMINAL PAIN: 0
MYALGIAS: 0
SORE THROAT: 0
DYSURIA: 0
HEMATURIA: 0
BREAST MASS: 0
CONSTIPATION: 0
JOINT SWELLING: 0
PARESTHESIAS: 0
NERVOUS/ANXIOUS: 0
FREQUENCY: 0
DIARRHEA: 0

## 2022-08-02 ENCOUNTER — OFFICE VISIT (OUTPATIENT)
Dept: FAMILY MEDICINE | Facility: CLINIC | Age: 37
End: 2022-08-02
Payer: COMMERCIAL

## 2022-08-02 VITALS
OXYGEN SATURATION: 100 % | SYSTOLIC BLOOD PRESSURE: 122 MMHG | HEART RATE: 80 BPM | RESPIRATION RATE: 16 BRPM | DIASTOLIC BLOOD PRESSURE: 74 MMHG | BODY MASS INDEX: 31.77 KG/M2 | HEIGHT: 69 IN | TEMPERATURE: 98.4 F | WEIGHT: 214.5 LBS

## 2022-08-02 DIAGNOSIS — E03.9 HYPOTHYROIDISM, UNSPECIFIED TYPE: ICD-10-CM

## 2022-08-02 DIAGNOSIS — Z13.220 LIPID SCREENING: ICD-10-CM

## 2022-08-02 DIAGNOSIS — E03.4 HYPOTHYROIDISM DUE TO ACQUIRED ATROPHY OF THYROID: Chronic | ICD-10-CM

## 2022-08-02 DIAGNOSIS — E55.9 VITAMIN D DEFICIENCY: ICD-10-CM

## 2022-08-02 DIAGNOSIS — D68.51 FACTOR 5 LEIDEN MUTATION, HETEROZYGOUS (H): Chronic | ICD-10-CM

## 2022-08-02 DIAGNOSIS — Z12.4 CERVICAL CANCER SCREENING: ICD-10-CM

## 2022-08-02 DIAGNOSIS — Z00.00 ROUTINE HISTORY AND PHYSICAL EXAMINATION OF ADULT: Primary | ICD-10-CM

## 2022-08-02 LAB
CHOLEST SERPL-MCNC: 150 MG/DL
FASTING STATUS PATIENT QL REPORTED: YES
HDLC SERPL-MCNC: 70 MG/DL
LDLC SERPL CALC-MCNC: 68 MG/DL
NONHDLC SERPL-MCNC: 80 MG/DL
TRIGL SERPL-MCNC: 59 MG/DL

## 2022-08-02 PROCEDURE — 90471 IMMUNIZATION ADMIN: CPT | Performed by: FAMILY MEDICINE

## 2022-08-02 PROCEDURE — 90715 TDAP VACCINE 7 YRS/> IM: CPT | Performed by: FAMILY MEDICINE

## 2022-08-02 PROCEDURE — 87624 HPV HI-RISK TYP POOLED RSLT: CPT | Performed by: FAMILY MEDICINE

## 2022-08-02 PROCEDURE — 36415 COLL VENOUS BLD VENIPUNCTURE: CPT | Performed by: FAMILY MEDICINE

## 2022-08-02 PROCEDURE — 82306 VITAMIN D 25 HYDROXY: CPT | Performed by: FAMILY MEDICINE

## 2022-08-02 PROCEDURE — G0145 SCR C/V CYTO,THINLAYER,RESCR: HCPCS | Performed by: FAMILY MEDICINE

## 2022-08-02 PROCEDURE — 99395 PREV VISIT EST AGE 18-39: CPT | Mod: 25 | Performed by: FAMILY MEDICINE

## 2022-08-02 PROCEDURE — 80061 LIPID PANEL: CPT | Performed by: FAMILY MEDICINE

## 2022-08-02 RX ORDER — LEVOTHYROXINE SODIUM 75 UG/1
TABLET ORAL
Qty: 90 TABLET | Refills: 2 | Status: SHIPPED | OUTPATIENT
Start: 2022-08-02 | End: 2023-05-31

## 2022-08-02 ASSESSMENT — ENCOUNTER SYMPTOMS
ABDOMINAL PAIN: 0
JOINT SWELLING: 0
SHORTNESS OF BREATH: 0
DIZZINESS: 0
DYSURIA: 0
HEMATOCHEZIA: 0
PARESTHESIAS: 0
ARTHRALGIAS: 0
COUGH: 0
DIARRHEA: 0
CHILLS: 0
MYALGIAS: 0
CONSTIPATION: 0
NERVOUS/ANXIOUS: 0
HEMATURIA: 0
FREQUENCY: 0
HEARTBURN: 0
EYE PAIN: 0
PALPITATIONS: 0
SORE THROAT: 0
BREAST MASS: 0
WEAKNESS: 0
HEADACHES: 0
NAUSEA: 0
FEVER: 0

## 2022-08-02 ASSESSMENT — PAIN SCALES - GENERAL: PAINLEVEL: NO PAIN (0)

## 2022-08-02 NOTE — PROGRESS NOTES
SUBJECTIVE:   CC: Jackie Juan is an 36 year old woman who presents for preventive health visit.       Patient has been advised of split billing requirements and indicates understanding: Yes  Healthy Habits:     Getting at least 3 servings of Calcium per day:  Yes    Bi-annual eye exam:  Yes    Dental care twice a year:  Yes    Sleep apnea or symptoms of sleep apnea:  None    Diet:  Breakfast skipped    Frequency of exercise:  2-3 days/week    Duration of exercise:  15-30 minutes    Taking medications regularly:  Yes    Medication side effects:  None    PHQ-2 Total Score: 0    Additional concerns today:  No          Hypothyroidism Follow-up      Since last visit, patient describes the following symptoms: Weight stable, no hair loss, no skin changes, no constipation, no loose stools      Today's PHQ-2 Score:   PHQ-2 (  [employer]) 2022   Q1: Little interest or pleasure in doing things 0   Q2: Feeling down, depressed or hopeless 0   PHQ-2 Score 0   PHQ-2 Total Score (12-17 Years)- Positive if 3 or more points; Administer PHQ-A if positive -   Q1: Little interest or pleasure in doing things Not at all   Q2: Feeling down, depressed or hopeless Not at all   PHQ-2 Score 0       Abuse: Current or Past (Physical, Sexual or Emotional) - No  Do you feel safe in your environment? Yes        Social History     Tobacco Use     Smoking status: Former Smoker     Types: Cigarettes     Start date: 2011     Quit date: 2020     Years since quittin.9     Smokeless tobacco: Never Used   Substance Use Topics     Alcohol use: Yes     Comment: occasional- 2-3 on weekends      If you drink alcohol do you typically have >3 drinks per day or >7 drinks per week? No    Alcohol Use 2022   Prescreen: >3 drinks/day or >7 drinks/week? No   Prescreen: >3 drinks/day or >7 drinks/week? -       Reviewed orders with patient.  Reviewed health maintenance and updated orders accordingly - Yes  BP Readings from Last 3 Encounters:    08/02/22 122/74   10/22/21 124/66   08/31/21 120/76    Wt Readings from Last 3 Encounters:   08/02/22 97.3 kg (214 lb 8 oz)   12/06/21 101.6 kg (224 lb)   10/22/21 106.1 kg (234 lb)                    Breast Cancer Screening:    FHS-7:   Breast CA Risk Assessment (FHS-7) 10/22/2021 8/1/2022   Did any of your first-degree relatives have breast or ovarian cancer? No No   Did any of your relatives have bilateral breast cancer? Unknown Unknown   Did any man in your family have breast cancer? No No   Did any woman in your family have breast and ovarian cancer? No Yes   Did any woman in your family have breast cancer before age 50 y? No No   Do you have 2 or more relatives with breast and/or ovarian cancer? No No   Do you have 2 or more relatives with breast and/or bowel cancer? No No     Factor V Leiden  Patient has had history of DVT.  She was on Xarelto due to that.  Now she is on as needed Xarelto prior to long distance travel.  She has had to take this only once.    Cold sores  Patient reports no recent issues.  No concerns.    Vitamin D deficiency  Currently taking the vitamin D prescription.  We will recheck today.    Patient under 40 years of age: Routine Mammogram Screening not recommended.   Pertinent mammograms are reviewed under the imaging tab.    History of abnormal Pap smear: NO - age 30-65 PAP every 5 years with negative HPV co-testing recommended  PAP / HPV Latest Ref Rng & Units 5/10/2017 2/11/2014   PAP (Historical) - NIL NIL   HPV16 NEG Negative -   HPV18 NEG Negative -   HRHPV NEG Negative -     Reviewed and updated as needed this visit by clinical staff                    Reviewed and updated as needed this visit by Provider                       Review of Systems   Constitutional: Negative for chills and fever.   HENT: Negative for congestion, ear pain, hearing loss and sore throat.    Eyes: Negative for pain and visual disturbance.   Respiratory: Negative for cough and shortness of breath.   "  Cardiovascular: Negative for chest pain, palpitations and peripheral edema.   Gastrointestinal: Negative for abdominal pain, constipation, diarrhea, heartburn, hematochezia and nausea.   Breasts:  Negative for tenderness, breast mass and discharge.   Genitourinary: Negative for dysuria, frequency, genital sores, hematuria, pelvic pain, urgency, vaginal bleeding and vaginal discharge.   Musculoskeletal: Negative for arthralgias, joint swelling and myalgias.   Skin: Negative for rash.   Neurological: Negative for dizziness, weakness, headaches and paresthesias.   Psychiatric/Behavioral: Negative for mood changes. The patient is not nervous/anxious.           OBJECTIVE:   /74   Pulse 80   Temp 98.4  F (36.9  C) (Temporal)   Resp 16   Ht 1.755 m (5' 9.09\")   Wt 97.3 kg (214 lb 8 oz)   LMP 07/25/2022 (Exact Date)   SpO2 100%   BMI 31.59 kg/m    Physical Exam  GENERAL: healthy, alert and no distress  EYES: Eyes grossly normal to inspection, PERRL and conjunctivae and sclerae normal  HENT: ear canals and TM's normal, nose and mouth without ulcers or lesions  NECK: no adenopathy, no asymmetry, masses, or scars and thyroid normal to palpation  RESP: lungs clear to auscultation - no rales, rhonchi or wheezes  BREAST: normal without masses, tenderness or nipple discharge and no palpable axillary masses or adenopathy  CV: regular rate and rhythm, normal S1 S2, no S3 or S4, no murmur, click or rub, no peripheral edema and peripheral pulses strong  ABDOMEN: soft, nontender, no hepatosplenomegaly, no masses and bowel sounds normal   (female): normal female external genitalia, normal urethral meatus, vaginal mucosa pink, moist, well rugated, and normal cervix/adnexa/uterus without masses or discharge  MS: no gross musculoskeletal defects noted, no edema  SKIN: no suspicious lesions or rashes  NEURO: Normal strength and tone, mentation intact and speech normal  PSYCH: mentation appears normal, affect " "normal/bright    Diagnostic Test Results:  Labs reviewed in Epic    ASSESSMENT/PLAN:   (Z00.00) Routine history and physical examination of adult  (primary encounter diagnosis)  Comment: See counseling messages  Plan: Recheck in 1 year    (E55.9) Vitamin D deficiency  Comment: Currently taking her vitamin D replacement.  She has been out in the sun a lot.  She has had improvement of hair loss.  Plan: Vitamin D Deficiency        Recheck today.    (Z12.4) Cervical cancer screening  Comment: Due  Plan: Pap Screen with HPV - recommended age 30 - 65         years        Done    (Z13.220) Lipid screening  Comment: Recommend.  Fasting today.  Plan: Lipid panel reflex to direct LDL Fasting        Will notify results.    (E03.4) Hypothyroidism due to acquired atrophy of thyroid  Comment: Not due for recheck today of labs.  She is doing fine.  Plan: Due for recheck of labs next summer.    (D68.51) Factor 5 Leiden mutation, heterozygous (H)  Comment: Under the care of hematology.  Uses as needed Xarelto for long travel.  Plan: No change in plan    Patient has been advised of split billing requirements and indicates understanding: Yes    COUNSELING:  Reviewed preventive health counseling, as reflected in patient instructions       Regular exercise       Healthy diet/nutrition    Estimated body mass index is 32.8 kg/m  as calculated from the following:    Height as of 10/22/21: 1.76 m (5' 9.29\").    Weight as of 12/6/21: 101.6 kg (224 lb).    Weight management plan: Discussed healthy diet and exercise guidelines    She reports that she quit smoking about 23 months ago. Her smoking use included cigarettes. She started smoking about 10 years ago. She has never used smokeless tobacco.      Counseling Resources:  ATP IV Guidelines  Pooled Cohorts Equation Calculator  Breast Cancer Risk Calculator  BRCA-Related Cancer Risk Assessment: FHS-7 Tool  FRAX Risk Assessment  ICSI Preventive Guidelines  Dietary Guidelines for Americans, " 2010  USDA's MyPlate  ASA Prophylaxis  Lung CA Screening    Sabrina Hdez MD  [facility]

## 2022-08-02 NOTE — NURSING NOTE
Prior to immunization administration, verified patients identity using patient s name and date of birth. Please see Immunization Activity for additional information.     Screening Questionnaire for Adult Immunization    Are you sick today?   No   Do you have allergies to medications, food, a vaccine component or latex?   Yes   Have you ever had a serious reaction after receiving a vaccination?   No   Do you have a long-term health problem with heart, lung, kidney, or metabolic disease (e.g., diabetes), asthma, a blood disorder, no spleen, complement component deficiency, a cochlear implant, or a spinal fluid leak?  Are you on long-term aspirin therapy?   No   Do you have cancer, leukemia, HIV/AIDS, or any other immune system problem?   No   Do you have a parent, brother, or sister with an immune system problem?   No   In the past 3 months, have you taken medications that affect  your immune system, such as prednisone, other steroids, or anticancer drugs; drugs for the treatment of rheumatoid arthritis, Crohn s disease, or psoriasis; or have you had radiation treatments?   No   Have you had a seizure, or a brain or other nervous system problem?   No   During the past year, have you received a transfusion of blood or blood    products, or been given immune (gamma) globulin or antiviral drug?   No   For women: Are you pregnant or is there a chance you could become       pregnant during the next month?   No   Have you received any vaccinations in the past 4 weeks?   No     Immunization questionnaire answers were all negative.        Per orders of Dr. Hdez, injection of Tdap given by Armin Jones MA. Patient instructed to remain in clinic for 15 minutes afterwards, and to report any adverse reaction to me immediately.       Screening performed by Armin Jones MA on 8/2/2022 at 7:36 AM.

## 2022-08-03 LAB — DEPRECATED CALCIDIOL+CALCIFEROL SERPL-MC: 46 UG/L (ref 20–75)

## 2022-08-04 ENCOUNTER — MYC MEDICAL ADVICE (OUTPATIENT)
Dept: FAMILY MEDICINE | Facility: CLINIC | Age: 37
End: 2022-08-04

## 2022-08-04 LAB
BKR LAB AP GYN ADEQUACY: NORMAL
BKR LAB AP GYN INTERPRETATION: NORMAL
BKR LAB AP HPV REFLEX: NORMAL
BKR LAB AP PREVIOUS ABNORMAL: NORMAL
PATH REPORT.COMMENTS IMP SPEC: NORMAL
PATH REPORT.COMMENTS IMP SPEC: NORMAL
PATH REPORT.RELEVANT HX SPEC: NORMAL

## 2022-08-08 LAB
HUMAN PAPILLOMA VIRUS 16 DNA: NEGATIVE
HUMAN PAPILLOMA VIRUS 18 DNA: NEGATIVE
HUMAN PAPILLOMA VIRUS FINAL DIAGNOSIS: NORMAL
HUMAN PAPILLOMA VIRUS OTHER HR: NEGATIVE

## 2022-09-10 ENCOUNTER — HEALTH MAINTENANCE LETTER (OUTPATIENT)
Age: 37
End: 2022-09-10

## 2022-11-22 ENCOUNTER — VIRTUAL VISIT (OUTPATIENT)
Dept: URGENT CARE | Facility: CLINIC | Age: 37
End: 2022-11-22
Payer: COMMERCIAL

## 2022-11-22 ENCOUNTER — MYC MEDICAL ADVICE (OUTPATIENT)
Dept: FAMILY MEDICINE | Facility: CLINIC | Age: 37
End: 2022-11-22

## 2022-11-22 DIAGNOSIS — M79.661 PAIN OF RIGHT LOWER LEG: Primary | ICD-10-CM

## 2022-11-22 PROCEDURE — 99214 OFFICE O/P EST MOD 30 MIN: CPT | Mod: 95 | Performed by: EMERGENCY MEDICINE

## 2022-11-22 NOTE — PROGRESS NOTES
Video visit:  Start time: 2:32 PM  Stop time: 2:39 PM  Time: 7 minutes  Patient location: Home  Provider location: [facility] virtual provider (remote).  Platform used for video visit: [place]      CHIEF COMPLAINT: Possible blood clot right leg      HPI: Patient is a 37-year-old female with a history of factor V Leiden mutation and previous DVT of right leg noted the onset of pain in the right leg 2 days ago.  She indicates the pain to be the popliteal area in the proximal calf region.  Is been no swelling of the leg.  She feels location of the symptoms are different than the previous DVT.  She was treated for 6 months with Xarelto but is no longer taking it at this time.  She notes there was some outdoor work on the weekend but no jefferson injury to explain her symptoms.      ROS: See HPI otherwise normal.    Allergies   Allergen Reactions     Shellfish Allergy       Current Outpatient Medications   Medication Sig Dispense Refill     acyclovir (ZOVIRAX) 400 MG tablet TAKE 1 TABLET(400 MG) BY MOUTH TWICE DAILY 30 tablet 1     levothyroxine (SYNTHROID/LEVOTHROID) 75 MCG tablet TAKE 1 TABLET(75 MCG) BY MOUTH DAILY 90 tablet 2     rivaroxaban ANTICOAGULANT (XARELTO) 10 MG TABS tablet Take one tablet PO Q 24 hours as needed just prior to long distance car or flight travel that is >4 hours. 30 tablet 1     vitamin D3 (CHOLECALCIFEROL) 1.25 MG (81510 UT) capsule Take 1 capsule (50,000 Units) by mouth every 7 days 8 capsule 0         PE: No acute distress on video visit.  With directing of the camera she is able to show me that her symptoms are the localized right popliteal and proximal calf region.  Otherwise exam limited.        TREATMENT: None.      ASSESSMENT: New onset leg pain the patient with a factor V Leiden mutation at risk for DVT.  No explainable mechanism for leg pain.  Location of the symptoms is not highly suggestive of DVT but feel patient needs to be evaluated.      DIAGNOSIS: Right leg pain.      PLAN:  I have asked patient to put in an E-visit message to her PCP to order an ultrasound of her right leg.  I have also instructed patient to restart Xarelto 10 mg twice daily until her ultrasound is performed with further direction of anticoagulation medication by her PCP depending on the results of the ultrasound.

## 2022-11-22 NOTE — TELEPHONE ENCOUNTER
SITUATION:   Patient sent Erydel and had e-visit on 11/22/22. Please review encounter and [place] message (below).     I just completed a virtual urgent care visit with Dr. Blue regarding pain in my right calf. He has recommended that I message in and request an ultrasound appointment to be certain that it is not a blood clot, though he thought it was unlikely. He also recommended that I go ahead and take the 10mg xarelto that I have for flights/long trips twice a day until I have the ultrasound to rule out that it is a clot.   Could I please get an ultrasound appointment scheduled?    BACKGROUND:   She indicates the pain to be the popliteal area in the proximal calf region.  Is been no swelling of the leg.  She feels location of the symptoms are different than the previous DVT.  She was treated for 6 months with Xarelto but is no longer taking it at this time.  She notes there was some outdoor work on the weekend but no jefferson injury to explain her symptoms.    HEALTH HISTORY:  HX of factor V Leiden mutation and previous DVT right leg.      PATIENT REQUEST:   US order to rule out a blood clot. (Noted by provided in  visit note on 11/22/22)    BLACK CastanedaN, RN, PHN  [facility]  November 22, 2022

## 2022-11-23 ENCOUNTER — OFFICE VISIT (OUTPATIENT)
Dept: PEDIATRICS | Facility: CLINIC | Age: 37
End: 2022-11-23
Attending: PHYSICIAN ASSISTANT
Payer: COMMERCIAL

## 2022-11-23 ENCOUNTER — ANCILLARY PROCEDURE (OUTPATIENT)
Dept: ULTRASOUND IMAGING | Facility: CLINIC | Age: 37
End: 2022-11-23
Attending: NURSE PRACTITIONER
Payer: COMMERCIAL

## 2022-11-23 VITALS
DIASTOLIC BLOOD PRESSURE: 88 MMHG | OXYGEN SATURATION: 99 % | SYSTOLIC BLOOD PRESSURE: 142 MMHG | HEART RATE: 88 BPM | TEMPERATURE: 97.7 F | RESPIRATION RATE: 18 BRPM

## 2022-11-23 DIAGNOSIS — I82.461 ACUTE DEEP VEIN THROMBOSIS (DVT) OF CALF MUSCLE VEIN OF RIGHT LOWER EXTREMITY (H): Primary | ICD-10-CM

## 2022-11-23 DIAGNOSIS — M79.661 RIGHT CALF PAIN: ICD-10-CM

## 2022-11-23 PROCEDURE — 99215 OFFICE O/P EST HI 40 MIN: CPT | Performed by: NURSE PRACTITIONER

## 2022-11-23 PROCEDURE — 93971 EXTREMITY STUDY: CPT | Mod: RT | Performed by: RADIOLOGY

## 2022-11-23 ASSESSMENT — PAIN SCALES - GENERAL: PAINLEVEL: SEVERE PAIN (7)

## 2022-11-23 NOTE — PATIENT INSTRUCTIONS
Start Xarelto starter pack.    If you develop any signs of bleeding please follow-up with primary care provider.    If you develop any shortness of breath or chest pain or cough please go to the emergency room for further assessment.

## 2022-11-23 NOTE — TELEPHONE ENCOUNTER
I do not know why this was not ordered by the physician who saw the patient in urgent care. This should be ordered by the physician who did the visit and recommended it, and not be coming to primary care for a patient we did not see.   PCP is out this week.     Kimberly Mckeon PA-C

## 2022-11-23 NOTE — PROGRESS NOTES
Chief Complaint   Patient presents with     Musculoskeletal Problem     Right leg pain         ICD-10-CM    1. Acute deep vein thrombosis (DVT) of calf muscle vein of right lower extremity (H)  I82.461 Rivaroxaban ANTICOAGULANT 15 & 20 MG TBPK Starter Therapy Pack      2. Right calf pain  M79.661 US Lower Extremity Venous Duplex Right      Will start Xarelto.  Discussed side effects of Xarelto and what to watch for..  Discussed possible complications of DVT and patient expressed understanding.  She will make follow-up appointment with primary care provider in a couple of weeks to discuss long-term management.    44 minutes spent on the date of the encounter doing chart review, history and exam, documentation and further activities per the note      US Lower Extremity Venous Duplex Right    Result Date: 11/23/2022  EXAMINATION: US LOWER EXTREMITY VENOUS DUPLEX RIGHT, 11/23/2022 11:31 AM COMPARISON: Lower extremity ultrasound 3/21/2022 HISTORY: Right calf pain TECHNIQUE:  Gray-scale evaluation with compression, spectral flow, and color Doppler assessment of the deep venous system of the right leg from groin to knee, and then at the ankle. FINDINGS: In the right lower extremity, the common femoral, superficial femoral, popliteal and posterior tibial veins demonstrate normal compressibility and blood flow.  There is normal compressibility, phasicity, and augmentation in the left common femoral vein. However, in the area of pain in the proximal right calf, there is occlusive thrombus within one of the gastrocnemius vein.  The thrombus begins about 2 cm from the junction with the popliteal vein. The thrombus is about 4.3 cm long.     IMPRESSION: 1.  Occlusive thrombus in the right gastrocnemius vein. AN MD SHAYNE   SYSTEM ID:  [id number]    No results found for this or any previous visit (from the past 24 hour(s)).    Subjective   Jackie is a 37 year old accompanied by her spouse, presenting for the following health  issues: right calf pain.  She does have a history of previous DVT and was on Xarelto for 3 months.  She had some leftover Xarelto at home so she did take 2 doses 1 yesterday and 1 today of 10 mg.      HPI     Pain History:  When did you first notice your pain? - Acute Pain   Have you seen anyone else for your pain? No  Where in your body do you have pain? Musculoskeletal problem/pain  Onset/Duration: Monday morning  Description  Location: leg - right  Joint Swelling: No  Redness: No  Pain: YES  Warmth: No  Intensity:  moderate  Progression of Symptoms:  same  Accompanying signs and symptoms:   Fevers: No  Numbness/tingling/weakness: No  History  Trauma to the area: moving a big bookcase on Saturday and pushed with her right leg/foot  Recent illness:  No  Previous similar problem: YES- last august 2021  Previous evaluation:  YES  Precipitating or alleviating factors:  Aggravating factors include: walking, climbing stairs and overuse  Therapies tried and outcome: rest/inactivity  Physical Exam         GENERAL APPEARANCE: healthy appearing, alert     RESP: lungs clear to auscultation - no rales, rhonchi or wheezes     CV: regular rates and rhythm, no murmurs, rubs, or gallop     MS: extremities normal- no gross deformities noted; normal muscle tone, slight tenderness of the right upper calf on the medial side, no edema or erythema.     SKIN: no suspicious lesions or rashes     NEURO: Normal strength and tone, mentation intact and speech normal     PSYCH: normal thought process; no significant mood disturbance    Patient Instructions   Start Xarelto starter pack.    If you develop any signs of bleeding please follow-up with primary care provider.    If you develop any shortness of breath or chest pain or cough please go to the emergency room for further assessment.      Tammi Barfield APRN, CNP  [facility] Provider    The use of Dragon/[employer] dictation services may have been used to construct the content in  this note; any grammatical or spelling errors are non-intentional. Please contact the author of this note directly if you are in need of any clarification.

## 2022-11-23 NOTE — TELEPHONE ENCOUNTER
I called [facility]- they will take pt for US. They will reach out this morning to set up.   Kimberly Mckeon PA-C

## 2022-12-21 ENCOUNTER — OFFICE VISIT (OUTPATIENT)
Dept: FAMILY MEDICINE | Facility: CLINIC | Age: 37
End: 2022-12-21
Payer: COMMERCIAL

## 2022-12-21 VITALS
HEIGHT: 69 IN | HEART RATE: 82 BPM | TEMPERATURE: 98.8 F | WEIGHT: 222 LBS | BODY MASS INDEX: 32.88 KG/M2 | RESPIRATION RATE: 16 BRPM | OXYGEN SATURATION: 100 % | DIASTOLIC BLOOD PRESSURE: 80 MMHG | SYSTOLIC BLOOD PRESSURE: 130 MMHG

## 2022-12-21 DIAGNOSIS — D68.51 FACTOR 5 LEIDEN MUTATION, HETEROZYGOUS (H): Chronic | ICD-10-CM

## 2022-12-21 DIAGNOSIS — I82.461 ACUTE DEEP VEIN THROMBOSIS (DVT) OF CALF MUSCLE VEIN OF RIGHT LOWER EXTREMITY (H): Primary | ICD-10-CM

## 2022-12-21 PROCEDURE — 99214 OFFICE O/P EST MOD 30 MIN: CPT | Performed by: FAMILY MEDICINE

## 2022-12-21 ASSESSMENT — PAIN SCALES - GENERAL: PAINLEVEL: NO PAIN (0)

## 2022-12-21 NOTE — PROGRESS NOTES
Assessment & Plan     Acute deep vein thrombosis (DVT) of calf muscle vein of right lower extremity (H)  Factor 5 Leiden mutation, heterozygous (H)  Patient with second unprovoked right lower extremity DVT.  She has known factor V Leiden mutation.  She has previously been evaluated with hematology after the first unprovoked right lower extremity DVT and a recommendation was to consider remaining on Xarelto.  Patient did not end up deciding to stay on the Xarelto and stopped that after taking two 3-month courses of Xarelto.  At this time with the second unprovoked DVT she is more willing to remain on Xarelto.  For now she needs to remain on the Xarelto 20 mg daily for at least 3 months total.  Refill sent.  Will recheck lower extremity Doppler approximately 3 months from the onset of her symptoms to ensure resolution.  She seems to have had much improvement in her symptoms and noted findings of swelling or tenderness on exam.  Will reach out to hematology to verify plan.  Patient will recheck here as needed for any worsening or concerns.  - rivaroxaban ANTICOAGULANT (XARELTO) 20 MG TABS tablet; Take 1 tablet (20 mg) by mouth daily (with dinner)  - US Lower Extremity Venous Duplex Right; Future               MED REC REQUIRED  Post Medication Reconciliation Status: discharge medications reconciled and changed, per note/orders      No follow-ups on file.   Follow-up Visit   Expected date: Aug 28, 2023      Follow Up Appointment Details:     Follow-up with whom?: Me    Follow-Up for what?: Adult Preventive    How?: In Person                    Sabrina Hdez MD  [facility] CYNDIE Mejia is a 37 year old, presenting for the following health issues:  Deep Vein Thrombosis      HPI     ED/UC Followup:    Facility:  [facility]  Date of visit: 11/23/2022  Reason for visit: DVT  Current Status: improving on Xarelto    Diagnosed on 11/23/22 with clot right lower  "extremity. Had 1-2 days.   Had traveled in October had taken Xarelto then 10 mg daily.   Had noted no pain or swelling after travel.  She has no pain now.   She denies any chest pain or shortness of breath.  She has had no side effects or concerns with taking Xarelto.    Patient had a prior clot beginning of this year and was evaluated with hematology.  She has known factor V Leiden mutation.  Her last clot was also on the right lower extremity and was unprovoked.  The recommendation at that time was to continue on the Xarelto.  There was an option to stop after she completed her two 3-month courses of Xarelto and had decided at that time to stop it.    At this time she is considering what her options are and is more willing to remain on the Xarelto.        Review of Systems   CONSTITUTIONAL: NEGATIVE for fever, chills, change in weight  ENT/MOUTH: NEGATIVE for ear, mouth and throat problems  RESP: NEGATIVE for significant cough or SOB  CV: NEGATIVE for chest pain, palpitations or peripheral edema      Objective    /80 (BP Location: Left arm, Patient Position: Chair, Cuff Size: Adult Large)   Pulse 82   Temp 98.8  F (37.1  C) (Temporal)   Resp 16   Ht 1.759 m (5' 9.25\")   Wt 100.7 kg (222 lb)   LMP 11/21/2022 (Approximate)   SpO2 100%   Breastfeeding No   BMI 32.55 kg/m    Body mass index is 32.55 kg/m .  Physical Exam   GENERAL: healthy, alert and no distress  NECK: no adenopathy, no asymmetry, masses, or scars and thyroid normal to palpation  RESP: lungs clear to auscultation - no rales, rhonchi or wheezes  CV: regular rate and rhythm, normal S1 S2, no S3 or S4, no murmur, click or rub, no peripheral edema and peripheral pulses strong  MS: Right lower extremity without any pain or swelling.  She has compression hose on today.    Reviewed lower extremity Doppler from November.                "

## 2023-01-11 NOTE — PROGRESS NOTES
[facility]  [facility]  [facility], [address], [place] [address]  Main: [phone], Fax: [phone]    Video Virtual Visit Note:    Patient: Jackie Juan  MRN: [id number]  : 1985  RAMANDEEP: 2023    Due to the ongoing COVID-19 outbreak, this visit was conducted by video, with the patient's approval.      Reason of today's visit:  Right distal lower extremity DVT on 2021.      Clinical History Summary:  Jackie is a 37 year old female with a family history of factor V Leiden mutation and DVT with her mother, who also is found to have heterozygous factor V Leiden mutation back in , developed a right distal lower extremity DVT on 2021 S/P close to 7 months of anticoagulation therapy, who recently is found to have a recurrent occlusive thrombus of the right gastrocnemius vein on 2022, referred by her primary care provider, Kimberly Mckeon PA-C for re-consultation. The patient was last seen by this writer back on 2021.     Thrombosis History Summary:    Mid 2021, she flew from [place] to [place] (which was about 1.5 hours flight).     Just a few weeks prior to her diagnosis of right leg DVT in 2021, she recalls that she was using a different setting for her cross-stitching hobby where she basically sit crossed legged with a 2x4 plank on her leg for 3-4 hours at a time.     Then on 2021, she presented to her primary care provider's office for evaluation of a 2 weeks history of intermittent pain over the right calf worse after prolonged ambulation. Ultrasound at the time showed: occlusive thrombus in one of the paired peroneal veins. For this reason, she was placed on rivaroxaban.     Jackie's mother has bilateral lower extremity DVT and bilateral pulmonary embolism back about 20 years ago when she was in her mid-40's. Her venous thromboembolic event was thought to be unprovoked. She was later found to  have factor V Leiden mutation and thus Jackie was tested for this mutation back in 2013 and is found to have heterozygous factor V Leiden mutation. Because of this finding, Jackie has not been on estrogen containing OCP.     12/6/2021, she established care with this writer for which I have determined that her distal right leg DVT in Aug 2021 was at best a minimally provoked event. At the time, I did discussed with her that we should consider keeping her indefinite anticoagulation therapy.     12/14/2021, repeat right leg venous ultrasound showed persistent thrombus in the right peroneal vein in the proximal / mid calf. Thrombus in the peroneal vein in the distal calf now appears to be resolved. I recommended continuation with rivaroxaban for another 3 months.     3/22/2022, repeat ultrasound showed no DVT demonstrated in the right lower extremity. Previously known right peroneal vein thrombus has resolved and the patient has elected to stop anticoagulation therapy at that point.      Interim History:  On 11/23/2022, she presented to her primary care provider's office with a 3 days complaint of right leg pain. Concern for recurrent DVT, an ultrasound was done which showed occlusive thrombus in the right gastrocnemius vein. She was restarted on rivaroxaban and is referred back to our clinic for consultation.     Currently, she is on rivaroxaban at 20 mg PO Qday dosing. She reports no preceding provoking factors leading up to her 11/23/2022 right leg DVT.     She reports that her right leg pain has completely resolved. She denies any bleeding issues. However, she does noticed that her menstrual bleeding has been heavier that usual after she has started back on rivaroxaban.     ROS:  Denies any bleeding complications. Specifically, no frequent epistaxis. No issues with oral mucosal bleeding. Denies any hematuria or blood in stools. Denies any shortness of breath. No chest pain. No cough. No fever.    Medications:   Current  Outpatient Medications   Medication     acyclovir (ZOVIRAX) 400 MG tablet     levothyroxine (SYNTHROID/LEVOTHROID) 75 MCG tablet     rivaroxaban ANTICOAGULANT (XARELTO) 20 MG TABS tablet     No current facility-administered medications for this visit.       Allergies:   Allergies   Allergen Reactions     Shellfish Allergy         PMH:   Past Medical History:   Diagnosis Date     Factor 5 Leiden mutation, heterozygous 02/11/2014     Factor 5 Leiden mutation, heterozygous (H)      Thyroid disease        Social History:   Deferred.    Family History:  As above.     Objective:  Visual Examination via Video:  Pleasant in no acute distress.  Normal work of breathing   A+O x 3    Labs:  Component      Latest Ref Rng & Units 6/17/2022   WBC      4.0 - 11.0 10e3/uL 5.5   RBC Count      3.80 - 5.20 10e6/uL 4.78   Hemoglobin      11.7 - 15.7 g/dL 14.3   Hematocrit      35.0 - 47.0 % 42.4   MCV      78 - 100 fL 89   MCH      26.5 - 33.0 pg 29.9   MCHC      31.5 - 36.5 g/dL 33.7   RDW      10.0 - 15.0 % 12.4   Platelet Count      150 - 450 10e3/uL 252   % Neutrophils      % 60   % Lymphocytes      % 28   % Monocytes      % 9   % Eosinophils      % 3   % Basophils      % 0   % Immature Granulocytes      % 0   Absolute Neutrophils      1.6 - 8.3 10e3/uL 3.3   Absolute Lymphocytes      0.8 - 5.3 10e3/uL 1.5   Absolute Monocytes      0.0 - 1.3 10e3/uL 0.5   Absolute Eosinophils      0.0 - 0.7 10e3/uL 0.2   Absolute Basophils      0.0 - 0.2 10e3/uL 0.0   Absolute Immature Granulocytes      <=0.4 10e3/uL 0.0   Sodium      133 - 144 mmol/L 137   Potassium      3.4 - 5.3 mmol/L 4.2   Chloride      94 - 109 mmol/L 106   Carbon Dioxide      20 - 32 mmol/L 28   Anion Gap      3 - 14 mmol/L 3   Urea Nitrogen      7 - 30 mg/dL 13   Creatinine      0.52 - 1.04 mg/dL 0.80   Calcium      8.5 - 10.1 mg/dL 8.4 (L)   Glucose      70 - 99 mg/dL 96   Alkaline Phosphatase      40 - 150 U/L 64   AST      0 - 45 U/L 16   ALT      0 - 50 U/L 25    Protein Total      6.8 - 8.8 g/dL 7.1   Albumin      3.4 - 5.0 g/dL 3.6   Bilirubin Total      0.2 - 1.3 mg/dL 1.0   GFR Estimate      >60 mL/min/1.73m2 >90   Ferritin      12 - 150 ng/mL 64     Component      Latest Ref Rng & Units 12/14/2021   Antithrombin III Chromogenic      85 - 135 % 95   Prot C Chromogenic      70 - 170 % 86   Protein S Antigen Free      55 - 125 % 62   Prothrombin Gene mutation Negative       Imaging:  Reviewed and are as described above.     Assessment:  In summary, Jackie is a 37 year old female with a family history of DVT/PE and factor V Leiden mutation, who is known to have heterozygous factor V Leiden mutation, who is found to have a distal right leg DVT back in 8/31/2021 S/P close to 7 months of anticoagulation therapy with rivaroxaban at the time, now with recurrent right distal lower extremity DVT on 11/23/2022, referred to our clinic for re-consultation. She participates in today's scheduled video visit for this consultation. Jackie's right distal lower extremity DVT in 8/31/2021 was an unprovoked venous thromboembolic event or may be minimally provoked by the fact that she was sitting cross legged doing cross stitches with a 2x4 plank on her leg for 3-4 hours at a time for the couple weeks prior to her diagnosis of DVT. She completed close to 7 months of anticoagulation therapy with rivaroxaban at the time.     Her 11/23/2022 recurrent right distal leg DVT is also an unprovoked event.      Diagnosis:  1. Right distal lower extremity DVT on 8/31/2021, likely unprovoked.  2. 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   3. Heterozygous factor V Leiden mutation.   4. Family history of DVT/PE.   5. Family history of factor V Leiden mutation.     Plan:  I explain to Jackie that now she has 2 unprovoked distal lower extremity DVT's, it is my recommendation that she should remain on indefinite anticoagulation therapy. Jackie seems to be very acceptable to this  recommendation at this time.     I will keep her on rivaroxaban at 20 mg PO Qday dosing at this time. Although she does complaint of heavier than usual menstrual bleeding, she has no other bleeding issues.     Dr. Sabrina Hdez has already order a repeat ultrasound to be done in Feb 2023. I am in agreement with this repeat ultrasound.     I will plan to keep her on rivaroxaban at 20 mg PO Qday for 6 months, then have her return for a virtual follow up visit with me and consider decreasing her dose to 10 mg PO Qday as prophylactic regimen at that time. Will also plan to check a CBC and iron panel at that time.     I will proceed with checking cardiolipin Jocelyn and beta 2 glycoprotein Jocelyn. I am not able to check her lupus anticoagulant as she is on rivaroxaban which does cause a false positive test.     I have answered all her questions today to her satisfaction.       Video-Visit Details:  Type of service:  Video Visit  Video Start Time: 14:01  Video End Time (time video stopped): 14:18  Originating Location (pt. Location): Home  Distant Location (provider location):  [facility]   Mode of Communication:  Video Conference via [employer]      Lizandro Zimmer PA-C, MPAS  Physician Assistant  [facility].     42 minutes spent on the date of the encounter doing chart review, history and exam, documentation and further activities per the note

## 2023-01-12 ENCOUNTER — VIRTUAL VISIT (OUTPATIENT)
Dept: HEMATOLOGY | Facility: CLINIC | Age: 38
End: 2023-01-12
Attending: PHYSICIAN ASSISTANT
Payer: COMMERCIAL

## 2023-01-12 VITALS — BODY MASS INDEX: 32.84 KG/M2 | WEIGHT: 224 LBS

## 2023-01-12 DIAGNOSIS — Z82.49 FAMILY HISTORY OF DVT: ICD-10-CM

## 2023-01-12 DIAGNOSIS — D68.51 FACTOR 5 LEIDEN MUTATION, HETEROZYGOUS (H): Primary | ICD-10-CM

## 2023-01-12 DIAGNOSIS — I82.401 RECURRENT ACUTE DEEP VEIN THROMBOSIS (DVT) OF RIGHT LOWER EXTREMITY (H): ICD-10-CM

## 2023-01-12 DIAGNOSIS — I82.451 ACUTE DEEP VEIN THROMBOSIS (DVT) OF RIGHT PERONEAL VEIN (H): ICD-10-CM

## 2023-01-12 PROCEDURE — 99215 OFFICE O/P EST HI 40 MIN: CPT | Mod: 95 | Performed by: PHYSICIAN ASSISTANT

## 2023-01-12 PROCEDURE — G0463 HOSPITAL OUTPT CLINIC VISIT: HCPCS | Mod: PN,GT | Performed by: PHYSICIAN ASSISTANT

## 2023-01-12 NOTE — LETTER
[facility]  Center for Bleeding and Clotting Disorders  [facility] [address], [address], [place] [address]  Main: [phone], Fax: [phone]    Video Virtual Visit Note:    Patient: Jackie Juan  MRN: [id number]  : 1985  RAMANDEEP: 2023    Due to the ongoing COVID-19 outbreak, this visit was conducted by video, with the patient's approval.      Reason of today's visit:  Right distal lower extremity DVT on 2021.      Clinical History Summary:  Jackie is a 37 year old female with a family history of factor V Leiden mutation and DVT with her mother, who also is found to have heterozygous factor V Leiden mutation back in , developed a right distal lower extremity DVT on 2021 S/P close to 7 months of anticoagulation therapy, who recently is found to have a recurrent occlusive thrombus of the right gastrocnemius vein on 2022, referred by her primary care provider, Kimberly Mckeon PA-C for re-consultation. The patient was last seen by this writer back on 2021.     Thrombosis History Summary:    Mid 2021, she flew from [place] to [place] (which was about 1.5 hours flight).     Just a few weeks prior to her diagnosis of right leg DVT in 2021, she recalls that she was using a different setting for her cross-stitching hobby where she basically sit crossed legged with a 2x4 plank on her leg for 3-4 hours at a time.     Then on 2021, she presented to her primary care provider's office for evaluation of a 2 weeks history of intermittent pain over the right calf worse after prolonged ambulation. Ultrasound at the time showed: occlusive thrombus in one of the paired peroneal veins. For this reason, she was placed on rivaroxaban.     Jackie's mother has bilateral lower extremity DVT and bilateral pulmonary embolism back about 20 years ago when she was in her mid-40's. Her venous thromboembolic event was thought to be unprovoked. She was later found  to have factor V Leiden mutation and thus Jackie was tested for this mutation back in 2013 and is found to have heterozygous factor V Leiden mutation. Because of this finding, Jackie has not been on estrogen containing OCP.     12/6/2021, she established care with this writer for which I have determined that her distal right leg DVT in Aug 2021 was at best a minimally provoked event. At the time, I did discussed with her that we should consider keeping her indefinite anticoagulation therapy.     12/14/2021, repeat right leg venous ultrasound showed persistent thrombus in the right peroneal vein in the proximal / mid calf. Thrombus in the peroneal vein in the distal calf now appears to be resolved. I recommended continuation with rivaroxaban for another 3 months.     3/22/2022, repeat ultrasound showed no DVT demonstrated in the right lower extremity. Previously known right peroneal vein thrombus has resolved and the patient has elected to stop anticoagulation therapy at that point.      Interim History:  On 11/23/2022, she presented to her primary care provider's office with a 3 days complaint of right leg pain. Concern for recurrent DVT, an ultrasound was done which showed occlusive thrombus in the right gastrocnemius vein. She was restarted on rivaroxaban and is referred back to our clinic for consultation.     Currently, she is on rivaroxaban at 20 mg PO Qday dosing. She reports no preceding provoking factors leading up to her 11/23/2022 right leg DVT.     She reports that her right leg pain has completely resolved. She denies any bleeding issues. However, she does noticed that her menstrual bleeding has been heavier that usual after she has started back on rivaroxaban.     ROS:  Denies any bleeding complications. Specifically, no frequent epistaxis. No issues with oral mucosal bleeding. Denies any hematuria or blood in stools. Denies any shortness of breath. No chest pain. No cough. No fever.    Medications:    Current Outpatient Medications   Medication     acyclovir (ZOVIRAX) 400 MG tablet     levothyroxine (SYNTHROID/LEVOTHROID) 75 MCG tablet     rivaroxaban ANTICOAGULANT (XARELTO) 20 MG TABS tablet     No current facility-administered medications for this visit.       Allergies:   Allergies   Allergen Reactions     Shellfish Allergy         PMH:   Past Medical History:   Diagnosis Date     Factor 5 Leiden mutation, heterozygous 02/11/2014     Factor 5 Leiden mutation, heterozygous (H)      Thyroid disease        Social History:   Deferred.    Family History:  As above.     Objective:  Visual Examination via Video:  Pleasant in no acute distress.  Normal work of breathing   A+O x 3    Labs:  Component      Latest Ref Rng & Units 6/17/2022   WBC      4.0 - 11.0 10e3/uL 5.5   RBC Count      3.80 - 5.20 10e6/uL 4.78   Hemoglobin      11.7 - 15.7 g/dL 14.3   Hematocrit      35.0 - 47.0 % 42.4   MCV      78 - 100 fL 89   MCH      26.5 - 33.0 pg 29.9   MCHC      31.5 - 36.5 g/dL 33.7   RDW      10.0 - 15.0 % 12.4   Platelet Count      150 - 450 10e3/uL 252   % Neutrophils      % 60   % Lymphocytes      % 28   % Monocytes      % 9   % Eosinophils      % 3   % Basophils      % 0   % Immature Granulocytes      % 0   Absolute Neutrophils      1.6 - 8.3 10e3/uL 3.3   Absolute Lymphocytes      0.8 - 5.3 10e3/uL 1.5   Absolute Monocytes      0.0 - 1.3 10e3/uL 0.5   Absolute Eosinophils      0.0 - 0.7 10e3/uL 0.2   Absolute Basophils      0.0 - 0.2 10e3/uL 0.0   Absolute Immature Granulocytes      <=0.4 10e3/uL 0.0   Sodium      133 - 144 mmol/L 137   Potassium      3.4 - 5.3 mmol/L 4.2   Chloride      94 - 109 mmol/L 106   Carbon Dioxide      20 - 32 mmol/L 28   Anion Gap      3 - 14 mmol/L 3   Urea Nitrogen      7 - 30 mg/dL 13   Creatinine      0.52 - 1.04 mg/dL 0.80   Calcium      8.5 - 10.1 mg/dL 8.4 (L)   Glucose      70 - 99 mg/dL 96   Alkaline Phosphatase      40 - 150 U/L 64   AST      0 - 45 U/L 16   ALT      0 - 50 U/L  25   Protein Total      6.8 - 8.8 g/dL 7.1   Albumin      3.4 - 5.0 g/dL 3.6   Bilirubin Total      0.2 - 1.3 mg/dL 1.0   GFR Estimate      >60 mL/min/1.73m2 >90   Ferritin      12 - 150 ng/mL 64     Component      Latest Ref Rng & Units 12/14/2021   Antithrombin III Chromogenic      85 - 135 % 95   Prot C Chromogenic      70 - 170 % 86   Protein S Antigen Free      55 - 125 % 62   Prothrombin Gene mutation Negative       Imaging:  Reviewed and are as described above.     Assessment:  In summary, Jackie is a 37 year old female with a family history of DVT/PE and factor V Leiden mutation, who is known to have heterozygous factor V Leiden mutation, who is found to have a distal right leg DVT back in 8/31/2021 S/P close to 7 months of anticoagulation therapy with rivaroxaban at the time, now with recurrent right distal lower extremity DVT on 11/23/2022, referred to our clinic for re-consultation. She participates in today's scheduled video visit for this consultation. Jackie's right distal lower extremity DVT in 8/31/2021 was an unprovoked venous thromboembolic event or may be minimally provoked by the fact that she was sitting cross legged doing cross stitches with a 2x4 plank on her leg for 3-4 hours at a time for the couple weeks prior to her diagnosis of DVT. She completed close to 7 months of anticoagulation therapy with rivaroxaban at the time.     Her 11/23/2022 recurrent right distal leg DVT is also an unprovoked event.      Diagnosis:  1. Right distal lower extremity DVT on 8/31/2021, likely unprovoked.  2. 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   3. Heterozygous factor V Leiden mutation.   4. Family history of DVT/PE.   5. Family history of factor V Leiden mutation.     Plan:  I explain to Jackie that now she has 2 unprovoked distal lower extremity DVT's, it is my recommendation that she should remain on indefinite anticoagulation therapy. Jackie seems to be very acceptable to this  recommendation at this time.     I will keep her on rivaroxaban at 20 mg PO Qday dosing at this time. Although she does complaint of heavier than usual menstrual bleeding, she has no other bleeding issues.     Dr. Sabrina Hdez has already order a repeat ultrasound to be done in Feb 2023. I am in agreement with this repeat ultrasound.     I will plan to keep her on rivaroxaban at 20 mg PO Qday for 6 months, then have her return for a virtual follow up visit with me and consider decreasing her dose to 10 mg PO Qday as prophylactic regimen at that time. Will also plan to check a CBC and iron panel at that time.     I will proceed with checking cardiolipin Jocelyn and beta 2 glycoprotein Jocelyn. I am not able to check her lupus anticoagulant as she is on rivaroxaban which does cause a false positive test.     I have answered all her questions today to her satisfaction.       Video-Visit Details:  Type of service:  Video Visit  Video Start Time: 14:01  Video End Time (time video stopped): 14:18  Originating Location (pt. Location): Home  Distant Location (provider location):  [facility] FOR BLEEDING AND CLOTT[facility] DISORDERS   Mode of Communication:  Video Conference via icomasoft      Lizandro Zimmer PA-C, MPAS  Physician Assistant  [facility] for Bleeding and Clotting Disorders.     42 minutes spent on the date of the encounter doing chart review, history and exam, documentation and further activities per the note        Vitals completed along with Medication and Allergy review.    Neha Jennings

## 2023-01-12 NOTE — PATIENT INSTRUCTIONS
Jackie,    It was nice to see you again via video visit today.    Below is a summary of our plan:  Continue with anticoagulation therapy indefinitely.  Continue to take rivaroxaban (Xarelto) at 20 mg by mouth every 24 hours with food.   Agree with your primary care provider's plan to repeat your right leg ultrasound in Feb 2023.   One of our nursing staffs will contact you to arrange to have your lab tests collected in the next 1-2 weeks at the closest [facility]. These tests are cardiolipin Jocelyn and beta 2 glycoprotein Jocelyn.   If you should have any further questions, or experience any unusual bleeding issues or if you should need any invasive or surgical procedures in the future, please call us at [phone] and ask to speak to a nursing staff.  One of our administrative assistants will contact you to schedule your return visit with me in 6 months.     Thank you once again in choosing our clinic as part of your healthcare team.      Lizandro Zimmer PA-C, MPAS  Physician Assistant  [facility] for Bleeding and Clotting Disorders.

## 2023-01-12 NOTE — Clinical Note
2023       RE: Jackie Juan  [id number] [facility] [address]     Dear Colleague,    Thank you for referring your patient, Jackie Juan, to the [facility] CENTER FOR BLEEDING AND CLOTTING DISORDERS at [facility]. Please see a copy of my visit note below.        [facility]  Center for Bleeding and Clotting Disorders  [facility], [address], [place] [address]  Main: [phone], Fax: [phone]    Video Virtual Visit Note:    Patient: Jackie Juan  MRN: [id number]  : 1985  RAMANDEEP: 2023    Due to the ongoing COVID-19 outbreak, this visit was conducted by video, with the patient's approval.      Reason of today's visit:  Right distal lower extremity DVT on 2021.      Clinical History Summary:  Jackie is a 37 year old female with a family history of factor V Leiden mutation and DVT with her mother, who also is found to have heterozygous factor V Leiden mutation back in , developed a right distal lower extremity DVT on 2021 S/P close to 7 months of anticoagulation therapy, who recently is found to have a recurrent occlusive thrombus of the right gastrocnemius vein on 2022, referred by her primary care provider, Kimberly Mckeon PA-C for re-consultation. The patient was last seen by this writer back on 2021.     Thrombosis History Summary:    Mid 2021, she flew from [place] to [place] (which was about 1.5 hours flight).     Just a few weeks prior to her diagnosis of right leg DVT in 2021, she recalls that she was using a different setting for her cross-stitching hobby where she basically sit crossed legged with a 2x4 plank on her leg for 3-4 hours at a time.     Then on 2021, she presented to her primary care provider's office for evaluation of a 2 weeks history of intermittent pain over the right calf worse after prolonged ambulation. Ultrasound at the time showed: occlusive  thrombus in one of the paired peroneal veins. For this reason, she was placed on rivaroxaban.     Jackie's mother has bilateral lower extremity DVT and bilateral pulmonary embolism back about 20 years ago when she was in her mid-40's. Her venous thromboembolic event was thought to be unprovoked. She was later found to have factor V Leiden mutation and thus Jackie was tested for this mutation back in 2013 and is found to have heterozygous factor V Leiden mutation. Because of this finding, Jackie has not been on estrogen containing OCP.     12/6/2021, she established care with this writer for which I have determined that her distal right leg DVT in Aug 2021 was at best a minimally provoked event. At the time, I did discussed with her that we should consider keeping her indefinite anticoagulation therapy.     12/14/2021, repeat right leg venous ultrasound showed persistent thrombus in the right peroneal vein in the proximal / mid calf. Thrombus in the peroneal vein in the distal calf now appears to be resolved. I recommended continuation with rivaroxaban for another 3 months.     3/22/2022, repeat ultrasound showed no DVT demonstrated in the right lower extremity. Previously known right peroneal vein thrombus has resolved and the patient has elected to stop anticoagulation therapy at that point.      Interim History:  On 11/23/2022, she presented to her primary care provider's office with a 3 days complaint of right leg pain. Concern for recurrent DVT, an ultrasound was done which showed occlusive thrombus in the right gastrocnemius vein. She was restarted on rivaroxaban and is referred back to our clinic for consultation.     Currently, she is on rivaroxaban at 20 mg PO Qday dosing. She reports no preceding provoking factors leading up to her 11/23/2022 right leg DVT.     She reports that her right leg pain has completely resolved. She denies any bleeding issues. However, she does noticed that her menstrual bleeding has  been heavier that usual after she has started back on rivaroxaban.     ROS:  Denies any bleeding complications. Specifically, no frequent epistaxis. No issues with oral mucosal bleeding. Denies any hematuria or blood in stools. Denies any shortness of breath. No chest pain. No cough. No fever.    Medications:   Current Outpatient Medications   Medication     acyclovir (ZOVIRAX) 400 MG tablet     levothyroxine (SYNTHROID/LEVOTHROID) 75 MCG tablet     rivaroxaban ANTICOAGULANT (XARELTO) 20 MG TABS tablet     No current facility-administered medications for this visit.       Allergies:   Allergies   Allergen Reactions     Shellfish Allergy         PMH:   Past Medical History:   Diagnosis Date     Factor 5 Leiden mutation, heterozygous 02/11/2014     Factor 5 Leiden mutation, heterozygous (H)      Thyroid disease        Social History:   Deferred.    Family History:  As above.     Objective:  Visual Examination via Video:  Pleasant in no acute distress.  Normal work of breathing   A+O x 3    Labs:  Component      Latest Ref Rng & Units 6/17/2022   WBC      4.0 - 11.0 10e3/uL 5.5   RBC Count      3.80 - 5.20 10e6/uL 4.78   Hemoglobin      11.7 - 15.7 g/dL 14.3   Hematocrit      35.0 - 47.0 % 42.4   MCV      78 - 100 fL 89   MCH      26.5 - 33.0 pg 29.9   MCHC      31.5 - 36.5 g/dL 33.7   RDW      10.0 - 15.0 % 12.4   Platelet Count      150 - 450 10e3/uL 252   % Neutrophils      % 60   % Lymphocytes      % 28   % Monocytes      % 9   % Eosinophils      % 3   % Basophils      % 0   % Immature Granulocytes      % 0   Absolute Neutrophils      1.6 - 8.3 10e3/uL 3.3   Absolute Lymphocytes      0.8 - 5.3 10e3/uL 1.5   Absolute Monocytes      0.0 - 1.3 10e3/uL 0.5   Absolute Eosinophils      0.0 - 0.7 10e3/uL 0.2   Absolute Basophils      0.0 - 0.2 10e3/uL 0.0   Absolute Immature Granulocytes      <=0.4 10e3/uL 0.0   Sodium      133 - 144 mmol/L 137   Potassium      3.4 - 5.3 mmol/L 4.2   Chloride      94 - 109 mmol/L 106    Carbon Dioxide      20 - 32 mmol/L 28   Anion Gap      3 - 14 mmol/L 3   Urea Nitrogen      7 - 30 mg/dL 13   Creatinine      0.52 - 1.04 mg/dL 0.80   Calcium      8.5 - 10.1 mg/dL 8.4 (L)   Glucose      70 - 99 mg/dL 96   Alkaline Phosphatase      40 - 150 U/L 64   AST      0 - 45 U/L 16   ALT      0 - 50 U/L 25   Protein Total      6.8 - 8.8 g/dL 7.1   Albumin      3.4 - 5.0 g/dL 3.6   Bilirubin Total      0.2 - 1.3 mg/dL 1.0   GFR Estimate      >60 mL/min/1.73m2 >90   Ferritin      12 - 150 ng/mL 64     Component      Latest Ref Rng & Units 12/14/2021   Antithrombin III Chromogenic      85 - 135 % 95   Prot C Chromogenic      70 - 170 % 86   Protein S Antigen Free      55 - 125 % 62   Prothrombin Gene mutation Negative       Imaging:  Reviewed and are as described above.     Assessment:  In summary, Jackie is a 37 year old female with a family history of DVT/PE and factor V Leiden mutation, who is known to have heterozygous factor V Leiden mutation, who is found to have a distal right leg DVT back in 8/31/2021 S/P close to 7 months of anticoagulation therapy with rivaroxaban at the time, now with recurrent right distal lower extremity DVT on 11/23/2022, referred to our clinic for re-consultation. She participates in today's scheduled video visit for this consultation. Jackie's right distal lower extremity DVT in 8/31/2021 was an unprovoked venous thromboembolic event or may be minimally provoked by the fact that she was sitting cross legged doing cross stitches with a 2x4 plank on her leg for 3-4 hours at a time for the couple weeks prior to her diagnosis of DVT. She completed close to 7 months of anticoagulation therapy with rivaroxaban at the time.     Her 11/23/2022 recurrent right distal leg DVT is also an unprovoked event.      Diagnosis:  1. Right distal lower extremity DVT on 8/31/2021, likely unprovoked.  2. 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   3. Heterozygous factor V  Leiden mutation.   4. Family history of DVT/PE.   5. Family history of factor V Leiden mutation.     Plan:  I explain to Jackie that now she has 2 unprovoked distal lower extremity DVT's, it is my recommendation that she should remain on indefinite anticoagulation therapy. Jackie seems to be very acceptable to this recommendation at this time.     I will keep her on rivaroxaban at 20 mg PO Qday dosing at this time. Although she does complaint of heavier than usual menstrual bleeding, she has no other bleeding issues.     Dr. Sabrina Hdez has already order a repeat ultrasound to be done in Feb 2023. I am in agreement with this repeat ultrasound.     I will plan to keep her on rivaroxaban at 20 mg PO Qday for 6 months, then have her return for a virtual follow up visit with me and consider decreasing her dose to 10 mg PO Qday as prophylactic regimen at that time. Will also plan to check a CBC and iron panel at that time.     I will proceed with checking cardiolipin Jocelyn and beta 2 glycoprotein Jocelyn. I am not able to check her lupus anticoagulant as she is on rivaroxaban which does cause a false positive test.     I have answered all her questions today to her satisfaction.       Video-Visit Details:  Type of service:  Video Visit  Video Start Time: 14:01  Video End Time (time video stopped): 14:18  Originating Location (pt. Location): Home  Distant Location (provider location):  [facility] FOR BLEEDING AND CLOTTING DISORDERS   Mode of Communication:  Video Conference via [employer]      Lizandro Zimmer PA-C, MPAS  Physician Assistant  [facility] for Bleeding and Clotting Disorders.     42 minutes spent on the date of the encounter doing chart review, history and exam, documentation and further activities per the note        Vitals completed along with Medication and Allergy review.    Neha Jennings         Again, thank you for allowing me to participate in the care of your patient.       Sincerely,    Lizandro Zimmer PA-C

## 2023-01-13 ENCOUNTER — TELEPHONE (OUTPATIENT)
Dept: HEMATOLOGY | Facility: CLINIC | Age: 38
End: 2023-01-13

## 2023-01-13 NOTE — TELEPHONE ENCOUNTER
Incoming message from Lizandro Zimmer PA-C requesting RN to reach out to patient and schedule Beta 2 glycoprotein Jocelyn and Cardiolipin Jocelyn testing to be done in the next 1-2 weeks. RN called patient and left a voicemail with call-back number. RN also sent [employer] message. Pending patient response.    Bhakti Malloy RN, BSN, PCCN  Nurse Clinician    [facility] for Bleeding and Clotting Disorders  [address] [place] [address], [address], [place] [address]   Office, direct: [phone]  Main office number: [phone]  Pronouns: She, her, hers

## 2023-01-19 ENCOUNTER — LAB (OUTPATIENT)
Dept: LAB | Facility: CLINIC | Age: 38
End: 2023-01-19
Payer: COMMERCIAL

## 2023-01-19 DIAGNOSIS — D68.51 FACTOR 5 LEIDEN MUTATION, HETEROZYGOUS (H): ICD-10-CM

## 2023-01-19 DIAGNOSIS — I82.401 RECURRENT ACUTE DEEP VEIN THROMBOSIS (DVT) OF RIGHT LOWER EXTREMITY (H): ICD-10-CM

## 2023-01-19 DIAGNOSIS — Z82.49 FAMILY HISTORY OF DVT: ICD-10-CM

## 2023-01-19 PROCEDURE — 36415 COLL VENOUS BLD VENIPUNCTURE: CPT

## 2023-01-19 PROCEDURE — 86146 BETA-2 GLYCOPROTEIN ANTIBODY: CPT | Mod: 59

## 2023-01-19 PROCEDURE — 86146 BETA-2 GLYCOPROTEIN ANTIBODY: CPT

## 2023-01-19 PROCEDURE — 86147 CARDIOLIPIN ANTIBODY EA IG: CPT | Mod: 59

## 2023-01-23 LAB
B2 GLYCOPROT1 IGG SERPL IA-ACNC: 1.5 U/ML
B2 GLYCOPROT1 IGM SERPL IA-ACNC: <2.4 U/ML
CARDIOLIPIN IGG SER IA-ACNC: 3.2 GPL-U/ML
CARDIOLIPIN IGG SER IA-ACNC: NEGATIVE
CARDIOLIPIN IGM SER IA-ACNC: 3.8 MPL-U/ML
CARDIOLIPIN IGM SER IA-ACNC: NEGATIVE

## 2023-03-23 ENCOUNTER — ANCILLARY PROCEDURE (OUTPATIENT)
Dept: ULTRASOUND IMAGING | Facility: CLINIC | Age: 38
End: 2023-03-23
Attending: FAMILY MEDICINE
Payer: COMMERCIAL

## 2023-03-23 DIAGNOSIS — D68.51 FACTOR 5 LEIDEN MUTATION, HETEROZYGOUS (H): Chronic | ICD-10-CM

## 2023-03-23 DIAGNOSIS — I82.461 ACUTE DEEP VEIN THROMBOSIS (DVT) OF CALF MUSCLE VEIN OF RIGHT LOWER EXTREMITY (H): ICD-10-CM

## 2023-03-23 PROCEDURE — 93971 EXTREMITY STUDY: CPT | Mod: RT | Performed by: RADIOLOGY

## 2023-06-22 ENCOUNTER — LAB (OUTPATIENT)
Dept: LAB | Facility: CLINIC | Age: 38
End: 2023-06-22
Payer: COMMERCIAL

## 2023-06-22 DIAGNOSIS — E03.9 HYPOTHYROIDISM: ICD-10-CM

## 2023-06-22 LAB — TSH SERPL DL<=0.005 MIU/L-ACNC: 3.29 UIU/ML (ref 0.3–4.2)

## 2023-06-22 PROCEDURE — 36415 COLL VENOUS BLD VENIPUNCTURE: CPT

## 2023-06-22 PROCEDURE — 84443 ASSAY THYROID STIM HORMONE: CPT

## 2023-07-12 NOTE — PROGRESS NOTES
[facility]  Center for Ble[facility]ot[facility]  [facility], [address], [place] [address]  Main: [phone], Fax: [phone]    Video Virtual Visit Note:    Patient: Jackie Juan  MRN: [id number]  : 1985  RAMANDEEP: 2023        Due to the ongoing COVID-19 outbreak, this visit was conducted by video, with the patient's approval.      Reason of today's visit:  Right distal lower extremity DVT on 2021.      Clinical History Summary:  Jackie is a 37 year old female with a family history of factor V Leiden mutation and DVT with her mother, who also is found to have heterozygous factor V Leiden mutation back in , developed a right distal lower extremity DVT on 2021 S/P close to 7 months of anticoagulation therapy, who recently is found to have a recurrent occlusive thrombus of the right gastrocnemius vein on 2022, currently on rivaroxaban at 20 mg PO Qday, returns to clinic today for her follow up visit. The patient was last seen by this writer back on 2023.     Thrombosis History Summary:    Mid 2021, she flew from [place] to [place] (which was about 1.5 hours flight).     Just a few weeks prior to her diagnosis of right leg DVT in 2021, she recalls that she was using a different setting for her cross-stitching hobby where she basically sit crossed legged with a 2x4 plank on her leg for 3-4 hours at a time.     Then on 2021, she presented to her primary care provider's office for evaluation of a 2 weeks history of intermittent pain over the right calf worse after prolonged ambulation. Ultrasound at the time showed: occlusive thrombus in one of the paired peroneal veins. For this reason, she was placed on rivaroxaban.     Jackie's mother has bilateral lower extremity DVT and bilateral pulmonary embolism back about 20 years ago when she was in her mid-40's. Her venous thromboembolic event was thought to be unprovoked. She was  later found to have factor V Leiden mutation and thus Jackie was tested for this mutation back in 2013 and is found to have heterozygous factor V Leiden mutation. Because of this finding, Jackie has not been on estrogen containing OCP.     12/6/2021, she established care with this writer for which I have determined that her distal right leg DVT in Aug 2021 was at best a minimally provoked event. At the time, I did discussed with her that we should consider keeping her indefinite anticoagulation therapy.     12/14/2021, repeat right leg venous ultrasound showed persistent thrombus in the right peroneal vein in the proximal / mid calf. Thrombus in the peroneal vein in the distal calf now appears to be resolved. I recommended continuation with rivaroxaban for another 3 months.     3/22/2022, repeat ultrasound showed no DVT demonstrated in the right lower extremity. Previously known right peroneal vein thrombus has resolved and the patient has elected to stop anticoagulation therapy at that point.     11/23/2022, she presented to her primary care provider's office with a 3 days complaint of right leg pain. Concern for recurrent DVT, an ultrasound was done which showed occlusive thrombus in the right gastrocnemius vein. She was restarted on rivaroxaban and is referred back to our clinic for consultation.      Interim History:    1/12/2023, re-established care with this writer and recommended that she should remain on indefinite anticoagulation therapy. She returns today for 6 months follow up and to discuss potentially decreasing her dose of rivaroxaban to prophylactic regimen and to check a CBC and iron panel.     1/19/2023, Cardiolipin Abys and beta 2 glycoprotein Abys were negative.     3/23/2023, Repeat right leg venous ultrasound showed no DVT demonstrated in the right leg. Previous right gastrocnemius DVT has cleared.     Currently she is on rivaroxaban at 20 mg PO Qday dosing. She denies any bleeding issues. She  recalls that her menstrual bleeding was heavier than her normal for the first few months of her starting on rivaroxban but for the last couple of months, it seems to be back to normal.    ROS:  Denies any bleeding complications. Specifically, no frequent epistaxis. No issues with oral mucosal bleeding. Denies any hematuria or blood in stools. Denies any shortness of breath. No chest pain. No cough. No fever.    Medications:   Current Outpatient Medications   Medication     acyclovir (ZOVIRAX) 400 MG tablet     levothyroxine (SYNTHROID/LEVOTHROID) 75 MCG tablet     XARELTO ANTICOAGULANT 20 MG TABS tablet     No current facility-administered medications for this visit.     Allergies:   Allergies   Allergen Reactions     Shellfish Allergy         PMH:   Past Medical History:   Diagnosis Date     Factor 5 Leiden mutation, heterozygous 02/11/2014     Factor 5 Leiden mutation, heterozygous (H)      Thyroid disease        Social History:   Deferred.    Family History:  Deferred.     Objective:  Visual Examination via Video:  Pleasant in no acute distress.  Normal work of breathing   A+O x 3    Labs:  Component      Latest Ref Rng 1/19/2023  7:33 AM   Cardiolipin Jocelyn IgG Instrument Value      <10.0 GPL-U/mL 3.2    Cardiolipin IgG Jocelyn      Negative  Negative    Cardiolipin Jocelyn IgM Instrument Value      <10.0 MPL-U/mL 3.8    Cardiolipin IgM Jocelyn      Negative  Negative    Beta 2 Glycoprotein 1 Antibody IgG      <7.0 U/mL 1.5    Beta 2 Glycoprotein 1 Antibody IgM      <7.0 U/mL <2.4        Imaging:  Reviewed and is as described above.     Assessment:  In summary, Jackie is a 37 year old female with a family history of DVT/PE and factor V Leiden mutation, who is known to have heterozygous factor V Leiden mutation, who is found to have a distal right leg DVT back in 8/31/2021 S/P close to 7 months of anticoagulation therapy with rivaroxaban at the time, then recurrent right distal lower extremity DVT on 11/23/2022, currently on  indefinite anticoagulation therapy with rivaroxaban at 20 mg PO Qday, participates in today's scheduled video visit for her follow up visit. She was last seen by this writer back on 1/12/2023.     Jackie's right distal lower extremity DVT in 8/31/2021 was an unprovoked venous thromboembolic event or may be minimally provoked by the fact that she was sitting cross legged doing cross stitches with a 2x4 plank on her leg for 3-4 hours at a time for the couple weeks prior to her diagnosis of DVT. She completed close to 7 months of anticoagulation therapy with rivaroxaban at the time.      Her 11/23/2022 recurrent right distal leg DVT is also an unprovoked event.      Diagnosis:  1. Right distal lower extremity DVT on 8/31/2021, likely unprovoked.  2. 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   3. Heterozygous factor V Leiden mutation.   4. Family history of DVT/PE.   5. Family history of factor V Leiden mutation.     Plan:  Jackie remains to be a good candidate to stay on indefinite anticoagulation therapy. She would like to stay on the full therapeutic dose of rivaroxaban at this time, which is totally reasonable. Thus I have renew her rivaroxaban prescription today with one year refill.     I will check her CBC, CMP and iron panel in the next 1-2 weeks. She is instructed to go to her closest [facility] to get these done.     I have instructed her to call our clinic if she should experience any unusual bleeding issues or if she should need any invasive or surgical procedures in the future. Otherwise, I will plan to see her back in one year (virtual visit is fine).       Video-Visit Details:  Type of service:  Video Visit  Video Start Time: 12:05  Video End Time (time video stopped): 12:14  Originating Location (pt. Location): Home  Distant Location (provider location):  [facility]   Mode of Communication:  Video Conference via  Quang Zimmer PA-C, MPAS  Physician Assistant  [facility] for [facility].     20 minutes spent by me on the date of the encounter doing chart review, history and exam, documentation and further activities per the note

## 2023-07-13 ENCOUNTER — VIRTUAL VISIT (OUTPATIENT)
Dept: HEMATOLOGY | Facility: CLINIC | Age: 38
End: 2023-07-13
Attending: PHYSICIAN ASSISTANT
Payer: COMMERCIAL

## 2023-07-13 DIAGNOSIS — Z83.2 FAMILY HISTORY OF FACTOR V LEIDEN MUTATION: ICD-10-CM

## 2023-07-13 DIAGNOSIS — I82.401 RECURRENT ACUTE DEEP VEIN THROMBOSIS (DVT) OF RIGHT LOWER EXTREMITY (H): ICD-10-CM

## 2023-07-13 DIAGNOSIS — D68.51 FACTOR 5 LEIDEN MUTATION, HETEROZYGOUS (H): Primary | ICD-10-CM

## 2023-07-13 DIAGNOSIS — Z82.49 FAMILY HISTORY OF DVT: ICD-10-CM

## 2023-07-13 DIAGNOSIS — I82.461 ACUTE DEEP VEIN THROMBOSIS (DVT) OF CALF MUSCLE VEIN OF RIGHT LOWER EXTREMITY (H): ICD-10-CM

## 2023-07-13 DIAGNOSIS — Z79.01 CHRONIC ANTICOAGULATION: ICD-10-CM

## 2023-07-13 PROCEDURE — 99213 OFFICE O/P EST LOW 20 MIN: CPT | Mod: VID | Performed by: PHYSICIAN ASSISTANT

## 2023-07-13 NOTE — PATIENT INSTRUCTIONS
Jackie,    It was nice to see you via video visit today.    Below is a summary of our plan:  Please continue to take your rivaroxaban (Xarelto) at 20 mg by mouth every 24 hours.  Please go to your closest [facility] in the next 1-2 weeks to get your labs done, Complete blood count, comprehensive metabolic panel and iron panel. I will message you via [employer] when these results are back.  If you should have any further questions, or experience any unusual bleeding issues or if you should need any invasive or surgical procedures in the future, please call us at [phone] and ask to speak to a nursing staff.  One of our administrative assistants will contact you to schedule your return visit with me in one year.     Thank you once again in choosing our clinic as part of your healthcare team.      Lizandro Zimmer PA-C, MPAS  Physician Assistant  [facility] for Bleeding [facility] Clotting Disorders.

## 2023-07-13 NOTE — PROGRESS NOTES
Patient was contacted to complete the pre-visit call prior to their telephone visit with the provider.     Allergies and medications were reviewed.     I thanked them for their time to cover this information.     Magdalena Marie MA

## 2023-07-21 ENCOUNTER — LAB (OUTPATIENT)
Dept: LAB | Facility: CLINIC | Age: 38
End: 2023-07-21
Payer: COMMERCIAL

## 2023-07-21 DIAGNOSIS — I82.461 ACUTE DEEP VEIN THROMBOSIS (DVT) OF CALF MUSCLE VEIN OF RIGHT LOWER EXTREMITY (H): ICD-10-CM

## 2023-07-21 DIAGNOSIS — Z79.01 CHRONIC ANTICOAGULATION: ICD-10-CM

## 2023-07-21 DIAGNOSIS — Z13.220 LIPID SCREENING: Primary | ICD-10-CM

## 2023-07-21 DIAGNOSIS — Z82.49 FAMILY HISTORY OF DVT: ICD-10-CM

## 2023-07-21 DIAGNOSIS — D68.51 FACTOR 5 LEIDEN MUTATION, HETEROZYGOUS (H): ICD-10-CM

## 2023-07-21 DIAGNOSIS — Z83.2 FAMILY HISTORY OF FACTOR V LEIDEN MUTATION: ICD-10-CM

## 2023-07-21 DIAGNOSIS — I82.401 RECURRENT ACUTE DEEP VEIN THROMBOSIS (DVT) OF RIGHT LOWER EXTREMITY (H): ICD-10-CM

## 2023-07-21 LAB
ALBUMIN SERPL BCG-MCNC: 4.2 G/DL (ref 3.5–5.2)
ALP SERPL-CCNC: 51 U/L (ref 35–104)
ALT SERPL W P-5'-P-CCNC: 10 U/L (ref 0–50)
ANION GAP SERPL CALCULATED.3IONS-SCNC: 11 MMOL/L (ref 7–15)
AST SERPL W P-5'-P-CCNC: 18 U/L (ref 0–45)
BILIRUB SERPL-MCNC: 0.7 MG/DL
BUN SERPL-MCNC: 17.1 MG/DL (ref 6–20)
CALCIUM SERPL-MCNC: 9.2 MG/DL (ref 8.6–10)
CHLORIDE SERPL-SCNC: 105 MMOL/L (ref 98–107)
CHOLEST SERPL-MCNC: 152 MG/DL
CREAT SERPL-MCNC: 0.85 MG/DL (ref 0.51–0.95)
DEPRECATED HCO3 PLAS-SCNC: 21 MMOL/L (ref 22–29)
ERYTHROCYTE [DISTWIDTH] IN BLOOD BY AUTOMATED COUNT: 11.8 % (ref 10–15)
FERRITIN SERPL-MCNC: 79 NG/ML (ref 6–175)
GFR SERPL CREATININE-BSD FRML MDRD: 89 ML/MIN/1.73M2
GLUCOSE SERPL-MCNC: 94 MG/DL (ref 70–99)
HCT VFR BLD AUTO: 39.7 % (ref 35–47)
HDLC SERPL-MCNC: 53 MG/DL
HGB BLD-MCNC: 13.5 G/DL (ref 11.7–15.7)
IRON BINDING CAPACITY (ROCHE): 244 UG/DL (ref 240–430)
IRON SATN MFR SERPL: 17 % (ref 15–46)
IRON SERPL-MCNC: 42 UG/DL (ref 37–145)
LDLC SERPL CALC-MCNC: 88 MG/DL
MCH RBC QN AUTO: 30.6 PG (ref 26.5–33)
MCHC RBC AUTO-ENTMCNC: 34 G/DL (ref 31.5–36.5)
MCV RBC AUTO: 90 FL (ref 78–100)
NONHDLC SERPL-MCNC: 99 MG/DL
PLATELET # BLD AUTO: 227 10E3/UL (ref 150–450)
POTASSIUM SERPL-SCNC: 4.3 MMOL/L (ref 3.4–5.3)
PROT SERPL-MCNC: 7.2 G/DL (ref 6.4–8.3)
RBC # BLD AUTO: 4.41 10E6/UL (ref 3.8–5.2)
SODIUM SERPL-SCNC: 137 MMOL/L (ref 136–145)
TRIGL SERPL-MCNC: 57 MG/DL
WBC # BLD AUTO: 4.7 10E3/UL (ref 4–11)

## 2023-07-21 PROCEDURE — 80061 LIPID PANEL: CPT

## 2023-07-21 PROCEDURE — 80053 COMPREHEN METABOLIC PANEL: CPT

## 2023-07-21 PROCEDURE — 83550 IRON BINDING TEST: CPT

## 2023-07-21 PROCEDURE — 36415 COLL VENOUS BLD VENIPUNCTURE: CPT

## 2023-07-21 PROCEDURE — 82728 ASSAY OF FERRITIN: CPT

## 2023-07-21 PROCEDURE — 85027 COMPLETE CBC AUTOMATED: CPT

## 2023-07-21 PROCEDURE — 83540 ASSAY OF IRON: CPT

## 2023-08-28 DIAGNOSIS — E03.9 HYPOTHYROIDISM, UNSPECIFIED TYPE: ICD-10-CM

## 2023-08-29 RX ORDER — LEVOTHYROXINE SODIUM 75 UG/1
TABLET ORAL
Qty: 90 TABLET | Refills: 0 | Status: SHIPPED | OUTPATIENT
Start: 2023-08-29 | End: 2023-11-01

## 2023-10-01 ENCOUNTER — HEALTH MAINTENANCE LETTER (OUTPATIENT)
Age: 38
End: 2023-10-01

## 2023-10-11 NOTE — TELEPHONE ENCOUNTER
RN Triage    Patient Contact    Attempt # 1    Was call answered?  No.  Left message on voicemail with information to call me back.    [employer] message sent also.    Elena Forrest RN on 10/7/2021 at 2:38 PM         Alar Island Pedicle Flap Text: The defect edges were debeveled with a #15 scalpel blade. Given the location of the defect, shape of the defect and the proximity to the alar rim an island pedicle advancement flap was deemed most appropriate. Using a sterile surgical marker, an appropriate advancement flap was drawn incorporating the defect, outlining the appropriate donor tissue and placing the expected incisions within the nasal ala running parallel to the alar rim. The area thus outlined was incised with a #15 scalpel blade. The skin margins were undermined minimally to an appropriate distance in all directions around the primary defect and laterally outward around the island pedicle utilizing iris scissors.  There was minimal undermining beneath the pedicle flap. Following this, the designed flap was carried over into the primary defect and sutured into place.

## 2023-10-29 ASSESSMENT — ENCOUNTER SYMPTOMS
FREQUENCY: 0
EYE PAIN: 0
PARESTHESIAS: 0
DIZZINESS: 0
CONSTIPATION: 0
COUGH: 0
ARTHRALGIAS: 0
HEADACHES: 0
MYALGIAS: 0
HEARTBURN: 0
HEMATURIA: 0
DIARRHEA: 0
ABDOMINAL PAIN: 0
BREAST MASS: 0
PALPITATIONS: 0
WEAKNESS: 0
JOINT SWELLING: 0
HEMATOCHEZIA: 0
SORE THROAT: 0
SHORTNESS OF BREATH: 0
NAUSEA: 0
FEVER: 0
DYSURIA: 0
CHILLS: 0
NERVOUS/ANXIOUS: 0

## 2023-11-01 ENCOUNTER — OFFICE VISIT (OUTPATIENT)
Dept: FAMILY MEDICINE | Facility: CLINIC | Age: 38
End: 2023-11-01
Attending: FAMILY MEDICINE
Payer: COMMERCIAL

## 2023-11-01 VITALS
SYSTOLIC BLOOD PRESSURE: 126 MMHG | RESPIRATION RATE: 20 BRPM | WEIGHT: 210 LBS | OXYGEN SATURATION: 100 % | TEMPERATURE: 98.6 F | HEIGHT: 69 IN | HEART RATE: 74 BPM | BODY MASS INDEX: 31.1 KG/M2 | DIASTOLIC BLOOD PRESSURE: 76 MMHG

## 2023-11-01 DIAGNOSIS — Z00.00 ROUTINE GENERAL MEDICAL EXAMINATION AT A HEALTH CARE FACILITY: Primary | ICD-10-CM

## 2023-11-01 DIAGNOSIS — Z12.31 ENCOUNTER FOR SCREENING MAMMOGRAM FOR BREAST CANCER: ICD-10-CM

## 2023-11-01 DIAGNOSIS — E03.9 HYPOTHYROIDISM, UNSPECIFIED TYPE: ICD-10-CM

## 2023-11-01 DIAGNOSIS — N92.0 SPOTTING BETWEEN MENSES: ICD-10-CM

## 2023-11-01 DIAGNOSIS — D68.51 FACTOR 5 LEIDEN MUTATION, HETEROZYGOUS (H): Chronic | ICD-10-CM

## 2023-11-01 DIAGNOSIS — B00.1 RECURRENT COLD SORES: ICD-10-CM

## 2023-11-01 DIAGNOSIS — R20.9 SKIN SENSATION DISTURBANCE: ICD-10-CM

## 2023-11-01 PROCEDURE — 90480 ADMN SARSCOV2 VAC 1/ONLY CMP: CPT | Performed by: FAMILY MEDICINE

## 2023-11-01 PROCEDURE — 99214 OFFICE O/P EST MOD 30 MIN: CPT | Mod: 25 | Performed by: FAMILY MEDICINE

## 2023-11-01 PROCEDURE — 91320 SARSCV2 VAC 30MCG TRS-SUC IM: CPT | Performed by: FAMILY MEDICINE

## 2023-11-01 PROCEDURE — 99396 PREV VISIT EST AGE 40-64: CPT | Performed by: FAMILY MEDICINE

## 2023-11-01 RX ORDER — ACYCLOVIR 400 MG/1
400 TABLET ORAL 2 TIMES DAILY
Qty: 30 TABLET | Refills: 1 | Status: SHIPPED | OUTPATIENT
Start: 2023-11-01

## 2023-11-01 RX ORDER — LEVOTHYROXINE SODIUM 75 UG/1
TABLET ORAL
Qty: 90 TABLET | Refills: 2 | Status: SHIPPED | OUTPATIENT
Start: 2023-11-01 | End: 2024-09-18

## 2023-11-01 ASSESSMENT — ENCOUNTER SYMPTOMS
FEVER: 0
PALPITATIONS: 0
NERVOUS/ANXIOUS: 0
EYE PAIN: 0
BREAST MASS: 0
SHORTNESS OF BREATH: 0
HEMATOCHEZIA: 0
COUGH: 0
DIARRHEA: 0
SORE THROAT: 0
HEMATURIA: 0
PARESTHESIAS: 0
JOINT SWELLING: 0
CHILLS: 0
ARTHRALGIAS: 0
ABDOMINAL PAIN: 0
CONSTIPATION: 0
NAUSEA: 0
HEADACHES: 0
DYSURIA: 0
MYALGIAS: 0
FREQUENCY: 0
DIZZINESS: 0
HEARTBURN: 0
WEAKNESS: 0

## 2023-11-01 ASSESSMENT — PAIN SCALES - GENERAL: PAINLEVEL: MILD PAIN (3)

## 2023-11-01 NOTE — PROGRESS NOTES
SUBJECTIVE:   CC: Jackie is an 38 year old who presents for preventive health visit.       11/1/2023     3:38 PM   Additional Questions   Roomed by Anju Marlow CMA   Accompanied by self         11/1/2023     3:38 PM   Patient Reported Additional Medications   Patient reports taking the following new medications none       Healthy Habits:     Getting at least 3 servings of Calcium per day:  Yes    Bi-annual eye exam:  Yes    Dental care twice a year:  Yes    Sleep apnea or symptoms of sleep apnea:  None    Diet:  Breakfast skipped    Frequency of exercise:  4-5 days/week    Duration of exercise:  30-45 minutes    Taking medications regularly:  Yes    Medication side effects:  None    Additional concerns today:  Yes (knees from fall are still painful, menstrual cycles are off with some spotting)      Today's PHQ-2 Score:       11/1/2023     7:44 AM   PHQ-2 ( 1999 [employer])   Q1: Little interest or pleasure in doing things 0   Q2: Feeling down, depressed or hopeless 0   PHQ-2 Score 0   Q1: Little interest or pleasure in doing things Not at all   Q2: Feeling down, depressed or hopeless Not at all   PHQ-2 Score 0           Spotting between periods for the last 3 months or so.  Periods are otherwise ok. Usually about a week after her period. No significant pain. No other spotting.     Left knee. Fell about a month ago and had numbness over the anterior knee. No prior knee issues. No giving out or locking.     Hypothyroidism Follow-up    Since last visit, patient describes the following symptoms: Weight stable, no hair loss, no skin changes, no constipation, no loose stools    COLD SORES  No current issue. Would like on hand.     HISTORY OF DVTS  Factor V Leiden. Under the care of hematology. Remains on xarelto and plan is to stay on that.  DVTs were not provoked.     Social History     Tobacco Use    Smoking status: Former     Packs/day: 0.00     Years: 5.00     Additional pack years: 0.00     Total pack years: 0.00      Types: Cigarettes     Start date: 9/8/2011     Quit date: 9/1/2020     Years since quitting: 3.1    Smokeless tobacco: Never    Tobacco comments:     Smoked infrequently but now haven't smoked in approximately a year.   Substance Use Topics    Alcohol use: Yes     Comment: occasional- 2-3 on weekends              10/29/2023     7:37 PM   Alcohol Use   Prescreen: >3 drinks/day or >7 drinks/week? No     Reviewed orders with patient.  Reviewed health maintenance and updated orders accordingly - Yes  BP Readings from Last 3 Encounters:   11/01/23 126/76   12/21/22 130/80   11/23/22 (!) 142/88    Wt Readings from Last 3 Encounters:   11/01/23 95.3 kg (210 lb)   01/12/23 101.6 kg (224 lb)   12/21/22 100.7 kg (222 lb)                    Breast Cancer Screening:    FHS-7:       10/22/2021     1:42 PM 8/1/2022     9:37 AM 10/29/2023     7:40 PM   Breast CA Risk Assessment (FHS-7)   Did any of your first-degree relatives have breast or ovarian cancer? No No No   Did any of your relatives have bilateral breast cancer? Unknown Unknown Unknown   Did any man in your family have breast cancer? No No No   Did any woman in your family have breast and ovarian cancer? No Yes Yes   Did any woman in your family have breast cancer before age 50 y? No No No   Do you have 2 or more relatives with breast and/or ovarian cancer? No No No   Do you have 2 or more relatives with breast and/or bowel cancer? No No No       Mammogram Screening - Offered annual screening and updated Health Maintenance for mutual plan based on risk factor consideration  Pertinent mammograms are reviewed under the imaging tab.    History of abnormal Pap smear: NO - age 30-65 PAP every 5 years with negative HPV co-testing recommended      Latest Ref Rng & Units 8/2/2022     7:06 AM 5/10/2017     1:05 PM 5/10/2017     9:28 AM   PAP / HPV   PAP  Negative for Intraepithelial Lesion or Malignancy (NILM)      PAP (Historical)    NIL    HPV 16 DNA Negative Negative  Negative  "    HPV 18 DNA Negative Negative  Negative     Other HR HPV Negative Negative  Negative       Reviewed and updated as needed this visit by clinical staff    Allergies  Meds              Reviewed and updated as needed this visit by Provider                     Review of Systems   Constitutional:  Negative for chills and fever.   HENT:  Negative for congestion, ear pain, hearing loss and sore throat.    Eyes:  Negative for pain and visual disturbance.   Respiratory:  Negative for cough and shortness of breath.    Cardiovascular:  Negative for chest pain, palpitations and peripheral edema.   Gastrointestinal:  Negative for abdominal pain, constipation, diarrhea, heartburn, hematochezia and nausea.   Breasts:  Negative for tenderness, breast mass and discharge.   Genitourinary:  Negative for dysuria, frequency, genital sores, hematuria, pelvic pain, urgency, vaginal bleeding and vaginal discharge.   Musculoskeletal:  Negative for arthralgias, joint swelling and myalgias.   Skin:  Negative for rash.   Neurological:  Negative for dizziness, weakness, headaches and paresthesias.   Psychiatric/Behavioral:  Negative for mood changes. The patient is not nervous/anxious.           OBJECTIVE:   /76   Pulse 74   Temp 98.6  F (37  C) (Temporal)   Resp 20   Ht 1.75 m (5' 8.9\")   Wt 95.3 kg (210 lb)   LMP 09/29/2023 (Approximate)   SpO2 100%   Breastfeeding No   BMI 31.10 kg/m    Physical Exam  GENERAL: healthy, alert and no distress  EYES: Eyes grossly normal to inspection, PERRL and conjunctivae and sclerae normal  HENT: ear canals and TM's normal, nose and mouth without ulcers or lesions  NECK: no adenopathy, no asymmetry, masses, or scars and thyroid normal to palpation  RESP: lungs clear to auscultation - no rales, rhonchi or wheezes  BREAST: normal without masses, tenderness or nipple discharge and no palpable axillary masses or adenopathy  CV: regular rate and rhythm, normal S1 S2, no S3 or S4, no murmur, " click or rub, no peripheral edema and peripheral pulses strong  ABDOMEN: soft, nontender, no hepatosplenomegaly, no masses and bowel sounds normal  MS: left knee - no effusion. No erythema.  Sensation altered to light touch on exam distal to patella. Full range of motion stable to varus and valgus stresses. Negative lachmans  SKIN: no suspicious lesions or rashes  NEURO: Normal strength and tone, mentation intact and speech normal  PSYCH: mentation appears normal, affect normal/bright    Diagnostic Test Results:  Labs reviewed in Epic    ASSESSMENT/PLAN:   (Z00.00) Routine general medical examination at a health care facility  (primary encounter diagnosis)  Comment: See counseling messages   Plan: recheck in one year.     (E03.9) Hypothyroidism, unspecified type  Comment: Doing well. Well controlled. Tolerating medication.  No change in plan.    Plan: levothyroxine (SYNTHROID/LEVOTHROID) 75 MCG         tablet            (B00.1) Recurrent cold sores  Comment: Doing well. Well controlled. Tolerating medication.  No change in plan.    Plan: acyclovir (ZOVIRAX) 400 MG tablet            (N92.3) Spotting between menses  Comment: new issue.  On xarelto. Periods are ok.   Recommend evaluation with ultrasound to rule out concerns.   Could be ovulatory bleeding. But is new pattern.   Plan: US Pelvic Complete with Transvaginal            (R20.9) Skin sensation disturbance  Comment: left knee after fall. Has been improving.   Plan: monitor. Expect improvement over time. Can take months.   Recheck if worsening or concerns.     (Z12.31) Encounter for screening mammogram for breast cancer  Comment: recommend  Plan: *MA Screening Digital Bilateral        Ordered.     (D68.51) Factor 5 Leiden mutation, heterozygous (H24)  Comment: continue plan of care from hematology            COUNSELING:  Reviewed preventive health counseling, as reflected in patient instructions       Regular exercise       Healthy diet/nutrition      BMI:  "  Estimated body mass index is 31.1 kg/m  as calculated from the following:    Height as of this encounter: 1.75 m (5' 8.9\").    Weight as of this encounter: 95.3 kg (210 lb).   Weight management plan: Discussed healthy diet and exercise guidelines      She reports that she quit smoking about 3 years ago. Her smoking use included cigarettes. She started smoking about 12 years ago. She has never used smokeless tobacco.          Sabrina Hdez MD  [facility]  "

## 2023-11-13 ENCOUNTER — ANCILLARY PROCEDURE (OUTPATIENT)
Dept: ULTRASOUND IMAGING | Facility: CLINIC | Age: 38
End: 2023-11-13
Attending: FAMILY MEDICINE
Payer: COMMERCIAL

## 2023-11-13 DIAGNOSIS — N92.0 SPOTTING BETWEEN MENSES: ICD-10-CM

## 2023-11-13 PROCEDURE — 76830 TRANSVAGINAL US NON-OB: CPT | Performed by: RADIOLOGY

## 2023-11-13 PROCEDURE — 76856 US EXAM PELVIC COMPLETE: CPT | Performed by: RADIOLOGY

## 2023-11-14 DIAGNOSIS — N94.89 ADNEXAL MASS: Primary | ICD-10-CM

## 2023-11-20 ENCOUNTER — OFFICE VISIT (OUTPATIENT)
Dept: OBGYN | Facility: CLINIC | Age: 38
End: 2023-11-20
Attending: GENERAL PRACTICE
Payer: COMMERCIAL

## 2023-11-20 VITALS — DIASTOLIC BLOOD PRESSURE: 94 MMHG | SYSTOLIC BLOOD PRESSURE: 149 MMHG | BODY MASS INDEX: 30.84 KG/M2 | WEIGHT: 208.2 LBS

## 2023-11-20 DIAGNOSIS — N94.89 ADNEXAL MASS: ICD-10-CM

## 2023-11-20 DIAGNOSIS — R19.00 PELVIC MASS: Primary | ICD-10-CM

## 2023-11-20 PROCEDURE — 99203 OFFICE O/P NEW LOW 30 MIN: CPT | Performed by: GENERAL PRACTICE

## 2023-11-20 NOTE — PROGRESS NOTES
Jackie is a 38 year old referred to GYN for pelvic mass.  Had been seen for annual physical and reproted spotting after menses which occurred for a few cycles. No spotting recently. Pelvic ultrasound was performed for the spotting which showed 11cm pelvic cystic mass. Unable to determine origin of mass. Patient feels well with no complaints. No pain. No constipation. No bloating. No early satiety. No night sweats. Has had some discomfort with intercourse. Patient on rivaroxaban due to recent blood clots. No other issues or concerns.     ROS: Ten point review of systems was reviewed and negative except the above.    GYNHx:   Patient's last menstrual period was 11/02/2023 (approximate).  Menses: Regular, monthly lasting 3-5 days. +Menorrhagia on blood thinners  Last paps:  8/2022  NILM/HPV-  Lab Results   Component Value Date    PAP NIL 05/10/2017    PAP NIL 02/11/2014     Prior abnormal pap: denies  History STI: denies         Past Surgical History:   Procedure Laterality Date    NO HISTORY OF SURGERY     WTE    Past Medical History:   Diagnosis Date    Factor 5 Leiden mutation, heterozygous 02/11/2014    Factor 5 Leiden mutation, heterozygous (H24)     Thyroid disease         Allergies   Allergen Reactions    Shellfish Allergy        Family History   Problem Relation Age of Onset    Heart Failure Father         CHF    Hypertension Father     Thyroid Disease Father     Thyroid Disease Father     Diabetes Father     Hypertension Brother     Hypertension Mother     Thyroid Disease Mother     Depression Mother     Thyroid Disease Mother     Hypertension Brother     Breast Cancer Paternal Grandmother              PE: BP (!) 149/94   Wt 94.4 kg (208 lb 3.2 oz)   LMP 11/02/2023 (Approximate)   BMI 30.84 kg/m    Body mass index is 30.84 kg/m .    General Appearance:  healthy, alert, active, no distress  Cardiovascular:  well perfused  Lungs:  non labored breathing  Abdomen: Benign, No masses, organomegaly, and Soft,  non-tender.   Pelvic: deferred      RADS  Narrative & Impression   EXAMINATION: US PELVIC TRANSABDOMINAL AND TRANSVAGINAL 11/13/2023 4:29  PM       CLINICAL HISTORY:  Spotting between menses     COMPARISON: None pertinent     PROCEDURE COMMENT: Both transabdominal and transvaginal imaging  performed of the pelvis with color Doppler.     FINDINGS:  The uterus measures  2.9 cm x 6.8 cm x 3.5 cm. No focal myometrial  mass. The uterus is heterogeneous in echotexture.     The endometrial stripe measures 10 mm.      The right ovary measures 1.6 cm x 3.0 cm x 2.2 cm. The left ovary  measures 1.7 cm x 2.8 cm x 2.7 cm. Normal ovarian morphology.      There is no simple free fluid in the pelvis.  In the region of the  left, anterior to the left portion of the uterus, there is a cystic  mass measuring 11.3 x 7.1 x 10.6 cm. No internal solid components.  Ultrasound does not demonstrate connection to either ovary or bladder.                                                                      IMPRESSION:  1. The uterus and ovaries are within normal limits.  2. Large cystic mass in the left adnexa without apparent connection to  the ovary or bladder by ultrasound. Differential includes neoplasm  arising from the fallopian tube or broad ligament; or embryological,  nonneoplastic lesion.  GYN consultation for management is recommended.             A/P:  37yo F with pelvic cystic structure of unknown etiology. Per radiology report likely arising from GYN structure. Cyst is otherwise simple in appearance. Will obtain a pelvic MRI to better delineate origin of cystic structure. Other possible etiology includes urinary tract or GI. Patient in agreement with plan of care. Follow up after MRI completed.       30 minutes spent on the date of the encounter doing chart review, review of test results, interpretation of tests, patient visit, and documentation         Sam Naqvi DO, FACOG

## 2023-12-01 ENCOUNTER — HOSPITAL ENCOUNTER (OUTPATIENT)
Dept: MRI IMAGING | Facility: CLINIC | Age: 38
Discharge: HOME | End: 2023-12-01
Attending: GENERAL PRACTICE | Admitting: GENERAL PRACTICE
Payer: COMMERCIAL

## 2023-12-01 DIAGNOSIS — R19.00 PELVIC MASS: ICD-10-CM

## 2023-12-01 PROCEDURE — 72197 MRI PELVIS W/O & W/DYE: CPT

## 2023-12-01 PROCEDURE — 255N000002 HC RX 255 OP 636: Mod: JZ | Performed by: GENERAL PRACTICE

## 2023-12-01 PROCEDURE — A9585 GADOBUTROL INJECTION: HCPCS | Mod: JZ | Performed by: GENERAL PRACTICE

## 2023-12-01 RX ORDER — GADOBUTROL 604.72 MG/ML
10 INJECTION INTRAVENOUS ONCE
Status: COMPLETED | OUTPATIENT
Start: 2023-12-01 | End: 2023-12-01

## 2023-12-01 RX ADMIN — GADOBUTROL 10 ML: 604.72 INJECTION INTRAVENOUS at 20:02

## 2023-12-08 DIAGNOSIS — R19.00 PELVIC MASS: Primary | ICD-10-CM

## 2023-12-10 ENCOUNTER — HEALTH MAINTENANCE LETTER (OUTPATIENT)
Age: 38
End: 2023-12-10

## 2023-12-11 DIAGNOSIS — R19.00 PELVIC MASS: Primary | ICD-10-CM

## 2023-12-12 ENCOUNTER — PATIENT OUTREACH (OUTPATIENT)
Dept: ONCOLOGY | Facility: CLINIC | Age: 38
End: 2023-12-12
Payer: COMMERCIAL

## 2023-12-12 DIAGNOSIS — R19.00 PELVIC MASS: Primary | ICD-10-CM

## 2023-12-12 NOTE — PROGRESS NOTES
"New Patient Hematology / Oncology Nurse Navigator Note     Referral Date: 12/11/23    Referring provider:   Sam Naqvi DO   [id number] [address] [place] [id number]   Phone: [phone]   Fax: [phone]       Referring Clinic/Organization: [facility]     Referred to: GynOnc    Requested provider (if applicable): First available - did not specify     Evaluation for : pelvic mass. \"37yo F with 11cm pelvic mass of unknown etiology. MRI noting mas is independent of uterus and ovaries. Gen Surg declined consult. Please eval for possible surgical management \"     Clinical History (per Nurse review of records provided):    11/13/23 Pelvic US:  IMPRESSION:  1. The uterus and ovaries are within normal limits.  2. Large cystic mass in the left adnexa without apparent connection to  the ovary or bladder by ultrasound. Differential includes neoplasm  arising from the fallopian tube or broad ligament; or embryological,  nonneoplastic lesion.  GYN consultation for management is recommended.-- BOOKMARKED  12/1 MRI Pelvis:  IMPRESSION:  1.  There is a large unilocular cystic mass in the midline pelvis which contains proteinaceous fluid and a small amount of layering debris. No suspicious features to suggest malignancy. The mass is separate from the ovaries and of unclear origin.   Surgical consultation recommended. -- BOOKMARKED  11/20/23 Office Visit with OBGYN-- BOOKMARKED  8/20/22 PAP/HPV--BOOKMARKED    Clinical Assessment / Barriers to Care (Per Nurse):  Pt lives in [place], [place]    Records Location: [facility]     Records Needed:   N/A    Additional testing needed prior to consult:   Will enter  lab order and arrange lab appointment after consult with Dr. Stover tomorrow. IB to Dr. Stover for input on lab orders, will try to move up lab appt to today pending input from Dr. Ann.    Referral updates and Plan:   OUTGOING CALL to pt, no answer.     LVM introducing my role as nurse navigator with [facility] " and that we have recd the referral to GynOnc from Dr. Naqvi.    Requested call back to discuss further.     [place]. Stover [place] [facility] [place] [facility] IB to Dr. Stover to advise on labs prior.      Addendum: Patient returned call.   Introduced my role as nurse navigator with [facility] Hematology/Oncology dept and that we have recd the referral to GynOn from Dr Naqvi.  Pt confirms they are aware of the referral and ready to schedule. Explained we will arrange lab appointment with her visit with Dr. Ann and may try to move up labs sooner pending input from Dr. Ann. Pt agrees with plan.   Provided contact information if future questions arise.     -Transferred pt to NPS line [phone] to schedule appt per scheduling instructions.      Kirstie Chung, BSN, RN, PHN, OCN  Hematology/Oncology Nurse Navigator  [facility]  [phone]

## 2023-12-12 NOTE — PROGRESS NOTES
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    Per Chart Review: Patient spotting between menses leading to pelvic ultrasound.  Ultrasound showed 11 cm cystic mass. Not clearly connected to the ovaries so she underwent MRI to try to determine etiology.  An MRI did not show connection to any obvious structure.  Cyst is simple with no nodularity.    Overall she does not note any significant symptoms.  No pelvic or abdominal pain.  Has regular menses.  No desire for future fertility.  Her partner has a vasectomy.    In regards to her clot history, her mother had a known factor V Leiden mutation which is why she was tested.  She personally has had a history of 2 DVTs the most recent last year.  The plan is for long-term anticoagulation and she is currently on rivaroxaban.      OB/Gynecologic History:  g0  Regular menses.   Cervical cancer screening: no history of abnormal Pap smears. Last 2022: Negative/HPV negative      Past Medical History:   Diagnosis Date    Deep vein thrombosis (H)     x 2    Factor 5 Leiden mutation, heterozygous 2014    Kawasaki disease (H)     Thyroid disease        Past Surgical History:   Procedure Laterality Date    NO HISTORY OF SURGERY      WISDOM TEETH EXTRACTION          Social History     Tobacco Use    Smoking status: Former     Packs/day: 0.00     Years: 5.00     Additional pack years: 0.00     Total pack years: 0.00     Types: Cigarettes     Start date: 2011     Quit date: 2020     Years since quitting: 3.2    Smokeless tobacco: Never    Tobacco comments:      "Smoked infrequently but now haven't smoked in approximately a year.   Vaping Use    Vaping Use: Never used   Substance Use Topics    Alcohol use: Yes     Comment: occasional- 2-3 on weekends     Drug use: Not Currently     Types: Marijuana     Comment: Infrequently, once every few months       Family History   Problem Relation Age of Onset    Hypertension Mother     Thyroid Disease Mother     Depression Mother     Thyroid Disease Mother     Heart Failure Father         CHF    Hypertension Father     Thyroid Disease Father     Thyroid Disease Father     Diabetes Father     Hypertension Brother     Hypertension Brother     Breast Cancer Paternal Grandmother 80       Current Outpatient Medications   Medication    acyclovir (ZOVIRAX) 400 MG tablet    levothyroxine (SYNTHROID/LEVOTHROID) 75 MCG tablet    rivaroxaban ANTICOAGULANT (XARELTO ANTICOAGULANT) 20 MG TABS tablet     No current facility-administered medications for this visit.       Allergies  Allergies   Allergen Reactions    Shellfish Allergy        Current Outpatient Medications   Medication    acyclovir (ZOVIRAX) 400 MG tablet    levothyroxine (SYNTHROID/LEVOTHROID) 75 MCG tablet    rivaroxaban ANTICOAGULANT (XARELTO ANTICOAGULANT) 20 MG TABS tablet     No current facility-administered medications for this visit.       Physical Exam:   /85 (BP Location: Right arm, Patient Position: Sitting, Cuff Size: Adult Large)   Pulse 65   Temp 98.8  F (37.1  C) (Tympanic)   Resp 16   Ht 1.761 m (5' 9.33\")   Wt 92.6 kg (204 lb 1.6 oz)   LMP 11/02/2023 (Approximate)   SpO2 100%   BMI 29.85 kg/m    General: no acute distress  CV: RRR  Lungs: CTAB    Labs/Pathology:   Reviewed CBC,CMP with no notable abnormalities   17.    Imaging:   MR Pelvis (GYN) wo & w Contrast  Narrative: EXAM: MR PELVIS (GYN) W/O and W CONTRAST  LOCATION: [facility]  DATE: 12/1/2023    INDICATION: 37yo F with 11cm pelvic mass noted on recent US. " Etiology of cystic mass is unknown. Please evaluate for origin of mass.  COMPARISON: Pelvic ultrasound 11/13/2023  TECHNIQUE: Routine MRI female pelvis protocol including T1 in/out phase, diffusion, thin section high resolution multiplane T2, and post contrast T1.  CONTRAST: 10 mL Gadavist intravenous    FINDINGS:     UTERUS: No masses.    ENDOMETRIUM: 9 mm in thickness. The junctional zone is within normal limits.    ADNEXA: The bilateral ovaries are normal.    ADDITIONAL FINDINGS: There is a large unilocular cystic mass with a smooth thin wall in the midline pelvis anterior to the uterus and superior to the bladder, measuring 11.8 x 10.3 x 9.5 cm. The internal fluid appears slightly hyperintense on T1-weighted   imaging consistent with proteinaceous content. There is a small amount of debris within the dependent portion of the cyst, possibly calcifications. No solid enhancing components.    Trace physiologic free fluid in the pelvic cul-de-sac. No lymphadenopathy. No suspicious osseous lesions.  Impression: IMPRESSION:  1.  There is a large unilocular cystic mass in the midline pelvis which contains proteinaceous fluid and a small amount of layering debris. No suspicious features to suggest malignancy. The mass is separate from the ovaries and of unclear origin.   Surgical consultation recommended.    EXAMINATION: US PELVIC TRANSABDOMINAL AND TRANSVAGINAL 11/13/2023 4:29  PM       CLINICAL HISTORY:  Spotting between menses     COMPARISON: None pertinent     PROCEDURE COMMENT: Both transabdominal and transvaginal imaging  performed of the pelvis with color Doppler.     FINDINGS:  The uterus measures  2.9 cm x 6.8 cm x 3.5 cm. No focal myometrial  mass. The uterus is heterogeneous in echotexture.     The endometrial stripe measures 10 mm.      The right ovary measures 1.6 cm x 3.0 cm x 2.2 cm. The left ovary  measures 1.7 cm x 2.8 cm x 2.7 cm. Normal ovarian morphology.      There is no simple free fluid in the  pelvis.  In the region of the  left, anterior to the left portion of the uterus, there is a cystic  mass measuring 11.3 x 7.1 x 10.6 cm. No internal solid components.  Ultrasound does not demonstrate connection to either ovary or bladder.                                                                      IMPRESSION:  1. The uterus and ovaries are within normal limits.  2. Large cystic mass in the left adnexa without apparent connection to  the ovary or bladder by ultrasound. Differential includes neoplasm  arising from the fallopian tube or broad ligament; or embryological,  nonneoplastic lesion.  GYN consultation for management is recommended.     AN MD SHAYNE        Assessment:  Jackie Juan is a 38 year old patient with medical history notable for factor V Leiden mutation and history of blood clots on chronic anticoagulation who presents due to newly diagnosed pelvic cyst of unclear etiology.    Plan:   Pelvic cyst: We reviewed her imaging which shows a large unilocular cyst with no solid or nodular areas. These are almost universally benign. The most likely etiology is fallopian tube or peritoneal inclusion cyst, although it could also be a cystic remnant of embryological development (cloacal cysts or Wolffian duct cysts). We discussed options of observation versus diagnostic laparoscopy with excision.     - Biggest risk factor for excision is related to her FVL mutation and history of blood clots. We discussed that surgery increases the risk of blood clots and for those on therapeutic anticoagulation there is an increased risk of bleeding both at the time of surgery but moreso in the week following.    -I would recommend diagnostic laparoscopy which could include removal of GYN structures if it is attached to any of these.  I think the most likely is a paratubal cyst.  We did discuss option of opportunistic bilateral salpingectomy at that time.  This can act both as permanent contraception as well as risk  reduction as it decreases the risk of ovarian cancer and she is interested in proceed with bilateral salpingectomy at time of surgery.     - She does have AUB-HMB, but given high risk for post-op complications/bleeding, I wouldn't recommend hysterectomy unless she has failed other non-surgical options given she hasn't had heavy bleeding or required transfusions.     - Luckily resection of this cyst is likely to be a low risk bleeding procedure, so we would not need to interrupt her anticoagulation for very long. Recommended checking with hematologist regarding any need for bridging of her anticoagulation. Otherwise we would plan to stop 1 day prior to surgery and resume likely within 24 hours after surgery if procedure goes well.     Pre-operative exam: EKG done today and normal sinus.     I spent a total of 60 minutes on the care of Jackie Juan on the day of service including face to face time, care coordination, and documentation on the day of service.     Antoni Stover MD     Gynecologic Oncology

## 2023-12-13 ENCOUNTER — LAB (OUTPATIENT)
Dept: LAB | Facility: CLINIC | Age: 38
End: 2023-12-13
Attending: OBSTETRICS & GYNECOLOGY
Payer: COMMERCIAL

## 2023-12-13 ENCOUNTER — ONCOLOGY VISIT (OUTPATIENT)
Dept: ONCOLOGY | Facility: CLINIC | Age: 38
End: 2023-12-13
Attending: GENERAL PRACTICE
Payer: COMMERCIAL

## 2023-12-13 ENCOUNTER — ANCILLARY PROCEDURE (OUTPATIENT)
Dept: MAMMOGRAPHY | Facility: CLINIC | Age: 38
End: 2023-12-13
Attending: FAMILY MEDICINE
Payer: COMMERCIAL

## 2023-12-13 ENCOUNTER — PATIENT OUTREACH (OUTPATIENT)
Dept: ONCOLOGY | Facility: CLINIC | Age: 38
End: 2023-12-13

## 2023-12-13 ENCOUNTER — PRE VISIT (OUTPATIENT)
Dept: ONCOLOGY | Facility: CLINIC | Age: 38
End: 2023-12-13

## 2023-12-13 VITALS
DIASTOLIC BLOOD PRESSURE: 85 MMHG | WEIGHT: 204.1 LBS | RESPIRATION RATE: 16 BRPM | OXYGEN SATURATION: 100 % | TEMPERATURE: 98.8 F | HEIGHT: 69 IN | BODY MASS INDEX: 30.23 KG/M2 | SYSTOLIC BLOOD PRESSURE: 123 MMHG | HEART RATE: 65 BPM

## 2023-12-13 DIAGNOSIS — R19.00 PELVIC MASS: ICD-10-CM

## 2023-12-13 DIAGNOSIS — Z12.31 ENCOUNTER FOR SCREENING MAMMOGRAM FOR BREAST CANCER: ICD-10-CM

## 2023-12-13 DIAGNOSIS — R19.00 PELVIC MASS: Primary | ICD-10-CM

## 2023-12-13 LAB — CANCER AG125 SERPL-ACNC: 17 U/ML

## 2023-12-13 PROCEDURE — 36415 COLL VENOUS BLD VENIPUNCTURE: CPT | Performed by: PATHOLOGY

## 2023-12-13 PROCEDURE — 77067 SCR MAMMO BI INCL CAD: CPT | Performed by: RADIOLOGY

## 2023-12-13 PROCEDURE — 93005 ELECTROCARDIOGRAM TRACING: CPT | Performed by: OBSTETRICS & GYNECOLOGY

## 2023-12-13 PROCEDURE — 99213 OFFICE O/P EST LOW 20 MIN: CPT | Performed by: OBSTETRICS & GYNECOLOGY

## 2023-12-13 PROCEDURE — 99000 SPECIMEN HANDLING OFFICE-LAB: CPT | Performed by: PATHOLOGY

## 2023-12-13 PROCEDURE — 93005 ELECTROCARDIOGRAM TRACING: CPT | Mod: RTG

## 2023-12-13 PROCEDURE — 86304 IMMUNOASSAY TUMOR CA 125: CPT | Performed by: OBSTETRICS & GYNECOLOGY

## 2023-12-13 PROCEDURE — 99205 OFFICE O/P NEW HI 60 MIN: CPT | Performed by: OBSTETRICS & GYNECOLOGY

## 2023-12-13 PROCEDURE — 93010 ELECTROCARDIOGRAM REPORT: CPT | Performed by: INTERNAL MEDICINE

## 2023-12-13 RX ORDER — HEPARIN SODIUM 5000 [USP'U]/.5ML
5000 INJECTION, SOLUTION INTRAVENOUS; SUBCUTANEOUS
Status: CANCELLED | OUTPATIENT
Start: 2023-12-13

## 2023-12-13 ASSESSMENT — PAIN SCALES - GENERAL: PAINLEVEL: NO PAIN (0)

## 2023-12-13 ASSESSMENT — ACTIVITIES OF DAILY LIVING (ADL): DEPENDENT_IADLS:: INDEPENDENT

## 2023-12-13 NOTE — PATIENT INSTRUCTIONS
It was a pleasure seeing you in clinic today-please reach out if there are any further questions that arise following today's visit.  During business hours, you may reach me at [phone].  For urgent/emergent questions after business hours, you may reach the on-call Gynecologic Oncology Resident through the [facility]  at [phone].    All normal test results are usually communicated via letter or Aleahart message.  Abnormal results (those that require a change in the previously discussed plan of care) are usually communicated via a phone call.    I recommend signing up for [employer] access if you have not already done so.  This allows you online access to your lab results and also helps you communicate efficiently with your clinic should any questions arise in your care.        You will be/have been scheduled for surgery     Diagnosis:  Ovarian mass    The surgical procedure is: Single-incision laparoscopic bilateral salpingectomy, removal of pelvic cyst - Bilateral     Anticipated length of surgery: .  You will be in the recovery room for approximately 2-4hrs after surgery.      You will be going home the same day  At discharge you will need a someone to drive you home.  You will need someone to stay with you for the first 24 hours you are home.    Preparation for Surgery:    To Schedule      *  EKG done today               *  no need for     preoperative clearance for surgery             *  No solid foods or milk products 10 hours before your surgery time, you can  have clear liquids up until 4 hours before your surgery time.    Postoperative Restrictions:  No heavy lifting >20 lbs or strenuous              activity                        nothing in the vagina (no tampons, no intercourse, no douching) for eight weeks.     Postoperative plan  Decreased appetite is normal, plan to eat 6-8 small meal day, drink at least 6-8 glasses of water per day, there are no dietary restrictions.   Take  stool softener daily as directed  for at least 1-2 weeks unless you develop diarrhea.  Activity as tolerated, reminder to balance rest with activity as you will tire easily while recovering. Using stairs is ok. Walk as much as tolerated, increasing your activity every day.   You may shower the day after surgery, your incision site can get wet/soapy, pat dry.    You will be given ibuprofen and tylenol, take on schedule every 6 hours.  Do not set an alarm to wake yourself up to take the pain medications.  Take consistently for a week  then you can decrease/stop as tolerated.  You will also be given a small dose of narcotics, you may take this in addition to the tylenol/ibuprofen as needed if the pain is not manageable.  do not take this on a schedule.    Ok to resume driving a week or so after surgery after discontinue of narcotics  Wound care:  you may cover your incision sites if there is drainage or clothing is causing irritation otherwise leave open to the air.  No need to put an creams/ointments on incision site.  Wound will be mildly pink and might have a firm ridge underneath this is normal and will resolve in 4-6 weeks      Call clinic if you have fever greater than 100.5, heavy vaginal bleeding, persistent nausea/vomiting, increasing redness, pain, swelling at incision site, drainage with bad odor or other concerns.      You should be feeling better every day.    Postoperative visit:  Return to clinic 1-2 weeks after surgery for post operative visit.  Will discuss your pathology results and any needed follow up plan at that visit.      Please contact the clinic with any questions or concerns.      Dr Wali Shoemaker RN  Phone:  [phone]  Fax:  [phone]

## 2023-12-13 NOTE — NURSING NOTE
"Oncology Rooming Note    December 13, 2023 8:03 AM   Jackie Juan is a 38 year old female who presents for:    Chief Complaint   Patient presents with    Oncology Clinic Visit     Pelvic mass     Initial Vitals: /85 (BP Location: Right arm, Patient Position: Sitting, Cuff Size: Adult Large)   Pulse 65   Temp 98.8  F (37.1  C) (Tympanic)   Resp 16   Ht 1.761 m (5' 9.33\")   Wt 92.6 kg (204 lb 1.6 oz)   LMP 11/02/2023 (Approximate)   SpO2 100%   BMI 29.85 kg/m   Estimated body mass index is 29.85 kg/m  as calculated from the following:    Height as of this encounter: 1.761 m (5' 9.33\").    Weight as of this encounter: 92.6 kg (204 lb 1.6 oz). Body surface area is 2.13 meters squared.  No Pain (0) Comment: Data Unavailable   Patient's last menstrual period was 11/02/2023 (approximate).  Allergies reviewed: Yes  Medications reviewed: Yes    Medications: Medication refills not needed today.  Pharmacy name entered into [email or web address]:    [id number] [place] - [id number]  [facility] [place] [address]    Clinical concerns:  none      Daphne Monroy              "

## 2023-12-13 NOTE — NURSING NOTE
Pre Op Nurse Teaching Template    Relevant Diagnosis: pelvic mass    Teaching Topic: Single-incision laparoscopic bilateral salpingectomy, removal of pelvic cyst - Bilateral     Person(s) involved in teaching :  Patient  Motivation Level:  Asks Questions:    Yes      Eager to Learn:     Yes     Cooperative:          Yes    Receptive (willing. Able to accept information):    Yes      Patient and those who are listed above demonstrates understanding of the following:   Reason for the appointment, diagnosis and treatment plan:   Yes   Knowledge of proper use of medications and conditions for which they are ordered (with special attention to potential side effects or drug interactions): Yes   Which situations necessitate calling provider and whom to contact: Yes         Nutritional needs and diet plan:  Yes      Pain management techniques:     Yes, Pain Scale   Diet:   Yes    Teaching Concerns addressed: Yes    Infection Prevention:  Patient and those who are listed above demonstrate understanding of the following:  Pre-Op CHG Bathing Instructions: Yes  Surgical procedure site care taught:   Yes   Signs and symptoms of infection taught: Yes       Instructional Materials Used/Given:  The Before Your Surgery Booklet  Showering before Surgery instructions & CHG Product  Pain Assessment Tool   Home Care after Surgery  Eating after surgery  Map  Accommodations Brochure  Phone numbers for clinic and on call doctor  Copy of Surgical Consent    Done Today:  Lab work, EKG,   Preop Visit   not needed  done per maribel 12/13/23    Tests Ordered:  Post Op Visit Scheduled:TBD  Comments:    Surgery date/time:TBD        Consent signed via IPAD and on file in media  Hysterectomy consent signed and sent to scanning  .

## 2023-12-13 NOTE — LETTER
2023         RE: Jackie Juan  [id number] [address] [address]        Dear Colleague,    Thank you for referring your patient, Jackie Juan, to the [facility]. Please see a copy of my visit note below.    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    Per Chart Review: Patient spotting between menses leading to pelvic ultrasound.  Ultrasound showed 11 cm cystic mass. Not clearly connected to the ovaries so she underwent MRI to try to determine etiology.  An MRI did not show connection to any obvious structure.  Cyst is simple with no nodularity.    Overall she does not note any significant symptoms.  No pelvic or abdominal pain.  Has regular menses.  No desire for future fertility.  Her partner has a vasectomy.    In regards to her clot history, her mother had a known factor V Leiden mutation which is why she was tested.  She personally has had a history of 2 DVTs the most recent last year.  The plan is for long-term anticoagulation and she is currently on rivaroxaban.      OB/Gynecologic History:  g0  Regular menses.   Cervical cancer screening: no history of abnormal Pap smears. Last 2022: Negative/HPV negative      Past Medical History:   Diagnosis Date    Deep vein thrombosis (H)     x 2    Factor 5 Leiden mutation, heterozygous 2014    Kawasaki disease (H)     Thyroid disease        Past Surgical History:   Procedure Laterality Date    NO HISTORY OF SURGERY      WISDOM TEETH EXTRACTION          Social History     Tobacco Use    Smoking status: Former      "Packs/day: 0.00     Years: 5.00     Additional pack years: 0.00     Total pack years: 0.00     Types: Cigarettes     Start date: 9/8/2011     Quit date: 9/1/2020     Years since quitting: 3.2    Smokeless tobacco: Never    Tobacco comments:     Smoked infrequently but now haven't smoked in approximately a year.   Vaping Use    Vaping Use: Never used   Substance Use Topics    Alcohol use: Yes     Comment: occasional- 2-3 on weekends     Drug use: Not Currently     Types: Marijuana     Comment: Infrequently, once every few months       Family History   Problem Relation Age of Onset    Hypertension Mother     Thyroid Disease Mother     Depression Mother     Thyroid Disease Mother     Heart Failure Father         CHF    Hypertension Father     Thyroid Disease Father     Thyroid Disease Father     Diabetes Father     Hypertension Brother     Hypertension Brother     Breast Cancer Paternal Grandmother 80       Current Outpatient Medications   Medication    acyclovir (ZOVIRAX) 400 MG tablet    levothyroxine (SYNTHROID/LEVOTHROID) 75 MCG tablet    rivaroxaban ANTICOAGULANT (XARELTO ANTICOAGULANT) 20 MG TABS tablet     No current facility-administered medications for this visit.       Allergies  Allergies   Allergen Reactions    Shellfish Allergy        Current Outpatient Medications   Medication    acyclovir (ZOVIRAX) 400 MG tablet    levothyroxine (SYNTHROID/LEVOTHROID) 75 MCG tablet    rivaroxaban ANTICOAGULANT (XARELTO ANTICOAGULANT) 20 MG TABS tablet     No current facility-administered medications for this visit.       Physical Exam:   /85 (BP Location: Right arm, Patient Position: Sitting, Cuff Size: Adult Large)   Pulse 65   Temp 98.8  F (37.1  C) (Tympanic)   Resp 16   Ht 1.761 m (5' 9.33\")   Wt 92.6 kg (204 lb 1.6 oz)   LMP 11/02/2023 (Approximate)   SpO2 100%   BMI 29.85 kg/m    General: no acute distress  CV: RRR  Lungs: CTAB    Labs/Pathology:   Reviewed CBC,CMP with no notable abnormalities   " 17.    Imaging:   MR Pelvis (GYN) wo & w Contrast  Narrative: EXAM: MR PELVIS (GYN) W/O and W CONTRAST  LOCATION: [facility]  DATE: 12/1/2023    INDICATION: 39yo F with 11cm pelvic mass noted on recent US. Etiology of cystic mass is unknown. Please evaluate for origin of mass.  COMPARISON: Pelvic ultrasound 11/13/2023  TECHNIQUE: Routine MRI female pelvis protocol including T1 in/out phase, diffusion, thin section high resolution multiplane T2, and post contrast T1.  CONTRAST: 10 mL Gadavist intravenous    FINDINGS:     UTERUS: No masses.    ENDOMETRIUM: 9 mm in thickness. The junctional zone is within normal limits.    ADNEXA: The bilateral ovaries are normal.    ADDITIONAL FINDINGS: There is a large unilocular cystic mass with a smooth thin wall in the midline pelvis anterior to the uterus and superior to the bladder, measuring 11.8 x 10.3 x 9.5 cm. The internal fluid appears slightly hyperintense on T1-weighted   imaging consistent with proteinaceous content. There is a small amount of debris within the dependent portion of the cyst, possibly calcifications. No solid enhancing components.    Trace physiologic free fluid in the pelvic cul-de-sac. No lymphadenopathy. No suspicious osseous lesions.  Impression: IMPRESSION:  1.  There is a large unilocular cystic mass in the midline pelvis which contains proteinaceous fluid and a small amount of layering debris. No suspicious features to suggest malignancy. The mass is separate from the ovaries and of unclear origin.   Surgical consultation recommended.    EXAMINATION: US PELVIC TRANSABDOMINAL AND TRANSVAGINAL 11/13/2023 4:29  PM       CLINICAL HISTORY:  Spotting between menses     COMPARISON: None pertinent     PROCEDURE COMMENT: Both transabdominal and transvaginal imaging  performed of the pelvis with color Doppler.     FINDINGS:  The uterus measures  2.9 cm x 6.8 cm x 3.5 cm. No focal myometrial  mass. The uterus is heterogeneous  in echotexture.     The endometrial stripe measures 10 mm.      The right ovary measures 1.6 cm x 3.0 cm x 2.2 cm. The left ovary  measures 1.7 cm x 2.8 cm x 2.7 cm. Normal ovarian morphology.      There is no simple free fluid in the pelvis.  In the region of the  left, anterior to the left portion of the uterus, there is a cystic  mass measuring 11.3 x 7.1 x 10.6 cm. No internal solid components.  Ultrasound does not demonstrate connection to either ovary or bladder.                                                                      IMPRESSION:  1. The uterus and ovaries are within normal limits.  2. Large cystic mass in the left adnexa without apparent connection to  the ovary or bladder by ultrasound. Differential includes neoplasm  arising from the fallopian tube or broad ligament; or embryological,  nonneoplastic lesion.  GYN consultation for management is recommended.     AN MD SHAYNE        Assessment:  Jackie Juan is a 38 year old patient with medical history notable for factor V Leiden mutation and history of blood clots on chronic anticoagulation who presents due to newly diagnosed pelvic cyst of unclear etiology.    Plan:   Pelvic cyst: We reviewed her imaging which shows a large unilocular cyst with no solid or nodular areas. These are almost universally benign. The most likely etiology is fallopian tube or peritoneal inclusion cyst, although it could also be a cystic remnant of embryological development (cloacal cysts or Wolffian duct cysts). We discussed options of observation versus diagnostic laparoscopy with excision.     - Biggest risk factor for excision is related to her FVL mutation and history of blood clots. We discussed that surgery increases the risk of blood clots and for those on therapeutic anticoagulation there is an increased risk of bleeding both at the time of surgery but moreso in the week following.    -I would recommend diagnostic laparoscopy which could include removal of GYN  structures if it is attached to any of these.  I think the most likely is a paratubal cyst.  We did discuss option of opportunistic bilateral salpingectomy at that time.  This can act both as permanent contraception as well as risk reduction as it decreases the risk of ovarian cancer and she is interested in proceed with bilateral salpingectomy at time of surgery.     - She does have AUB-HMB, but given high risk for post-op complications/bleeding, I wouldn't recommend hysterectomy unless she has failed other non-surgical options given she hasn't had heavy bleeding or required transfusions.     - Luckily resection of this cyst is likely to be a low risk bleeding procedure, so we would not need to interrupt her anticoagulation for very long. Recommended checking with hematologist regarding any need for bridging of her anticoagulation. Otherwise we would plan to stop 1 day prior to surgery and resume likely within 24 hours after surgery if procedure goes well.     Pre-operative exam: EKG done today and normal sinus.     I spent a total of 60 minutes on the care of Jackie Juan on the day of service including face to face time, care coordination, and documentation on the day of service.     Antoni Stover MD     Gynecologic Oncology

## 2023-12-14 ENCOUNTER — TELEPHONE (OUTPATIENT)
Dept: ONCOLOGY | Facility: CLINIC | Age: 38
End: 2023-12-14
Payer: COMMERCIAL

## 2023-12-14 LAB
ATRIAL RATE - MUSE: 63 BPM
DIASTOLIC BLOOD PRESSURE - MUSE: NORMAL MMHG
INTERPRETATION ECG - MUSE: NORMAL
P AXIS - MUSE: 65 DEGREES
PR INTERVAL - MUSE: 168 MS
QRS DURATION - MUSE: 66 MS
QT - MUSE: 412 MS
QTC - MUSE: 421 MS
R AXIS - MUSE: 25 DEGREES
SYSTOLIC BLOOD PRESSURE - MUSE: NORMAL MMHG
T AXIS - MUSE: 41 DEGREES
VENTRICULAR RATE- MUSE: 63 BPM

## 2023-12-14 NOTE — TELEPHONE ENCOUNTER
Patient is schedule for surgery with: Dr. Stover    Surgery Date: 1/26     Location: [facility]    H&P: already completed by surgeon will complete and/or update on day of surgery as needed      Post-op: will be scheduled by the clinic    Patient will receive a phone call from pre-admission nurses 1-2 days prior to surgery with arrival time and NPO instructions.    Patient aware times are subject to change up until day before surgery.     Patient questions/concerns: N/A     Surgery packet was provided to patient during appointment      Sneha Werner on 12/14/2023 at 8:30 AM

## 2024-01-03 ENCOUNTER — E-VISIT (OUTPATIENT)
Dept: URGENT CARE | Facility: CLINIC | Age: 39
End: 2024-01-03
Payer: COMMERCIAL

## 2024-01-03 DIAGNOSIS — T15.90XA FOREIGN BODY IN EYE, UNSPECIFIED LATERALITY, INITIAL ENCOUNTER: Primary | ICD-10-CM

## 2024-01-03 PROCEDURE — 99207 PR NON-BILLABLE SERV PER CHARTING: CPT | Performed by: PHYSICIAN ASSISTANT

## 2024-01-03 NOTE — PATIENT INSTRUCTIONS
Dear Jackie Juan,    We are sorry you are not feeling well. Based on the responses you provided, it is recommended that you be seen in-person in urgent care so we can better evaluate your symptoms. Please click here to find the nearest urgent care location to you.   You will not be charged for this Visit. Thank you for trusting us with your care.    Jozef Vaughn PA-C

## 2024-01-23 ENCOUNTER — ANESTHESIA EVENT (OUTPATIENT)
Dept: SURGERY | Facility: CLINIC | Age: 39
End: 2024-01-23
Payer: COMMERCIAL

## 2024-01-25 ASSESSMENT — LIFESTYLE VARIABLES: TOBACCO_USE: 1

## 2024-01-25 NOTE — ANESTHESIA PREPROCEDURE EVALUATION
Anesthesia Pre-Procedure Evaluation    Patient: Jackie Juan   MRN: [id number] : 1985        Procedure : Procedure(s):  Single-incision laparoscopic bilateral salpingectomy, removal of pelvic cyst          Past Medical History:   Diagnosis Date    Deep vein thrombosis (H)     x 2    Factor 5 Leiden mutation, heterozygous 2014    Kawasaki disease (H)     Thyroid disease       Past Surgical History:   Procedure Laterality Date    NO HISTORY OF SURGERY      WISDOM TEETH EXTRACTION        Allergies   Allergen Reactions    Shellfish Allergy       Social History     Tobacco Use    Smoking status: Former     Packs/day: 0.00     Years: 5.00     Additional pack years: 0.00     Total pack years: 0.00     Types: Cigarettes     Start date: 2011     Quit date: 2020     Years since quitting: 3.4    Smokeless tobacco: Never    Tobacco comments:     Smoked infrequently but now haven't smoked in approximately a year.   Substance Use Topics    Alcohol use: Yes     Comment: occasional- 2-3 on weekends       Wt Readings from Last 1 Encounters:   23 92.6 kg (204 lb 1.6 oz)        Anesthesia Evaluation            ROS/MED HX  ENT/Pulmonary:     (+)                tobacco use, Past use,                    (-) asthma, sleep apnea and recent URI   Neurologic:     (+)    no peripheral neuropathy                         (-) no seizures, no CVA, no TIA and migraines   Cardiovascular:     (+)  - -   -  - -   Taking blood thinners                              Previous cardiac testing   Echo: Date: Results:    Stress Test:  Date: Results:    ECG Reviewed:  Date: 2023 Results:  Sinus rhythm  Cath:  Date: Results:   (-) hypertension, MOTT and orthopnea/PND   METS/Exercise Tolerance:     Hematologic: Comments: Factor 5 Leiden mutation, heterozygous (H24)    (+) History of blood clots,    pt is anticoagulated,           Musculoskeletal:       GI/Hepatic:    (-) GERD, hiatal hernia and esophageal disease  "  Renal/Genitourinary:       Endo:     (+)          thyroid problem, hypothyroidism,           Psychiatric/Substance Use:       Infectious Disease:       Malignancy:       Other:            Physical Exam    Airway        Mallampati: I   TM distance: > 3 FB   Neck ROM: full   Mouth opening: > 3 cm    Respiratory Devices and Support         Dental       (+) Completely normal teeth      Cardiovascular          Rhythm and rate: regular and normal     Pulmonary           breath sounds clear to auscultation           OUTSIDE LABS:  CBC:   Lab Results   Component Value Date    WBC 4.7 07/21/2023    WBC 5.5 06/17/2022    HGB 13.5 07/21/2023    HGB 14.3 06/17/2022    HCT 39.7 07/21/2023    HCT 42.4 06/17/2022     07/21/2023     06/17/2022     BMP:   Lab Results   Component Value Date     07/21/2023     06/17/2022    POTASSIUM 4.3 07/21/2023    POTASSIUM 4.2 06/17/2022    CHLORIDE 105 07/21/2023    CHLORIDE 106 06/17/2022    CO2 21 (L) 07/21/2023    CO2 28 06/17/2022    BUN 17.1 07/21/2023    BUN 13 06/17/2022    CR 0.85 07/21/2023    CR 0.80 06/17/2022    GLC 94 07/21/2023    GLC 96 06/17/2022     COAGS:   Lab Results   Component Value Date    PTT 30 08/31/2021    INR 1.00 08/31/2021     POC: No results found for: \"BGM\", \"HCG\", \"HCGS\"  HEPATIC:   Lab Results   Component Value Date    ALBUMIN 4.2 07/21/2023    PROTTOTAL 7.2 07/21/2023    ALT 10 07/21/2023    AST 18 07/21/2023    ALKPHOS 51 07/21/2023    BILITOTAL 0.7 07/21/2023     OTHER:   Lab Results   Component Value Date    MATY 9.2 07/21/2023    TSH 3.29 06/22/2023    T4 1.08 04/19/2016       Anesthesia Plan    ASA Status:  2    NPO Status:  NPO Appropriate    Anesthesia Type: General.     - Airway: ETT   Induction: Intravenous.   Maintenance: Balanced.   Techniques and Equipment:     - Lines/Monitors: 2nd IV, BIS     Consents    Anesthesia Plan(s) and associated risks, benefits, and realistic alternatives discussed. Questions answered and " patient/representative(s) expressed understanding.     - Discussed: Risks, Benefits and Alternatives for BOTH SEDATION and the PROCEDURE were discussed     - Discussed with:  Patient            Postoperative Care    Pain management: IV analgesics, Oral pain medications, Multi-modal analgesia.   PONV prophylaxis: Ondansetron (or other 5HT-3), Dexamethasone or Solumedrol     Comments:               Charline Vera MD    I have reviewed the pertinent notes and labs in the chart from the past 30 days and (re)examined the patient.  Any updates or changes from those notes are reflected in this note.

## 2024-01-26 ENCOUNTER — ANESTHESIA (OUTPATIENT)
Dept: SURGERY | Facility: CLINIC | Age: 39
End: 2024-01-26
Payer: COMMERCIAL

## 2024-01-26 ENCOUNTER — HOSPITAL ENCOUNTER (OUTPATIENT)
Facility: CLINIC | Age: 39
Discharge: HOME | End: 2024-01-26
Attending: OBSTETRICS & GYNECOLOGY | Admitting: OBSTETRICS & GYNECOLOGY
Payer: COMMERCIAL

## 2024-01-26 VITALS
HEART RATE: 69 BPM | SYSTOLIC BLOOD PRESSURE: 129 MMHG | HEIGHT: 69 IN | RESPIRATION RATE: 18 BRPM | BODY MASS INDEX: 29.71 KG/M2 | TEMPERATURE: 98.5 F | WEIGHT: 200.62 LBS | OXYGEN SATURATION: 98 % | DIASTOLIC BLOOD PRESSURE: 87 MMHG

## 2024-01-26 DIAGNOSIS — R19.00 PELVIC MASS: ICD-10-CM

## 2024-01-26 DIAGNOSIS — G89.18 POST-OPERATIVE PAIN: Primary | ICD-10-CM

## 2024-01-26 LAB
ABO/RH(D): NORMAL
ANTIBODY SCREEN: NEGATIVE
HCG UR QL: NEGATIVE
HOLD SPECIMEN: NORMAL
SPECIMEN EXPIRATION DATE: NORMAL

## 2024-01-26 PROCEDURE — 272N000001 HC OR GENERAL SUPPLY STERILE: Performed by: OBSTETRICS & GYNECOLOGY

## 2024-01-26 PROCEDURE — 250N000011 HC RX IP 250 OP 636

## 2024-01-26 PROCEDURE — 86900 BLOOD TYPING SEROLOGIC ABO: CPT | Performed by: OBSTETRICS & GYNECOLOGY

## 2024-01-26 PROCEDURE — 710N000012 HC RECOVERY PHASE 2, PER MINUTE: Performed by: OBSTETRICS & GYNECOLOGY

## 2024-01-26 PROCEDURE — 81025 URINE PREGNANCY TEST: CPT | Performed by: OBSTETRICS & GYNECOLOGY

## 2024-01-26 PROCEDURE — 88302 TISSUE EXAM BY PATHOLOGIST: CPT | Mod: TC | Performed by: OBSTETRICS & GYNECOLOGY

## 2024-01-26 PROCEDURE — 258N000003 HC RX IP 258 OP 636

## 2024-01-26 PROCEDURE — 250N000009 HC RX 250

## 2024-01-26 PROCEDURE — 88112 CYTOPATH CELL ENHANCE TECH: CPT | Mod: TC | Performed by: OBSTETRICS & GYNECOLOGY

## 2024-01-26 PROCEDURE — 250N000013 HC RX MED GY IP 250 OP 250 PS 637: Performed by: OBSTETRICS & GYNECOLOGY

## 2024-01-26 PROCEDURE — 250N000013 HC RX MED GY IP 250 OP 250 PS 637

## 2024-01-26 PROCEDURE — 710N000009 HC RECOVERY PHASE 1, LEVEL 1, PER MIN: Performed by: OBSTETRICS & GYNECOLOGY

## 2024-01-26 PROCEDURE — 250N000011 HC RX IP 250 OP 636: Performed by: OBSTETRICS & GYNECOLOGY

## 2024-01-26 PROCEDURE — 88331 PATH CONSLTJ SURG 1 BLK 1SPC: CPT | Mod: TC | Performed by: OBSTETRICS & GYNECOLOGY

## 2024-01-26 PROCEDURE — 250N000011 HC RX IP 250 OP 636: Performed by: STUDENT IN AN ORGANIZED HEALTH CARE EDUCATION/TRAINING PROGRAM

## 2024-01-26 PROCEDURE — 88307 TISSUE EXAM BY PATHOLOGIST: CPT | Mod: 26 | Performed by: PATHOLOGY

## 2024-01-26 PROCEDURE — 360N000077 HC SURGERY LEVEL 4, PER MIN: Performed by: OBSTETRICS & GYNECOLOGY

## 2024-01-26 PROCEDURE — 999N000141 HC STATISTIC PRE-PROCEDURE NURSING ASSESSMENT: Performed by: OBSTETRICS & GYNECOLOGY

## 2024-01-26 PROCEDURE — 36415 COLL VENOUS BLD VENIPUNCTURE: CPT | Performed by: OBSTETRICS & GYNECOLOGY

## 2024-01-26 PROCEDURE — 88331 PATH CONSLTJ SURG 1 BLK 1SPC: CPT | Mod: 26 | Performed by: PATHOLOGY

## 2024-01-26 PROCEDURE — 88302 TISSUE EXAM BY PATHOLOGIST: CPT | Mod: 26 | Performed by: PATHOLOGY

## 2024-01-26 PROCEDURE — 250N000025 HC SEVOFLURANE, PER MIN: Performed by: OBSTETRICS & GYNECOLOGY

## 2024-01-26 PROCEDURE — 250N000009 HC RX 250: Performed by: STUDENT IN AN ORGANIZED HEALTH CARE EDUCATION/TRAINING PROGRAM

## 2024-01-26 PROCEDURE — 370N000017 HC ANESTHESIA TECHNICAL FEE, PER MIN: Performed by: OBSTETRICS & GYNECOLOGY

## 2024-01-26 PROCEDURE — 88112 CYTOPATH CELL ENHANCE TECH: CPT | Mod: 26 | Performed by: PATHOLOGY

## 2024-01-26 RX ORDER — HEPARIN SODIUM 5000 [USP'U]/.5ML
5000 INJECTION, SOLUTION INTRAVENOUS; SUBCUTANEOUS
Status: COMPLETED | OUTPATIENT
Start: 2024-01-26 | End: 2024-01-26

## 2024-01-26 RX ORDER — OXYCODONE HYDROCHLORIDE 5 MG/1
5 TABLET ORAL
Status: DISCONTINUED | OUTPATIENT
Start: 2024-01-26 | End: 2024-01-26 | Stop reason: HOSPADM

## 2024-01-26 RX ORDER — OXYCODONE HYDROCHLORIDE 10 MG/1
10 TABLET ORAL
Status: DISCONTINUED | OUTPATIENT
Start: 2024-01-26 | End: 2024-01-26 | Stop reason: HOSPADM

## 2024-01-26 RX ORDER — ACETAMINOPHEN 325 MG/1
975 TABLET ORAL ONCE
Status: COMPLETED | OUTPATIENT
Start: 2024-01-26 | End: 2024-01-26

## 2024-01-26 RX ORDER — SODIUM CHLORIDE, SODIUM LACTATE, POTASSIUM CHLORIDE, CALCIUM CHLORIDE 600; 310; 30; 20 MG/100ML; MG/100ML; MG/100ML; MG/100ML
INJECTION, SOLUTION INTRAVENOUS CONTINUOUS PRN
Status: DISCONTINUED | OUTPATIENT
Start: 2024-01-26 | End: 2024-01-26

## 2024-01-26 RX ORDER — LIDOCAINE HYDROCHLORIDE 20 MG/ML
INJECTION, SOLUTION INFILTRATION; PERINEURAL PRN
Status: DISCONTINUED | OUTPATIENT
Start: 2024-01-26 | End: 2024-01-26

## 2024-01-26 RX ORDER — HYDROMORPHONE HCL IN WATER/PF 6 MG/30 ML
0.4 PATIENT CONTROLLED ANALGESIA SYRINGE INTRAVENOUS EVERY 5 MIN PRN
Status: DISCONTINUED | OUTPATIENT
Start: 2024-01-26 | End: 2024-01-26 | Stop reason: HOSPADM

## 2024-01-26 RX ORDER — SODIUM CHLORIDE, SODIUM LACTATE, POTASSIUM CHLORIDE, CALCIUM CHLORIDE 600; 310; 30; 20 MG/100ML; MG/100ML; MG/100ML; MG/100ML
INJECTION, SOLUTION INTRAVENOUS CONTINUOUS
Status: DISCONTINUED | OUTPATIENT
Start: 2024-01-26 | End: 2024-01-26 | Stop reason: HOSPADM

## 2024-01-26 RX ORDER — IBUPROFEN 200 MG
800 TABLET ORAL ONCE
Status: DISCONTINUED | OUTPATIENT
Start: 2024-01-26 | End: 2024-01-26 | Stop reason: HOSPADM

## 2024-01-26 RX ORDER — DEXAMETHASONE SODIUM PHOSPHATE 4 MG/ML
INJECTION, SOLUTION INTRA-ARTICULAR; INTRALESIONAL; INTRAMUSCULAR; INTRAVENOUS; SOFT TISSUE PRN
Status: DISCONTINUED | OUTPATIENT
Start: 2024-01-26 | End: 2024-01-26

## 2024-01-26 RX ORDER — KETOROLAC TROMETHAMINE 30 MG/ML
INJECTION, SOLUTION INTRAMUSCULAR; INTRAVENOUS PRN
Status: DISCONTINUED | OUTPATIENT
Start: 2024-01-26 | End: 2024-01-26

## 2024-01-26 RX ORDER — OXYCODONE HYDROCHLORIDE 5 MG/1
5 TABLET ORAL EVERY 4 HOURS PRN
Qty: 6 TABLET | Refills: 0 | Status: CANCELLED | OUTPATIENT
Start: 2024-01-26

## 2024-01-26 RX ORDER — IBUPROFEN 600 MG/1
600 TABLET, FILM COATED ORAL EVERY 6 HOURS PRN
Qty: 12 TABLET | Refills: 0 | Status: SHIPPED | OUTPATIENT
Start: 2024-01-26 | End: 2024-01-26

## 2024-01-26 RX ORDER — LIDOCAINE 40 MG/G
CREAM TOPICAL
Status: DISCONTINUED | OUTPATIENT
Start: 2024-01-26 | End: 2024-01-26 | Stop reason: HOSPADM

## 2024-01-26 RX ORDER — ONDANSETRON 2 MG/ML
4 INJECTION INTRAMUSCULAR; INTRAVENOUS EVERY 30 MIN PRN
Status: DISCONTINUED | OUTPATIENT
Start: 2024-01-26 | End: 2024-01-26 | Stop reason: HOSPADM

## 2024-01-26 RX ORDER — FENTANYL CITRATE 50 UG/ML
INJECTION, SOLUTION INTRAMUSCULAR; INTRAVENOUS PRN
Status: DISCONTINUED | OUTPATIENT
Start: 2024-01-26 | End: 2024-01-26

## 2024-01-26 RX ORDER — HYDROMORPHONE HCL IN WATER/PF 6 MG/30 ML
0.2 PATIENT CONTROLLED ANALGESIA SYRINGE INTRAVENOUS EVERY 5 MIN PRN
Status: DISCONTINUED | OUTPATIENT
Start: 2024-01-26 | End: 2024-01-26 | Stop reason: HOSPADM

## 2024-01-26 RX ORDER — BUPIVACAINE HYDROCHLORIDE 2.5 MG/ML
INJECTION, SOLUTION EPIDURAL; INFILTRATION; INTRACAUDAL PRN
Status: DISCONTINUED | OUTPATIENT
Start: 2024-01-26 | End: 2024-01-26 | Stop reason: HOSPADM

## 2024-01-26 RX ORDER — ONDANSETRON 4 MG/1
4 TABLET, ORALLY DISINTEGRATING ORAL EVERY 30 MIN PRN
Status: DISCONTINUED | OUTPATIENT
Start: 2024-01-26 | End: 2024-01-26 | Stop reason: HOSPADM

## 2024-01-26 RX ORDER — FENTANYL CITRATE 50 UG/ML
25 INJECTION, SOLUTION INTRAMUSCULAR; INTRAVENOUS EVERY 5 MIN PRN
Status: DISCONTINUED | OUTPATIENT
Start: 2024-01-26 | End: 2024-01-26 | Stop reason: HOSPADM

## 2024-01-26 RX ORDER — LABETALOL HYDROCHLORIDE 5 MG/ML
10 INJECTION, SOLUTION INTRAVENOUS
Status: DISCONTINUED | OUTPATIENT
Start: 2024-01-26 | End: 2024-01-26 | Stop reason: HOSPADM

## 2024-01-26 RX ORDER — ONDANSETRON 2 MG/ML
INJECTION INTRAMUSCULAR; INTRAVENOUS PRN
Status: DISCONTINUED | OUTPATIENT
Start: 2024-01-26 | End: 2024-01-26

## 2024-01-26 RX ORDER — AMOXICILLIN 250 MG
1-2 CAPSULE ORAL 2 TIMES DAILY
Qty: 30 TABLET | Refills: 0 | Status: SHIPPED | OUTPATIENT
Start: 2024-01-26 | End: 2024-07-08

## 2024-01-26 RX ORDER — FENTANYL CITRATE 50 UG/ML
50 INJECTION, SOLUTION INTRAMUSCULAR; INTRAVENOUS EVERY 5 MIN PRN
Status: DISCONTINUED | OUTPATIENT
Start: 2024-01-26 | End: 2024-01-26 | Stop reason: HOSPADM

## 2024-01-26 RX ORDER — ACETAMINOPHEN 325 MG/1
975 TABLET ORAL EVERY 6 HOURS PRN
Start: 2024-01-26 | End: 2024-07-08

## 2024-01-26 RX ORDER — PROPOFOL 10 MG/ML
INJECTION, EMULSION INTRAVENOUS PRN
Status: DISCONTINUED | OUTPATIENT
Start: 2024-01-26 | End: 2024-01-26

## 2024-01-26 RX ORDER — OXYCODONE HYDROCHLORIDE 5 MG/1
5 TABLET ORAL EVERY 6 HOURS PRN
Qty: 6 TABLET | Refills: 0 | Status: SHIPPED | OUTPATIENT
Start: 2024-01-26 | End: 2024-01-29

## 2024-01-26 RX ORDER — HYDRALAZINE HYDROCHLORIDE 20 MG/ML
2.5-5 INJECTION INTRAMUSCULAR; INTRAVENOUS EVERY 10 MIN PRN
Status: DISCONTINUED | OUTPATIENT
Start: 2024-01-26 | End: 2024-01-26 | Stop reason: HOSPADM

## 2024-01-26 RX ADMIN — PROPOFOL 40 MG: 10 INJECTION, EMULSION INTRAVENOUS at 15:02

## 2024-01-26 RX ADMIN — ACETAMINOPHEN 975 MG: 325 TABLET, FILM COATED ORAL at 09:31

## 2024-01-26 RX ADMIN — FENTANYL CITRATE 25 MCG: 50 INJECTION, SOLUTION INTRAMUSCULAR; INTRAVENOUS at 16:03

## 2024-01-26 RX ADMIN — LIDOCAINE HYDROCHLORIDE 60 MG: 20 INJECTION, SOLUTION INFILTRATION; PERINEURAL at 13:06

## 2024-01-26 RX ADMIN — ONDANSETRON 4 MG: 2 INJECTION INTRAMUSCULAR; INTRAVENOUS at 14:59

## 2024-01-26 RX ADMIN — Medication 10 MG: at 13:49

## 2024-01-26 RX ADMIN — Medication 50 MG: at 13:06

## 2024-01-26 RX ADMIN — PROPOFOL 200 MG: 10 INJECTION, EMULSION INTRAVENOUS at 13:06

## 2024-01-26 RX ADMIN — SODIUM CHLORIDE, POTASSIUM CHLORIDE, SODIUM LACTATE AND CALCIUM CHLORIDE: 600; 310; 30; 20 INJECTION, SOLUTION INTRAVENOUS at 12:32

## 2024-01-26 RX ADMIN — Medication 10 MG: at 13:42

## 2024-01-26 RX ADMIN — Medication 20 MG: at 13:26

## 2024-01-26 RX ADMIN — Medication 20 MG: at 14:28

## 2024-01-26 RX ADMIN — KETOROLAC TROMETHAMINE 30 MG: 30 INJECTION, SOLUTION INTRAMUSCULAR at 13:15

## 2024-01-26 RX ADMIN — HEPARIN SODIUM 5000 UNITS: 10000 INJECTION, SOLUTION INTRAVENOUS; SUBCUTANEOUS at 09:22

## 2024-01-26 RX ADMIN — MIDAZOLAM 2 MG: 1 INJECTION INTRAMUSCULAR; INTRAVENOUS at 12:43

## 2024-01-26 RX ADMIN — FENTANYL CITRATE 25 MCG: 50 INJECTION, SOLUTION INTRAMUSCULAR; INTRAVENOUS at 15:39

## 2024-01-26 RX ADMIN — PHENYLEPHRINE HYDROCHLORIDE 50 MCG: 10 INJECTION INTRAVENOUS at 13:38

## 2024-01-26 RX ADMIN — DEXAMETHASONE SODIUM PHOSPHATE 4 MG: 4 INJECTION, SOLUTION INTRA-ARTICULAR; INTRALESIONAL; INTRAMUSCULAR; INTRAVENOUS; SOFT TISSUE at 13:23

## 2024-01-26 RX ADMIN — PHENYLEPHRINE HYDROCHLORIDE 150 MCG: 10 INJECTION INTRAVENOUS at 13:51

## 2024-01-26 RX ADMIN — FENTANYL CITRATE 50 MCG: 50 INJECTION INTRAMUSCULAR; INTRAVENOUS at 13:31

## 2024-01-26 RX ADMIN — SUGAMMADEX 200 MG: 100 INJECTION, SOLUTION INTRAVENOUS at 14:59

## 2024-01-26 RX ADMIN — ACETAMINOPHEN 975 MG: 325 TABLET, FILM COATED ORAL at 17:43

## 2024-01-26 RX ADMIN — SODIUM CHLORIDE, POTASSIUM CHLORIDE, SODIUM LACTATE AND CALCIUM CHLORIDE: 600; 310; 30; 20 INJECTION, SOLUTION INTRAVENOUS at 09:50

## 2024-01-26 ASSESSMENT — ACTIVITIES OF DAILY LIVING (ADL)
ADLS_ACUITY_SCORE: 31

## 2024-01-26 ASSESSMENT — ENCOUNTER SYMPTOMS
ORTHOPNEA: 0
SEIZURES: 0

## 2024-01-26 NOTE — ANESTHESIA POSTPROCEDURE EVALUATION
Patient: Jackie Juan    Procedure: Procedure(s):  Laparoscopic Single Site Right Salpingo-Oophorectomy, Left salpingectomy       Anesthesia Type:  General    Note:  Disposition: Outpatient   Postop Pain Control: Uneventful            Sign Out: Well controlled pain   PONV: No   Neuro/Psych: Uneventful            Sign Out: Acceptable/Baseline neuro status   Airway/Respiratory: Uneventful            Sign Out: Acceptable/Baseline resp. status   CV/Hemodynamics: Uneventful            Sign Out: Acceptable CV status; No obvious hypovolemia; No obvious fluid overload   Other NRE: NONE   DID A NON-ROUTINE EVENT OCCUR? No           Last vitals:  Vitals Value Taken Time   /88 01/26/24 1545   Temp 36.8  C (98.2  F) 01/26/24 1515   Pulse 67 01/26/24 1555   Resp 13 01/26/24 1555   SpO2 100 % 01/26/24 1555   Vitals shown include unfiled device data.    Electronically Signed By: Dhaval Ramos MD  January 26, 2024  3:56 PM

## 2024-01-26 NOTE — PROGRESS NOTES
Gynecologic Oncology Postoperative Check Note  1/26/2024    S: Patient reports she is doing well postoperatively. Was initially having pain and nausea which both improved after burping. Pain is well controlled with oral pain medications. Ambulating without dizziness. Voiding spontaneously. Tolerating crackers and water without nausea or vomiting. Reports right shoulder pain; denies chest pain, shortness of breath or other concerns at this time.    O:  Vitals:    01/26/24 1615 01/26/24 1654 01/26/24 1735 01/26/24 1740   BP: 108/75 115/77 129/87    BP Location:       Pulse: 63 73 69    Resp: 15 16 18    Temp: 98.1  F (36.7  C) 98.5  F (36.9  C)     TempSrc: Axillary Axillary     SpO2: 100% 97%  98%   Weight:       Height:         Gen: NAD, sitting up in the chair  Cardio: RRR, S1/S2, no murmurs  Resp: CTAB, no wheezing or crackles  Abdomen: soft, appropriately tender, single umbilical incision with overlying dressing. Dressing clean and dry.  Extremities: Non-tender, no edema    A/P: 38 year old POD#0 s/p single port dx lsc, RSO, LS, removal of adnexal mass. Doing well and meeting post op goals. Discussed post op expectations, restrictions, and return precautions.    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  wnl. Final pathology pending.  FEN: ADAT  Pain: tylenol, oxy prn. Avoid ibuprofen given she is on chronic anticoagulation.  Heme: Hx factor V leiden mutation (hetero). Hx multiple DVTs now on chronic anticoagulation (rivaroxaban). Hgb 13.5 (7/21/23)> EBL 2 mL  CV: NI  Pulm: NI  GI: NI  : S/p jerome  ID: S/p abx  Endo: Hx hypothyroidism on levothyroxine  Psych/Neuro/MSK: NI  PPX: SCDs, IS  Drains/Lines: PIV     Dispo: To home    Grisel Miller MD  Ob/Gyn Resident, PGY-2  1/26/2024 7:30 PM

## 2024-01-26 NOTE — BRIEF OP NOTE
[facility]    Brief Operative Note    Pre-operative diagnosis: Pelvic mass [R19.00]  Post-operative diagnosis Same as pre-operative diagnosis    Procedure: Laparoscopic Single Site Right Salpingo-Oophorectomy, Left salpingectomy, N/A - Abdomen    Surgeon: Surgeon(s) and Role:     * Antoni Stover MD - Primary     * Omid Hager MD - Resident - Assisting  Anesthesia: General   Estimated Blood Loss: 2 mL from 1/26/2024 12:48 PM to 1/26/2024  3:13 PM  IVF: 1100  UOP: 200    Drains: None  Specimens:   ID Type Source Tests Collected by Time Destination   1 : pelvic washings Washings Pelvis NON-GYNECOLOGIC CYTOLOGY Antoni Stover MD 1/26/2024  1:55 PM    2 : right ovary and fallopian tube Tissue Ovary and Fallopian Tube, Right SURGICAL PATHOLOGY EXAM Antoni Stover MD 1/26/2024  2:26 PM    3 : left fallopian tube Tissue Fallopian Tube, Left SURGICAL PATHOLOGY EXAM Antoni Stover MD 1/26/2024  2:32 PM      Findings:   Midline cyst arising from the right ovary. Some normal ovarian tissue on the side of the cyst. The IP ligament came directly in to the cyst, so unable to preserve ovarian vasculature, so RSO performed. Normal left ovary, tube, and uterus. Left tube removed. Otherwise normal abdomen .  Complications: None.  Implants: * No implants in log *      Omid Hager MD  OB/GYN PGY-4  01/26/2024 3:21 PM

## 2024-01-26 NOTE — ANESTHESIA CARE TRANSFER NOTE
Patient: Jackie Juan    Procedure: Procedure(s):  Laparoscopic Single Site Right Salpingo-Oophorectomy, Left salpingectomy       Diagnosis: Pelvic mass [R19.00]  Diagnosis Additional Information: No value filed.    Anesthesia Type:   General     Note:    Oropharynx: oropharynx clear of all foreign objects and spontaneously breathing  Level of Consciousness: awake  Oxygen Supplementation: face mask  Level of Supplemental Oxygen (L/min / FiO2): 4  Independent Airway: airway patency satisfactory and stable  Dentition: dentition unchanged  Vital Signs Stable: post-procedure vital signs reviewed and stable  Report to RN Given: handoff report given  Patient transferred to: PACU    Handoff Report: Identifed the Patient, Identified the Reponsible Provider, Reviewed the pertinent medical history, Discussed the surgical course, Reviewed Intra-OP anesthesia mangement and issues during anesthesia, Set expectations for post-procedure period and Allowed opportunity for questions and acknowledgement of understanding    Vitals:  Vitals Value Taken Time   /79 01/26/24 1515   Temp 36.1    Pulse 80 01/26/24 1519   Resp 12    SpO2 100 % 01/26/24 1519   Vitals shown include unfiled device data.    Electronically Signed By: LAI Barrow CRNA  January 26, 2024  3:21 PM

## 2024-01-26 NOTE — ANESTHESIA PROCEDURE NOTES
Airway       Patient location during procedure: OR       Procedure Start/Stop Times: 1/26/2024 1:13 PM  Staff -        Anesthesiologist:  Monalisa Harding MD       Resident/Fellow: Charline Vera MD       Performed By: anesthesiologist and resident  Consent for Airway        Urgency: elective  Indications and Patient Condition       Indications for airway management: roxanna-procedural and airway protection       Induction type:intravenous       Mask difficulty assessment: 1 - vent by mask    Final Airway Details       Final airway type: endotracheal airway       Successful airway: ETT - single  Endotracheal Airway Details        ETT size (mm): 7.0       Cuffed: yes       Successful intubation technique: direct laryngoscopy       DL Blade Type: MAC 3       Grade View of Cords: 1       Adjucts: stylet       Position: Right       Measured from: gums/teeth       Secured at (cm): 20    Post intubation assessment        Placement verified by: capnometry and chest rise        Number of attempts at approach: 1       Secured with: tape       Ease of procedure: easy       Dentition: Intact and Unchanged    Medication(s) Administered   Medication Administration Time: 1/26/2024 1:13 PM

## 2024-01-26 NOTE — DISCHARGE INSTRUCTIONS
Contacting your Doctor -   To contact a doctor, call Dr Stover [phone] and ask for the resident on call for Gynecology (answered 24 hours a day)   Emergency Department:  [facility]: [phone]  [facility]: [phone] 911 if you are in need of immediate or emergent help

## 2024-01-29 LAB
PATH REPORT.COMMENTS IMP SPEC: NORMAL
PATH REPORT.FINAL DX SPEC: NORMAL
PATH REPORT.GROSS SPEC: NORMAL
PATH REPORT.MICROSCOPIC SPEC OTHER STN: NORMAL
PATH REPORT.RELEVANT HX SPEC: NORMAL

## 2024-01-30 ENCOUNTER — PATIENT OUTREACH (OUTPATIENT)
Dept: ONCOLOGY | Facility: CLINIC | Age: 39
End: 2024-01-30
Payer: COMMERCIAL

## 2024-01-30 NOTE — OP NOTE
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    Procedure(s):  Single incision laparoscopic right salpingo-oophorectomy, left salpingectomy     Surgeon(s) and Role:     * Antoni Stover MD - Primary     * Omid Hager MD - Resident - Assisting    Anesthesia:   General     EBL: 2 mL    Drains: None    Specimen(s):  ID Type Source Tests Collected by Time Destination   1 : pelvic washings Washings Pelvis NON-GYNECOLOGIC CYTOLOGY Antoni Stover MD 1/26/2024  1:55 PM    2 : right ovary and fallopian tube Tissue Ovary and Fallopian Tube, Right SURGICAL PATHOLOGY EXAM Antoni Stover MD 1/26/2024  2:26 PM    3 : left fallopian tube Tissue Fallopian Tube, Left SURGICAL PATHOLOGY EXAM Antoni Stover MD 1/26/2024  2:32 PM        Findings: Midline pelvic cyst posterior to the uterus arising from the right ovary. There was normal ovary adjacent/adherent to the cyst, however, the ovarian vessels went directly into the cyst making it impossible to remove the cyst while preserving ovarian vasculature. Left ovary with physiologic cyst. Uterus, left tube and ovary otherwise normal.      Complications: None apparent    Implants: None     Indication:  Jackie Juan is a 38 year old with pelvic cyst on imaging also with no desire for future fertility.    Description of Procedure:  Informed consent had previously been obtained, the patient was taken to the Operating Room. Anesthesia as noted above was administered. Exam performed with findings noted above. She was placed in dorsal low lithotomy position in stirrups with her arms tucked at her sides. She was prepped and draped in the usual sterile fashion. Surgical time-out was performed.     A jerome catheter was placed. A uterine manipulator was not used.    The umbilicus was everted using Allis clamps. A 3cm midline vertical skin incision  was made through the base of the umbilicus with a scalpel. This was carried to the fascia with cautery. The fascia was grasped with Kocher clamps, and incised with scissors. The fascial incision was extended to about 3-4 cm. The peritoneum was grasped, tented up, and entered sharply. The Gel-Foam single port apparatus was placed through the incision ensuring no loops of bowel were caught under the retractor and the GelPoint with ports applied. The abdomen was insufflated to 15 mmHg with carbon dioxide.  The above findings were noted. The patient was placed in Trendelenberg and the bowels were then moved cephalad. Pelvic washing were obtained for cytology.     Although there was a portion of normal ovary on the right, the cyst was adherent to the ovary and the ovarian vessels entered the cyst just posterior to the normal appearing ovarian tissue such that the cyst could not be removed without taking the vasculature to the ovary. Thus decision was made to proceed with removal of the right ovary. The retroperitoneum was opened and the ureter was visualized. The gonadal vessels were skeletonized, cauterized and transected 3cm from the ovary with the Ligasure with care to visualize the ureter far from the dissection at all times. The fallopian tube was  from its attachments to the level of the uterine cornua. The utero-ovarian ligament was sealed and transected. The cyst was placed in a 15cm bag and brought up through the Clifford retractor. It was drained and removed within the bag. The suction tip was removed and replaced. The cyst was sent for pathology and returned benign.     The left fallopian tube was elevated and resected using the Ligasure. The ovary was preserved. The tube was removed. Hemostasis was confirmed.    All instruments were then removed and counts confirmed as correct. The fascia was then closed using 0-Vicryl in a running fashion. The skin was closed with 4-0 Monocryl, tacking the umbilical  complex back to the fascia.     Local anesthetic was instilled beneath the incision. A sterile dressing was placed. The patient tolerated the procedure well and was reversed from general endotracheal anesthesia and taken to the recovery room in stable condition.    Antoni Stover MD

## 2024-02-01 LAB
PATH REPORT.COMMENTS IMP SPEC: NORMAL
PATH REPORT.FINAL DX SPEC: NORMAL
PATH REPORT.GROSS SPEC: NORMAL
PATH REPORT.INTRAOP OBS SPEC DOC: NORMAL
PATH REPORT.MICROSCOPIC SPEC OTHER STN: NORMAL
PATH REPORT.RELEVANT HX SPEC: NORMAL
PHOTO IMAGE: NORMAL

## 2024-02-06 ENCOUNTER — ONCOLOGY VISIT (OUTPATIENT)
Dept: ONCOLOGY | Facility: CLINIC | Age: 39
End: 2024-02-06
Attending: OBSTETRICS & GYNECOLOGY
Payer: COMMERCIAL

## 2024-02-06 VITALS
BODY MASS INDEX: 28.96 KG/M2 | OXYGEN SATURATION: 100 % | WEIGHT: 198 LBS | DIASTOLIC BLOOD PRESSURE: 81 MMHG | TEMPERATURE: 97.6 F | RESPIRATION RATE: 16 BRPM | SYSTOLIC BLOOD PRESSURE: 114 MMHG | HEART RATE: 75 BPM

## 2024-02-06 DIAGNOSIS — D27.0: Primary | ICD-10-CM

## 2024-02-06 PROCEDURE — 99024 POSTOP FOLLOW-UP VISIT: CPT | Performed by: OBSTETRICS & GYNECOLOGY

## 2024-02-06 PROCEDURE — 99212 OFFICE O/P EST SF 10 MIN: CPT | Performed by: OBSTETRICS & GYNECOLOGY

## 2024-02-06 ASSESSMENT — PAIN SCALES - GENERAL: PAINLEVEL: NO PAIN (0)

## 2024-02-06 NOTE — LETTER
2/6/2024         RE: Jackie Juan  [address] [facility] [address]        Dear Colleague,    Thank you for referring your patient, Jackie Juan, to the [facility]. Please see a copy of my visit note below.    Gynecologic Oncology Clinic - Post-operative appointment    Visit date: Feb 6, 2024     Reason for visit: post-op    Interval history: Jackie Juan is a 38 year old here for routine post-op exam after single-incision laparoscopic right salpingo-oophorectomy, left salpingectomy for ovarian cyst benign on pathology.    Overall doing well. Energy is improving. Pain is tolerable. No incisional concerns.     Past Surgical History:   Procedure Laterality Date    LAPAROSCOPIC SINGLE SITE SALPINGO-OOPHORECTOMY N/A 1/26/2024    Procedure: Laparoscopic Single Site Right Salpingo-Oophorectomy, Left salpingectomy;  Surgeon: Antoni Stover MD;  Location:  OR    NO HISTORY OF SURGERY      WISDOM TEETH EXTRACTION         Physical Exam:  /81   Pulse 75   Temp 97.6  F (36.4  C) (Oral)   Resp 16   Wt 89.8 kg (198 lb)   LMP 01/05/2024 (Approximate)   SpO2 100%   BMI 28.96 kg/m     General appearance: no acute distress, well groomed, sitting comfortably   Abdomen: incision healing well, glue removed from umbilicus    Pathology:  Lab Results   Component Value Date    CASEREPORT  01/26/2024     Surgical Pathology Report                         Case: [id number]                                  Authorizing Provider:  Antoni Stover MD         Collected:           01/26/2024 02:26 PM          Ordering Location:      MAIN OR                 Received:            01/26/2024 02:39 PM          Pathologist:           Veena Khan MD                                                   Intraop:               Johnnie Menezes MD                                                           Specimens:   A) - Ovary and Fallopian Tube, Right, right ovary and fallopian tube                                 B) - Fallopian Tube, Left, left fallopian tube                                             FINALDX  01/26/2024     A. RIGHT ovary and fallopian tube, laparoscopic salpingo-oophorectomy:  -Adnexal serous cystadenofibroma (12 cm, disrupted), over-lying an otherwise unremarkable fallopian tube  -Ovary with multiple follicular cysts  -Negative for malignancy    B. LEFT fallopian tube, laparoscopic salpingectomy:  -Fallopian tube with paratubal cysts  -Negative for malignancy           Assessment/Plan: Jackie Juan is a 38 year old s/p bilateral salpingectomy, right oophorectomy with benign pathology doing well post-operatively.    - Reviewed pathology showing benign pathology.   - Continue post-op restrictions until 2 weeks post-operatively. Avoid significant exertion if possible for 4 weeks.  - No further follow-up needed in our clinic unless post-op concerns, which should be brought to us.     Antoni Stover MD

## 2024-02-06 NOTE — NURSING NOTE
"Oncology Rooming Note    February 6, 2024 3:14 PM   Jackie Juan is a 38 year old female who presents for:    No chief complaint on file.    Initial Vitals: /81   Pulse 75   Temp 97.6  F (36.4  C) (Oral)   Resp 16   Wt 89.8 kg (198 lb)   LMP 01/05/2024 (Approximate)   SpO2 100%   BMI 28.96 kg/m   Estimated body mass index is 28.96 kg/m  as calculated from the following:    Height as of 1/26/24: 1.761 m (5' 9.33\").    Weight as of this encounter: 89.8 kg (198 lb). Body surface area is 2.1 meters squared.  No Pain (0) Comment: Data Unavailable   Patient's last menstrual period was 01/05/2024 (approximate).  Allergies reviewed: Yes  Medications reviewed: Yes    Medications: Medication refills not needed today.  Pharmacy name entered into [employer]:    [id number] - [place], [place] - [address]  [facility] [place] [address]    Frailty Screening:   Is the patient here for a new oncology consult visit in cancer care? 2. No      Clinical concerns: Patient states there are no new concerns to discuss with provider.  Dr Stover was not notified.         Dilia Banuelos [facility]              "

## 2024-02-07 NOTE — PATIENT INSTRUCTIONS
It was a pleasure seeing you in clinic today-please reach out if there are any further questions that arise following today's visit.  During business hours, you may reach me at [phone].  For urgent/emergent questions after business hours, you may reach the on-call Gynecologic Oncology Resident through the [facility] at [phone].    All normal test results are usually communicated via letter or [employer] message.  Abnormal results (those that require a change in the previously discussed plan of care) are usually communicated via a phone call.    I recommend signing up for [employer] access if you have not already done so.  This allows you online access to your lab results and also helps you communicate efficiently with your clinic should any questions arise in your care.  No follow-up in gyn oncology  Continue regular screening visits with primary care and gynecology    Dr Wali Shoemaker RN  Phone:  [phone]  Fax:  [phone]

## 2024-02-09 NOTE — PROGRESS NOTES
Gynecologic Oncology Clinic - Post-operative appointment    Visit date: Feb 6, 2024     Reason for visit: post-op    Interval history: Jackie Juan is a 38 year old here for routine post-op exam after single-incision laparoscopic right salpingo-oophorectomy, left salpingectomy for ovarian cyst benign on pathology.    Overall doing well. Energy is improving. Pain is tolerable. No incisional concerns.     Past Surgical History:   Procedure Laterality Date    LAPAROSCOPIC SINGLE SITE SALPINGO-OOPHORECTOMY N/A 1/26/2024    Procedure: Laparoscopic Single Site Right Salpingo-Oophorectomy, Left salpingectomy;  Surgeon: Antoni Stover MD;  Location:  OR    NO HISTORY OF SURGERY      WISDOM TEETH EXTRACTION         Physical Exam:  /81   Pulse 75   Temp 97.6  F (36.4  C) (Oral)   Resp 16   Wt 89.8 kg (198 lb)   LMP 01/05/2024 (Approximate)   SpO2 100%   BMI 28.96 kg/m     General appearance: no acute distress, well groomed, sitting comfortably   Abdomen: incision healing well, glue removed from umbilicus    Pathology:  Lab Results   Component Value Date    CASEREPORT  01/26/2024     Surgical Pathology Report                         Case: [id number]                                  Authorizing Provider:  Antoni Stover MD         Collected:           01/26/2024 02:26 PM          Ordering Location:      MAIN OR                 Received:            01/26/2024 02:39 PM          Pathologist:           Veena Khan MD                                                   Intraop:               Johnnie Menezes MD                                                           Specimens:   A) - Ovary and Fallopian Tube, Right, right ovary and fallopian tube                                B) - Fallopian Tube, Left, left fallopian tube                                             FINALDX  01/26/2024     A. RIGHT ovary and fallopian tube, laparoscopic salpingo-oophorectomy:  -Adnexal serous cystadenofibroma (12 cm,  disrupted), over-lying an otherwise unremarkable fallopian tube  -Ovary with multiple follicular cysts  -Negative for malignancy    B. LEFT fallopian tube, laparoscopic salpingectomy:  -Fallopian tube with paratubal cysts  -Negative for malignancy           Assessment/Plan: Jackie Juan is a 38 year old s/p bilateral salpingectomy, right oophorectomy with benign pathology doing well post-operatively.    - Reviewed pathology showing benign pathology.   - Continue post-op restrictions until 2 weeks post-operatively. Avoid significant exertion if possible for 4 weeks.  - No further follow-up needed in our clinic unless post-op concerns, which should be brought to us.     Antoni Stover MD

## 2024-02-28 NOTE — PROGRESS NOTES
Post Operative Phone Call  Surgery date: 1/26/24  Post op visit:  2/6/24  description of surgery    Spoke with pt states well    Pain   Location of pain:  abdomen   Rate pain on scale 1-10:     Is pain well controlled:  yes   What pain medications are you taking and how often:    GI/              Denies bowel/bladder issues, nausea/vomiting   When was last bowel movement and are they regular:  yes  Eating and drinking well    Lower extremities  Denies pain, leg redness/swelling/tenderness/warm touch:na  Were lymph nodes removed:  na   Was lymphedema consult offered:na  Denies chest pain or shortness of breath:  no  Wound/Incision    Healthy: no signs of infection    Denies redness, odor, or drainage:  no   Shower:  yes, wash incision with soap and water/no scrubbing    Staples:  na to be removed in 7-10 days  appt made    Walking:  yes    Questions:  no    Post op appt confirmed, pathology will be discussed at this appt   Patient will call if any questions or concerns

## 2024-04-15 ENCOUNTER — TELEPHONE (OUTPATIENT)
Dept: HEMATOLOGY | Facility: CLINIC | Age: 39
End: 2024-04-15
Payer: COMMERCIAL

## 2024-06-26 NOTE — PROGRESS NOTES
[facility]  [facility], [address], [place] [address]  Main: [phone], Fax: [phone]    Video Virtual Visit Note:    Patient: Jackie Juan  MRN: [id number]  : 1985  RAMANDEEP: 2024  Location of the patient when this video visit is conducted: Patient's home  Location of this writer at the time of this video visit is conducted: [facility].     Due to the ongoing COVID-19 outbreak, this visit was conducted by video, with the patient's approval.    Reason of today's visit:  Right distal lower extremity DVT on 2021.      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 The patient was last seen by this writer back on 2023.     Thrombosis History Summary:  Mid 2021, she flew from [place] to [place] (which was about 1.5 hours flight).   Just a few weeks prior to her diagnosis of right leg DVT in 2021, she recalls that she was using a different setting for her cross-stitching hobby where she basically sit crossed legged with a 2x4 plank on her leg for 3-4 hours at a time.   Then on 2021, she presented to her primary care provider's office for evaluation of a 2 weeks history of intermittent pain over the right calf worse after prolonged ambulation. Ultrasound at the time showed: occlusive thrombus in one of the paired peroneal veins. For this reason, she was placed on rivaroxaban.   Jackie's mother  has bilateral lower extremity DVT and bilateral pulmonary embolism back about 20 years ago when she was in her mid-40's. Her venous thromboembolic event was thought to be unprovoked. She was later found to have factor V Leiden mutation and thus Jackie was tested for this mutation back in 2013 and is found to have heterozygous factor V Leiden mutation. Because of this finding, Jackie has not been on estrogen containing OCP.   12/6/2021, she established care with this writer for which I have determined that her distal right leg DVT in Aug 2021 was at best a minimally provoked event. At the time, I did discussed with her that we should consider keeping her indefinite anticoagulation therapy.   12/14/2021, repeat right leg venous ultrasound showed persistent thrombus in the right peroneal vein in the proximal / mid calf. Thrombus in the peroneal vein in the distal calf now appears to be resolved. I recommended continuation with rivaroxaban for another 3 months.   3/22/2022, repeat ultrasound showed no DVT demonstrated in the right lower extremity. Previously known right peroneal vein thrombus has resolved and the patient has elected to stop anticoagulation therapy at that point.   11/23/2022, she presented to her primary care provider's office with a 3 days complaint of right leg pain. Concern for recurrent DVT, an ultrasound was done which showed occlusive thrombus in the right gastrocnemius vein. She was restarted on rivaroxaban and is referred back to our clinic for consultation.   1/12/2023, re-established care with this writer and recommended that she should remain on indefinite anticoagulation therapy.   1/19/2023, Cardiolipin Abys and beta 2 glycoprotein Abys were negative.   3/23/2023, Repeat right leg venous ultrasound showed no DVT demonstrated in the right leg. Previous right gastrocnemius DVT has cleared.      Interim History:  11/1/2023, had her routine annual exam and reported vaginal spotting.   11/13/2023,  Pelvic ultrasound showed a large cystic mass in the left adnexa without apparent connection to the ovary or bladder by ultrasound.   11/20/2023, Saw Dr. Sam Naqvi of Ob/Gyn.   12/1/2023, pelvic MRI showed a large unilocular cystic mass in the midline pelvis which contains proteinaceous fluid and small amount of layering debris. No suspicious features to suggest malignancy. The mass is separate from the ovaries and of unclear origin.   12/13/2023, she was seen by Dr. Antoni Stover of Gyn Oncology.   1/26/2024, she underwent laparoscopic right salpingo-oophorectomy and left salpingectomy. 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 Final surgical path also showed no malignancy. She held her rivaroxaban for 48 hours prior to procedure and then restarted it the day after her procedure. No bleeding complications.     Currently she is on rivaroxaban at 20 mg PO Qday dosing. Denies any bleeding issues.     ROS:  Denies any bleeding complications. Specifically, no frequent epistaxis. No issues with oral mucosal bleeding. Denies any hematuria or blood in stools. Denies any shortness of breath. No chest pain. No cough. No fever.      Medications:   Current Outpatient Medications   Medication Sig Dispense Refill    acetaminophen (TYLENOL) 325 MG tablet Take 3 tablets (975 mg) by mouth every 6 hours as needed for mild pain      acyclovir (ZOVIRAX) 400 MG tablet Take 1 tablet (400 mg) by mouth 2 times daily (Patient not taking: Reported on 2/6/2024) 30 tablet 1    levothyroxine (SYNTHROID/LEVOTHROID) 75 MCG tablet TAKE 1 TABLET(75 MCG) BY MOUTH DAILY 90 tablet 2    rivaroxaban ANTICOAGULANT (XARELTO ANTICOAGULANT) 20 MG TABS tablet Take 1 tablet (20 mg) by mouth daily (with dinner) 90 tablet 3    senna-docusate (SENOKOT-S/PERICOLACE) 8.6-50 MG tablet Take 1-2 tablets by mouth 2 times daily (Patient not taking: Reported on 2/6/2024) 30 tablet 0     No current facility-administered medications for this  visit.       Allergies:  Allergies   Allergen Reactions    Shellfish Allergy         PMH:   Past Medical History:   Diagnosis Date    Deep vein thrombosis (H)     x 2    Factor 5 Leiden mutation, heterozygous 02/11/2014    Kawasaki disease (H)     Thyroid disease        Social History:   Deferred    Family History:  Deferred    Objective:  Visual Examination via Video:  Pleasant in no acute distress.  Normal work of breathing   A+O x 3      Assessment:  In summary, Jackie is a 38 year old female with a family history of DVT/PE and factor V Leiden mutation, who is known to have heterozygous factor V Leiden mutation, who is found to have a distal right leg DVT back in 8/31/2021 S/P close to 7 months of anticoagulation therapy with rivaroxaban at the time, then recurrent right distal lower extremity DVT on 11/23/2022, currently on indefinite anticoagulation therapy with rivaroxaban at 20 mg PO Qday, participates in today's scheduled video visit for her follow up visit.      Jackie's right distal lower extremity DVT in 8/31/2021 was an unprovoked venous thromboembolic event or may be minimally provoked by the fact that she was sitting cross legged doing cross stitches with a 2x4 plank on her leg for 3-4 hours at a time for the couple weeks prior to her diagnosis of DVT. She completed close to 7 months of anticoagulation therapy with rivaroxaban at the time.      Her 11/23/2022 recurrent right distal leg DVT is also an unprovoked event.      Diagnosis:  Right distal lower extremity DVT on 8/31/2021, likely unprovoked.  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   Heterozygous factor V Leiden mutation.   Family history of DVT/PE.   Family history of factor V Leiden mutation.     Plan:  Jackie remains to be a good candidate to stay on indefinite anticoagulation therapy. Jackie inquires about our last year's discussion about decreasing her rivaroxaban dose to 10 mg Qday prevention dosing regimen. I again  discuss this further with her today and after answering all her questions to her satisfaction, Jackie would rather stay on the 20 mg PO Qday dosing at this time. I have renewed her rivaroxaban prescription today with a one year refill.     I will have her get a repeat CBC and CMP done at her nearest [facility] in the next 1-2 weeks.     She is instructed to call our clinic if she should experience any unusual bleeding issues or if she should need any invasive procedures or surgical procedures in the future. Otherwise, I will plan to see her back in one year for follow up. Virtual or in-person visit is fine.      Video-Visit Details:  Type of service:  Video Visit  Video Start Time:  12:47  Video End Time (time video stopped): 13:00  Originating Location (pt. Location): Home  Distant Location (provider location):  [facility]   Mode of Communication:  Video Conference via [employer]      Lizandro Zimmer PA-C, MPAS  Physician Assistant  [facility].     The longitudinal plan of care for these conditions below were addressed during this visit. Due to the added complexity in care, I will continue to support Jackie Juan  in the subsequent management of these conditions and with the ongoing continuity of care of these conditions.    Problem List Items Addressed This Visit as of 2/19/2024   Right distal lower extremity DVT on 8/31/2021, likely unprovoked.  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   Heterozygous factor V Leiden mutation.   Family history of DVT/PE.   Family history of factor V Leiden mutation.       20 minutes spent by me on the date of the encounter doing chart review, history and exam, documentation and further activities per the note

## 2024-07-08 ENCOUNTER — VIRTUAL VISIT (OUTPATIENT)
Dept: HEMATOLOGY | Facility: CLINIC | Age: 39
End: 2024-07-08
Attending: PHYSICIAN ASSISTANT
Payer: COMMERCIAL

## 2024-07-08 VITALS — WEIGHT: 188 LBS | BODY MASS INDEX: 27.5 KG/M2

## 2024-07-08 DIAGNOSIS — Z83.2 FAMILY HISTORY OF FACTOR V LEIDEN MUTATION: ICD-10-CM

## 2024-07-08 DIAGNOSIS — I82.401 RECURRENT ACUTE DEEP VEIN THROMBOSIS (DVT) OF RIGHT LOWER EXTREMITY (H): ICD-10-CM

## 2024-07-08 DIAGNOSIS — I82.461 ACUTE DEEP VEIN THROMBOSIS (DVT) OF CALF MUSCLE VEIN OF RIGHT LOWER EXTREMITY (H): ICD-10-CM

## 2024-07-08 DIAGNOSIS — Z79.01 CHRONIC ANTICOAGULATION: ICD-10-CM

## 2024-07-08 DIAGNOSIS — Z82.49 FAMILY HISTORY OF DVT: ICD-10-CM

## 2024-07-08 DIAGNOSIS — D68.51 FACTOR 5 LEIDEN MUTATION, HETEROZYGOUS (H): Primary | ICD-10-CM

## 2024-07-08 PROCEDURE — G2211 COMPLEX E/M VISIT ADD ON: HCPCS | Mod: 95 | Performed by: PHYSICIAN ASSISTANT

## 2024-07-08 PROCEDURE — 99213 OFFICE O/P EST LOW 20 MIN: CPT | Mod: 95 | Performed by: PHYSICIAN ASSISTANT

## 2024-07-08 NOTE — NURSING NOTE
Patient was contacted to complete the pre-visit call prior to their telephone visit with the provider.     Allergies and medications were reviewed.     I thanked them for their time to cover this information.     Jenise Hardwick, CHATO

## 2024-07-08 NOTE — PATIENT INSTRUCTIONS
Jackie,    It was nice to see you via video visit today.    Below is a summary of our plan:  Please continues to take rivaroxaban (Xarelto) at 20 mg by mouth every 24 hours with food.   Please call your nearest [facility] to make a lab only appointment to get your labs, complete blood count and comprehensive metabolic panel, done in the next 1-2 weeks. I have placed orders in your chart. I will reach out via [employer] once these lab results are back.   If you should have any further questions, or experience any unusual bleeding issues or if you should need any invasive or surgical procedures in the future, please call us at [phone] and ask to speak to a nursing staff.  One of our administrative assistants will contact you to schedule your return visit with me in one year.     Thank you once again in choosing our clinic as part of your healthcare team.      Lizandro Zimmer PA-C, MPAS  Physician Assistant  [facility].

## 2024-07-22 ENCOUNTER — LAB (OUTPATIENT)
Dept: LAB | Facility: CLINIC | Age: 39
End: 2024-07-22
Payer: COMMERCIAL

## 2024-07-22 DIAGNOSIS — D68.51 FACTOR 5 LEIDEN MUTATION, HETEROZYGOUS (H): ICD-10-CM

## 2024-07-22 DIAGNOSIS — Z82.49 FAMILY HISTORY OF DVT: ICD-10-CM

## 2024-07-22 DIAGNOSIS — E03.9 HYPOTHYROIDISM: Primary | ICD-10-CM

## 2024-07-22 DIAGNOSIS — Z79.01 CHRONIC ANTICOAGULATION: ICD-10-CM

## 2024-07-22 DIAGNOSIS — Z83.2 FAMILY HISTORY OF FACTOR V LEIDEN MUTATION: ICD-10-CM

## 2024-07-22 DIAGNOSIS — Z13.220 LIPID SCREENING: ICD-10-CM

## 2024-07-22 DIAGNOSIS — I82.401 RECURRENT ACUTE DEEP VEIN THROMBOSIS (DVT) OF RIGHT LOWER EXTREMITY (H): ICD-10-CM

## 2024-07-22 LAB
ALBUMIN SERPL BCG-MCNC: 4.6 G/DL (ref 3.5–5.2)
ALP SERPL-CCNC: 60 U/L (ref 40–150)
ALT SERPL W P-5'-P-CCNC: 8 U/L (ref 0–50)
ANION GAP SERPL CALCULATED.3IONS-SCNC: 10 MMOL/L (ref 7–15)
AST SERPL W P-5'-P-CCNC: 21 U/L (ref 0–45)
BILIRUB SERPL-MCNC: 0.8 MG/DL
BUN SERPL-MCNC: 8.9 MG/DL (ref 6–20)
CALCIUM SERPL-MCNC: 9.4 MG/DL (ref 8.8–10.4)
CHLORIDE SERPL-SCNC: 101 MMOL/L (ref 98–107)
CHOLEST SERPL-MCNC: 181 MG/DL
CREAT SERPL-MCNC: 0.79 MG/DL (ref 0.51–0.95)
EGFRCR SERPLBLD CKD-EPI 2021: >90 ML/MIN/1.73M2
ERYTHROCYTE [DISTWIDTH] IN BLOOD BY AUTOMATED COUNT: 12.1 % (ref 10–15)
FASTING STATUS PATIENT QL REPORTED: YES
FASTING STATUS PATIENT QL REPORTED: YES
GLUCOSE SERPL-MCNC: 96 MG/DL (ref 70–99)
HCO3 SERPL-SCNC: 28 MMOL/L (ref 22–29)
HCT VFR BLD AUTO: 40.7 % (ref 35–47)
HDLC SERPL-MCNC: 74 MG/DL
HGB BLD-MCNC: 13.5 G/DL (ref 11.7–15.7)
LDLC SERPL CALC-MCNC: 97 MG/DL
MCH RBC QN AUTO: 30.3 PG (ref 26.5–33)
MCHC RBC AUTO-ENTMCNC: 33.2 G/DL (ref 31.5–36.5)
MCV RBC AUTO: 91 FL (ref 78–100)
NONHDLC SERPL-MCNC: 107 MG/DL
PLATELET # BLD AUTO: 232 10E3/UL (ref 150–450)
POTASSIUM SERPL-SCNC: 4 MMOL/L (ref 3.4–5.3)
PROT SERPL-MCNC: 7.5 G/DL (ref 6.4–8.3)
RBC # BLD AUTO: 4.46 10E6/UL (ref 3.8–5.2)
SODIUM SERPL-SCNC: 139 MMOL/L (ref 135–145)
T4 FREE SERPL-MCNC: 1.25 NG/DL (ref 0.9–1.7)
TRIGL SERPL-MCNC: 51 MG/DL
TSH SERPL DL<=0.005 MIU/L-ACNC: 4.63 UIU/ML (ref 0.3–4.2)
WBC # BLD AUTO: 6 10E3/UL (ref 4–11)

## 2024-07-22 PROCEDURE — 84443 ASSAY THYROID STIM HORMONE: CPT

## 2024-07-22 PROCEDURE — 84439 ASSAY OF FREE THYROXINE: CPT

## 2024-07-22 PROCEDURE — 80061 LIPID PANEL: CPT

## 2024-07-22 PROCEDURE — 85027 COMPLETE CBC AUTOMATED: CPT

## 2024-07-22 PROCEDURE — 36415 COLL VENOUS BLD VENIPUNCTURE: CPT

## 2024-07-22 PROCEDURE — 80053 COMPREHEN METABOLIC PANEL: CPT

## 2024-09-17 DIAGNOSIS — E03.9 HYPOTHYROIDISM, UNSPECIFIED TYPE: ICD-10-CM

## 2024-09-18 RX ORDER — LEVOTHYROXINE SODIUM 75 UG/1
TABLET ORAL
Qty: 90 TABLET | Refills: 0 | Status: SHIPPED | OUTPATIENT
Start: 2024-09-18

## 2024-11-01 SDOH — HEALTH STABILITY: PHYSICAL HEALTH: ON AVERAGE, HOW MANY DAYS PER WEEK DO YOU ENGAGE IN MODERATE TO STRENUOUS EXERCISE (LIKE A BRISK WALK)?: 4 DAYS

## 2024-11-01 SDOH — HEALTH STABILITY: PHYSICAL HEALTH: ON AVERAGE, HOW MANY MINUTES DO YOU ENGAGE IN EXERCISE AT THIS LEVEL?: 20 MIN

## 2024-11-01 ASSESSMENT — SOCIAL DETERMINANTS OF HEALTH (SDOH): HOW OFTEN DO YOU GET TOGETHER WITH FRIENDS OR RELATIVES?: ONCE A WEEK

## 2024-11-05 ENCOUNTER — OFFICE VISIT (OUTPATIENT)
Dept: FAMILY MEDICINE | Facility: CLINIC | Age: 39
End: 2024-11-05
Attending: FAMILY MEDICINE
Payer: COMMERCIAL

## 2024-11-05 VITALS
TEMPERATURE: 97.9 F | OXYGEN SATURATION: 100 % | WEIGHT: 191.56 LBS | SYSTOLIC BLOOD PRESSURE: 116 MMHG | BODY MASS INDEX: 27.42 KG/M2 | DIASTOLIC BLOOD PRESSURE: 84 MMHG | HEART RATE: 90 BPM | HEIGHT: 70 IN | RESPIRATION RATE: 17 BRPM

## 2024-11-05 DIAGNOSIS — E03.9 HYPOTHYROIDISM, UNSPECIFIED TYPE: ICD-10-CM

## 2024-11-05 DIAGNOSIS — D68.59 PRIMARY HYPERCOAGULABLE STATE (H): ICD-10-CM

## 2024-11-05 DIAGNOSIS — Z00.00 ROUTINE GENERAL MEDICAL EXAMINATION AT A HEALTH CARE FACILITY: Primary | ICD-10-CM

## 2024-11-05 DIAGNOSIS — B00.1 RECURRENT COLD SORES: ICD-10-CM

## 2024-11-05 PROCEDURE — 99213 OFFICE O/P EST LOW 20 MIN: CPT | Mod: 25 | Performed by: FAMILY MEDICINE

## 2024-11-05 PROCEDURE — 91320 SARSCV2 VAC 30MCG TRS-SUC IM: CPT | Performed by: FAMILY MEDICINE

## 2024-11-05 PROCEDURE — 90480 ADMN SARSCOV2 VAC 1/ONLY CMP: CPT | Performed by: FAMILY MEDICINE

## 2024-11-05 PROCEDURE — 99396 PREV VISIT EST AGE 40-64: CPT | Performed by: FAMILY MEDICINE

## 2024-11-05 RX ORDER — ACYCLOVIR 400 MG/1
400 TABLET ORAL 2 TIMES DAILY
Qty: 30 TABLET | Refills: 1 | Status: SHIPPED | OUTPATIENT
Start: 2024-11-05

## 2024-11-05 RX ORDER — LEVOTHYROXINE SODIUM 75 UG/1
TABLET ORAL
Qty: 90 TABLET | Refills: 3 | Status: SHIPPED | OUTPATIENT
Start: 2024-11-05

## 2024-11-05 ASSESSMENT — PAIN SCALES - GENERAL: PAINLEVEL_OUTOF10: NO PAIN (0)

## 2024-11-05 NOTE — PATIENT INSTRUCTIONS
Patient Education   Preventive Care Advice   This is general advice given by our system to help you stay healthy. However, your care team may have specific advice just for you. Please talk to your care team about your preventive care needs.  Nutrition  Eat 5 or more servings of fruits and vegetables each day.  Try wheat bread, brown rice and whole grain pasta (instead of white bread, rice, and pasta).  Get enough calcium and vitamin D. Check the label on foods and aim for 100% of the RDA (recommended daily allowance).  Lifestyle  Exercise at least 150 minutes each week  (30 minutes a day, 5 days a week).  Do muscle strengthening activities 2 days a week. These help control your weight and prevent disease.  No smoking.  Wear sunscreen to prevent skin cancer.  Have a dental exam and cleaning every 6 months.  Yearly exams  See your health care team every year to talk about:  Any changes in your health.  Any medicines your care team has prescribed.  Preventive care, family planning, and ways to prevent chronic diseases.  Shots (vaccines)   HPV shots (up to age 26), if you've never had them before.  Hepatitis B shots (up to age 59), if you've never had them before.  COVID-19 shot: Get this shot when it's due.  Flu shot: Get a flu shot every year.  Tetanus shot: Get a tetanus shot every 10 years.  Pneumococcal, hepatitis A, and RSV shots: Ask your care team if you need these based on your risk.  Shingles shot (for age 50 and up)  General health tests  Diabetes screening:  Starting at age 35, Get screened for diabetes at least every 3 years.  If you are younger than age 35, ask your care team if you should be screened for diabetes.  Cholesterol test: At age 39, start having a cholesterol test every 5 years, or more often if advised.  Bone density scan (DEXA): At age 50, ask your care team if you should have this scan for osteoporosis (brittle bones).  Hepatitis C: Get tested at least once in your life.  STIs (sexually  transmitted infections)  Before age 24: Ask your care team if you should be screened for STIs.  After age 24: Get screened for STIs if you're at risk. You are at risk for STIs (including HIV) if:  You are sexually active with more than one person.  You don't use condoms every time.  You or a partner was diagnosed with a sexually transmitted infection.  If you are at risk for HIV, ask about PrEP medicine to prevent HIV.  Get tested for HIV at least once in your life, whether you are at risk for HIV or not.  Cancer screening tests  Cervical cancer screening: If you have a cervix, begin getting regular cervical cancer screening tests starting at age 21.  Breast cancer scan (mammogram): If you've ever had breasts, begin having regular mammograms starting at age 40. This is a scan to check for breast cancer.  Colon cancer screening: It is important to start screening for colon cancer at age 45.  Have a colonoscopy test every 10 years (or more often if you're at risk) Or, ask your provider about stool tests like a FIT test every year or Cologuard test every 3 years.  To learn more about your testing options, visit:   .  For help making a decision, visit:   https://bit.ly/tc49697.  Prostate cancer screening test: If you have a prostate, ask your care team if a prostate cancer screening test (PSA) at age 55 is right for you.  Lung cancer screening: If you are a current or former smoker ages 50 to 80, ask your care team if ongoing lung cancer screenings are right for you.  For informational purposes only. Not to replace the advice of your health care provider. Copyright   2023 [employer]. All rights reserved. Clinically reviewed by the [facility]. [employer] [id number] - REV 01/24.

## 2024-11-05 NOTE — PROGRESS NOTES
"Preventive Care Visit  [facility] CYNDIE Hdez MD, Family Medicine  Nov 5, 2024      Assessment & Plan     Routine general medical examination at a health care facility  See counseling messages     Primary hypercoagulable state (H)  Following with hematology.   No changes. No concerns.     Hypothyroidism, unspecified type  Doing well. Well controlled.   Reviewed labs from July.   Tolerating medication.  No change in plan.    - levothyroxine (SYNTHROID/LEVOTHROID) 75 MCG tablet; TAKE 1 TABLET(75 MCG) BY MOUTH DAILY    Recurrent cold sores  Having less frequent outbreaks.   Would like a refill of medication.   Refills sent.   - acyclovir (ZOVIRAX) 400 MG tablet; Take 1 tablet (400 mg) by mouth 2 times daily.            BMI  Estimated body mass index is 27.88 kg/m  as calculated from the following:    Height as of this encounter: 1.765 m (5' 9.5\").    Weight as of this encounter: 86.9 kg (191 lb 9 oz).   Weight management plan: Discussed healthy diet and exercise guidelines  Continue with current plan.     Counseling  Appropriate preventive services were addressed with this patient via screening, questionnaire, or discussion as appropriate for fall prevention, nutrition, physical activity, Tobacco-use cessation, social engagement, weight loss and cognition.  Checklist reviewing preventive services available has been given to the patient.  Reviewed patient's diet, addressing concerns and/or questions.   She is at risk for psychosocial distress and has been provided with information to reduce risk.           Trevon Mejia is a 39 year old, presenting for the following:  Physical        11/5/2024     6:55 AM   Additional Questions   Roomed by VE         11/5/2024     6:55 AM   Patient Reported Additional Medications   Patient reports taking the following new medications Multivitamin          HPI    Primary Hypercoagulable State - seeing hematology. Recent follow up. No changes.     Cold " sores - less frequent. Uses medication when needed.     Hypothyroidism Follow-up    Since last visit, patient describes the following symptoms: Weight stable, no hair loss, no skin changes, no constipation, no loose stools      Health Care Directive  Patient does not have a Health Care Directive: Patient states has Advance Directive and will bring in a copy to clinic.      11/1/2024   General Health   How would you rate your overall physical health? Good   Feel stress (tense, anxious, or unable to sleep) Only a little      (!) STRESS CONCERN      11/1/2024   Nutrition   Three or more servings of calcium each day? Yes   Diet: Breakfast skipped   How many servings of fruit and vegetables per day? (!) 2-3   How many sweetened beverages each day? 0-1            11/1/2024   Exercise   Days per week of moderate/strenous exercise 4 days   Average minutes spent exercising at this level 20 min            11/1/2024   Social Factors   Frequency of gathering with friends or relatives Once a week   Worry food won't last until get money to buy more No   Food not last or not have enough money for food? No   Do you have housing? (Housing is defined as stable permanent housing and does not include staying ouside in a car, in a tent, in an abandoned building, in an overnight shelter, or couch-surfing.) Yes   Are you worried about losing your housing? No   Lack of transportation? No   Unable to get utilities (heat,electricity)? No            11/1/2024   Dental   Dentist two times every year? Yes            11/1/2024   TB Screening   Were you born outside of the [place]? No            Today's PHQ-2 Score:       11/4/2024     7:27 PM   PHQ-2 ( 1999 [employer])   Q1: Little interest or pleasure in doing things 0    Q2: Feeling down, depressed or hopeless 0    PHQ-2 Score 0    Q1: Little interest or pleasure in doing things Not at all   Q2: Feeling down, depressed or hopeless Not at all   PHQ-2 Score 0       Patient-reported           11/1/2024    Substance Use   Alcohol more than 3/day or more than 7/wk No   Do you use any other substances recreationally? No        Social History     Tobacco Use    Smoking status: Former     Current packs/day: 0.00     Types: Cigarettes     Start date: 2011     Quit date: 2020     Years since quittin.1     Passive exposure: Past    Smokeless tobacco: Never    Tobacco comments:     Smoked infrequently but now haven't smoked in approximately a year.   Vaping Use    Vaping status: Never Used   Substance Use Topics    Alcohol use: Yes     Comment: occasional- 2-3 on weekends     Drug use: Not Currently     Types: Marijuana     Comment: Haven't used for a few years           2023   LAST FHS-7 RESULTS   1st degree relative breast or ovarian cancer No   Any relative bilateral breast cancer No   Any male have breast cancer No   Any ONE woman have BOTH breast AND ovarian cancer No   Any woman with breast cancer before 50yrs No   2 or more relatives with breast AND/OR ovarian cancer No   2 or more relatives with breast AND/OR bowel cancer No           Mammogram Screening - Mammogram every 1-2 years updated in Health Maintenance based on mutual decision making        2024   STI Screening   New sexual partner(s) since last STI/HIV test? No        History of abnormal Pap smear: No - age 30- 64 PAP with HPV every 5 years recommended        Latest Ref Rng & Units 2022     7:06 AM 5/10/2017     1:05 PM 5/10/2017     9:28 AM   PAP / HPV   PAP  Negative for Intraepithelial Lesion or Malignancy (NILM)      PAP (Historical)    NIL    HPV 16 DNA Negative Negative  Negative     HPV 18 DNA Negative Negative  Negative     Other HR HPV Negative Negative  Negative       ASCVD Risk   The 10-year ASCVD risk score (Mickey LIM, et al., 2019) is: 0.2%    Values used to calculate the score:      Age: 39 years      Sex: Female      Is Non- : No      Diabetic: No      Tobacco smoker: No       "Systolic Blood Pressure: 116 mmHg      Is BP treated: No      HDL Cholesterol: 74 mg/dL      Total Cholesterol: 181 mg/dL        11/1/2024   Contraception/Family Planning   Questions about contraception or family planning No           Reviewed and updated as needed this visit by Provider                    BP Readings from Last 3 Encounters:   11/05/24 116/84   02/06/24 114/81   01/26/24 129/87    Wt Readings from Last 3 Encounters:   11/05/24 86.9 kg (191 lb 9 oz)   07/08/24 85.3 kg (188 lb)   02/06/24 89.8 kg (198 lb)                         Objective    Exam  /84 (BP Location: Left arm, Patient Position: Chair, Cuff Size: Adult Regular)   Pulse 90   Temp 97.9  F (36.6  C) (Temporal)   Resp 17   Ht 1.765 m (5' 9.5\")   Wt 86.9 kg (191 lb 9 oz)   LMP 10/25/2024 (Exact Date)   SpO2 100%   Breastfeeding No   BMI 27.88 kg/m     Estimated body mass index is 27.88 kg/m  as calculated from the following:    Height as of this encounter: 1.765 m (5' 9.5\").    Weight as of this encounter: 86.9 kg (191 lb 9 oz).    Physical Exam  GENERAL: alert and no distress  EYES: Eyes grossly normal to inspection, PERRL and conjunctivae and sclerae normal  HENT: ear canals and TM's normal, nose and mouth without ulcers or lesions  NECK: no adenopathy, no asymmetry, masses, or scars  RESP: lungs clear to auscultation - no rales, rhonchi or wheezes  BREAST: normal without masses, tenderness or nipple discharge and no palpable axillary masses or adenopathy  CV: regular rate and rhythm, normal S1 S2, no S3 or S4, no murmur, click or rub, no peripheral edema  ABDOMEN: soft, nontender, no hepatosplenomegaly, no masses and bowel sounds normal  MS: no gross musculoskeletal defects noted, no edema  SKIN: no suspicious lesions or rashes  NEURO: Normal strength and tone, mentation intact and speech normal  PSYCH: mentation appears normal, affect normal/bright        Signed Electronically by: Sabrina Hdez MD    "

## 2024-12-10 DIAGNOSIS — B00.1 RECURRENT COLD SORES: ICD-10-CM

## 2024-12-10 RX ORDER — ACYCLOVIR 400 MG/1
400 TABLET ORAL 2 TIMES DAILY
Qty: 180 TABLET | Refills: 2 | Status: SHIPPED | OUTPATIENT
Start: 2024-12-10

## 2025-01-07 ENCOUNTER — ANCILLARY PROCEDURE (OUTPATIENT)
Dept: MAMMOGRAPHY | Facility: CLINIC | Age: 40
End: 2025-01-07
Attending: FAMILY MEDICINE
Payer: COMMERCIAL

## 2025-01-07 DIAGNOSIS — Z12.31 VISIT FOR SCREENING MAMMOGRAM: ICD-10-CM

## 2025-01-07 PROCEDURE — 77063 BREAST TOMOSYNTHESIS BI: CPT | Mod: GC

## 2025-01-07 PROCEDURE — 77067 SCR MAMMO BI INCL CAD: CPT | Mod: GC

## 2025-01-17 ENCOUNTER — ANCILLARY PROCEDURE (OUTPATIENT)
Dept: MAMMOGRAPHY | Facility: CLINIC | Age: 40
End: 2025-01-17
Attending: FAMILY MEDICINE
Payer: COMMERCIAL

## 2025-01-17 ENCOUNTER — ANCILLARY PROCEDURE (OUTPATIENT)
Dept: ULTRASOUND IMAGING | Facility: CLINIC | Age: 40
End: 2025-01-17
Attending: FAMILY MEDICINE
Payer: COMMERCIAL

## 2025-01-17 DIAGNOSIS — R92.8 ABNORMAL MAMMOGRAM: ICD-10-CM

## 2025-01-17 PROCEDURE — 77065 DX MAMMO INCL CAD UNI: CPT | Mod: LT | Performed by: RADIOLOGY

## 2025-01-17 PROCEDURE — 76642 ULTRASOUND BREAST LIMITED: CPT | Mod: LT | Performed by: RADIOLOGY

## 2025-01-17 PROCEDURE — G0279 TOMOSYNTHESIS, MAMMO: HCPCS | Performed by: RADIOLOGY

## 2025-05-05 ENCOUNTER — ANCILLARY PROCEDURE (OUTPATIENT)
Dept: GENERAL RADIOLOGY | Facility: CLINIC | Age: 40
End: 2025-05-05
Attending: PEDIATRICS
Payer: COMMERCIAL

## 2025-05-05 ENCOUNTER — OFFICE VISIT (OUTPATIENT)
Dept: ORTHOPEDICS | Facility: CLINIC | Age: 40
End: 2025-05-05
Payer: COMMERCIAL

## 2025-05-05 DIAGNOSIS — M25.562 LEFT KNEE PAIN: ICD-10-CM

## 2025-05-05 DIAGNOSIS — G89.29 CHRONIC PAIN OF LEFT KNEE: Primary | ICD-10-CM

## 2025-05-05 DIAGNOSIS — M25.562 CHRONIC PAIN OF LEFT KNEE: Primary | ICD-10-CM

## 2025-05-05 PROCEDURE — 99204 OFFICE O/P NEW MOD 45 MIN: CPT | Performed by: PEDIATRICS

## 2025-05-05 PROCEDURE — 73562 X-RAY EXAM OF KNEE 3: CPT | Mod: TC | Performed by: RADIOLOGY

## 2025-05-05 NOTE — PROGRESS NOTES
ASSESSMENT & PLAN    Jackie was seen today for pain.    Diagnoses and all orders for this visit:    Chronic pain of left knee  -     XR Knee Standing AP Durbin Bilat Lat Left; Future  -     Physical Therapy  Referral; Future      This issue is acute on chronic and Unchanged.        ICD-10-CM    1. Chronic pain of left knee  M25.562 XR Knee Standing AP Durbin Bilat Lat Left    G89.29 Physical Therapy  Referral          We discussed these other possible diagnosis: Left knee pain, concern for PF maltracking, possible medial meniscal tear.  We discussed the following treatment options: symptom treatment, activity modification/rest, imaging, rehab and referral. Following discussion, plan: will start with supportive care, physical therapy, close follow up if not improving.    Plan:  - Today's Plan of Care:  Rehab: Physical Therapy: [facility] - [phone]    Discussed activity considerations and other supportive care including Ice/Heat, OTC and other topical medications as needed.    -We also discussed other future treatment options:  MRI if not improving    Follow Up: ~ 6 weeks    Concerning signs and symptoms were reviewed and all questions were answered at this time.    Neris Romero MD [facility]  Sports Medicine Physician  [facility]      -----  Chief Complaint   Patient presents with    Left Knee - Pain       SUBJECTIVE  Jackie Juan is a/an 39 year old female who is seen as a self referral for evaluation of LEFT knee.     The patient is seen by themselves.    Onset: 4 week(s) ago. Reports insidious onset without acute precipitating event. Pain comes and goes  Location of Pain: left knee, medial side, medial patella   Worsened by: twisting of the tibia, internal and external rotation, descending stairs, getting up from low seated spot  Better with: icing  Treatments tried: ice and knee sleeve   Associated symptoms: numbness, weakness of left knee, and sharp stabbing  pain    Orthopedic/Surgical history: YES - Date: states she fell on her knees 1 year ago and had a significant bruise over the knee medially, but didn't hurt right away.  Social History/Occupation: working at a computer.       REVIEW OF SYSTEMS:  Review of Systems    OBJECTIVE:  There were no vitals taken for this visit.   General: healthy, alert and in no distress  Skin: no suspicious lesions or rash.  CV: distal perfusion intact   Resp: normal respiratory effort without conversational dyspnea   Psych: normal mood and affect  Gait: NORMAL  Neuro: Normal light sensory exam of lower extremity    Left Knee exam    Inspection:      no effusion, swelling of bruising left    Patella:      Normal patellar tracking noted through range of motion left    Tender:      medial patellar border left       medial joint line left    Non Tender:      remainder of knee area left    Knee ROM:      Full active and passive ROM with flexion and extension bilateral    Hip ROM:     Full active and passive ROM bilateral    Strength:      5/5 with knee extension left    Special Tests:     some pain with Octaviano left       neg (-) anterior drawer left       neg (-) posterior drawer left       neg (-) varus at 0 deg and 30 deg left       neg (-) valgus at 0 deg and 30 deg left    Gait:      normal    Neurovascular:      2+ peripheral pulses bilaterally and brisk capillary refill       sensation grossly intact     RADIOLOGY:  Final results and radiologist's interpretation, available in the [facility] health record.  Images were reviewed with the patient in the office today.  My personal interpretation of the performed imaging:    AP and sunrise bilateral and left lateral XR views of knees reviewed: no acute bony abnormality, no significant degenerative change  - will follow official read      Review of the result(s) of each unique test - XR

## 2025-05-05 NOTE — LETTER
5/5/2025      Jackie Juan  [id number] [facility] [address]      Dear Colleague,    Thank you for referring your patient, Jackie Juan, to the [facility]. Please see a copy of my visit note below.    ASSESSMENT & PLAN    Jackie was seen today for pain.    Diagnoses and all orders for this visit:    Chronic pain of left knee  -     XR Knee Standing AP Op[place]ke Bilat Lat Left; Future  -     Physical Therapy  Referral; Future      This issue is acute on chronic and Unchanged.        ICD-10-CM    1. Chronic pain of left knee  M25.562 XR Knee Standing AP Op[place]ke Bilat Lat Left    G89.29 Physical Therapy  Referral          We discussed these other possible diagnosis: Left knee pain, concern for PF maltracking, possible medial meniscal tear.  We discussed the following treatment options: symptom treatment, activity modification/rest, imaging, rehab and referral. Following discussion, plan: will start with supportive care, physical therapy, close follow up if not improving.    Plan:  - Today's Plan of Care:  Rehab: Physical Therapy: [facility] - [phone]    Discussed activity considerations and other supportive care including Ice/Heat, OTC and other topical medications as needed.    -We also discussed other future treatment options:  MRI if not improving    Follow Up: ~ 6 weeks    Concerning signs and symptoms were reviewed and all questions were answered at this time.    Neris Romero MD [facility]  Sports Medicine Physician  [facility]      -----  Chief Complaint   Patient presents with     Left Knee - Pain       SUBJECTIVE  Jackie Juan is a/an 39 year old female who is seen as a self referral for evaluation of LEFT knee.     The patient is seen by themselves.    Onset: 4 week(s) ago. Reports insidious onset without acute precipitating event. Pain comes and goes  Location of Pain: left knee, medial side, medial patella   Worsened by:  twisting of the tibia, internal and external rotation, descending stairs, getting up from low seated spot  Better with: icing  Treatments tried: ice and knee sleeve   Associated symptoms: numbness, weakness of left knee, and sharp stabbing pain    Orthopedic/Surgical history: YES - Date: states she fell on her knees 1 year ago and had a significant bruise over the knee medially, but didn't hurt right away.  Social History/Occupation: working at a computer.       REVIEW OF SYSTEMS:  Review of Systems    OBJECTIVE:  There were no vitals taken for this visit.   General: healthy, alert and in no distress  Skin: no suspicious lesions or rash.  CV: distal perfusion intact   Resp: normal respiratory effort without conversational dyspnea   Psych: normal mood and affect  Gait: NORMAL  Neuro: Normal light sensory exam of lower extremity    Left Knee exam    Inspection:      no effusion, swelling of bruising left    Patella:      Normal patellar tracking noted through range of motion left    Tender:      medial patellar border left       medial joint line left    Non Tender:      remainder of knee area left    Knee ROM:      Full active and passive ROM with flexion and extension bilateral    Hip ROM:     Full active and passive ROM bilateral    Strength:      5/5 with knee extension left    Special Tests:     some pain with Octaviano left       neg (-) anterior drawer left       neg (-) posterior drawer left       neg (-) varus at 0 deg and 30 deg left       neg (-) valgus at 0 deg and 30 deg left    Gait:      normal    Neurovascular:      2+ peripheral pulses bilaterally and brisk capillary refill       sensation grossly intact     RADIOLOGY:  Final results and radiologist's interpretation, available in the [facility] health record.  Images were reviewed with the patient in the office today.  My personal interpretation of the performed imaging:    AP and sunrise bilateral and left lateral XR views of knees reviewed: no acute bony  abnormality, no significant degenerative change  - will follow official read      Review of the result(s) of each unique test - XR             Again, thank you for allowing me to participate in the care of your patient.        Sincerely,        Neris Romero MD    Electronically signed

## 2025-05-05 NOTE — PATIENT INSTRUCTIONS
We discussed these other possible diagnosis: Left knee pain, concern for PF maltracking, possible medial meniscal tear.  We discussed the following treatment options: symptom treatment, activity modification/rest, imaging, rehab and referral. Following discussion, plan: will start with supportive care, physical therapy, close follow up if not improving.    Plan:  - Today's Plan of Care:  Rehab: Physical Therapy: [facility] - [phone]    Discussed activity considerations and other supportive care including Ice/Heat, OTC and other topical medications as needed.    -We also discussed other future treatment options:  MRI if not improving    Follow Up: ~ 6 weeks    If you have any further questions for your physician or physician s care team you can call [phone].     Dr. Romero will be out on an extended ESAU for June, July, August & will be returning in September 2025.      If you are a patient requesting to be seen follow up, not specifically desiring/requesting an ultrasound guided procedure, you are free schedule with any of the Sports Medicine providers within the [facility] group.  A full list of our providers & their practice locations can be found at [facility].  Our central scheduling number is [phone] to assist in scheduling.  The below providers work directly with Dr. Romero at the [facility] & [facility].    DO Jeovany Carlin MD Christian Matthews, MD Scott Repa, DO      Please feel free to reach out to team directly with any other specific questions or concerns regarding your care.

## 2025-05-07 ASSESSMENT — ACTIVITIES OF DAILY LIVING (ADL)
KNEE_ACTIVITY_OF_DAILY_LIVING_SCORE: 85.71
SQUAT: ACTIVITY IS SOMEWHAT DIFFICULT
AS_A_RESULT_OF_YOUR_KNEE_INJURY,_HOW_WOULD_YOU_RATE_YOUR_CURRENT_LEVEL_OF_DAILY_ACTIVITY?: NEARLY NORMAL
STIFFNESS: I DO NOT HAVE THE SYMPTOM
RISE FROM A CHAIR: ACTIVITY IS MINIMALLY DIFFICULT
RAW_SCORE: 60
PAIN: THE SYMPTOM AFFECTS MY ACTIVITY SLIGHTLY
HOW_WOULD_YOU_RATE_THE_CURRENT_FUNCTION_OF_YOUR_KNEE_DURING_YOUR_USUAL_DAILY_ACTIVITIES_ON_A_SCALE_FROM_0_TO_100_WITH_100_BEING_YOUR_LEVEL_OF_KNEE_FUNCTION_PRIOR_TO_YOUR_INJURY_AND_0_BEING_THE_INABILITY_TO_PERFORM_ANY_OF_YOUR_USUAL_DAILY_ACTIVITIES?: 85
LIMPING: I DO NOT HAVE THE SYMPTOM
KNEEL ON THE FRONT OF YOUR KNEE: ACTIVITY IS SOMEWHAT DIFFICULT
GO UP STAIRS: ACTIVITY IS MINIMALLY DIFFICULT
KNEE_ACTIVITY_OF_DAILY_LIVING_SUM: 60
SWELLING: I DO NOT HAVE THE SYMPTOM
WALK: ACTIVITY IS NOT DIFFICULT
PLEASE_INDICATE_YOR_PRIMARY_REASON_FOR_REFERRAL_TO_THERAPY:: KNEE
GIVING WAY, BUCKLING OR SHIFTING OF KNEE: I DO NOT HAVE THE SYMPTOM
WEAKNESS: I DO NOT HAVE THE SYMPTOM
HOW_WOULD_YOU_RATE_THE_CURRENT_FUNCTION_OF_YOUR_KNEE_DURING_YOUR_USUAL_DAILY_ACTIVITIES_ON_A_SCALE_FROM_0_TO_100_WITH_100_BEING_YOUR_LEVEL_OF_KNEE_FUNCTION_PRIOR_TO_YOUR_INJURY_AND_0_BEING_THE_INABILITY_TO_PERFORM_ANY_OF_YOUR_USUAL_DAILY_ACTIVITIES?: 85
STAND: ACTIVITY IS NOT DIFFICULT
SIT WITH YOUR KNEE BENT: ACTIVITY IS NOT DIFFICULT
HOW_WOULD_YOU_RATE_THE_OVERALL_FUNCTION_OF_YOUR_KNEE_DURING_YOUR_USUAL_DAILY_ACTIVITIES?: NEARLY NORMAL
SWELLING: I DO NOT HAVE THE SYMPTOM
WEAKNESS: I DO NOT HAVE THE SYMPTOM
STAND: ACTIVITY IS NOT DIFFICULT
SQUAT: ACTIVITY IS SOMEWHAT DIFFICULT
GIVING WAY, BUCKLING OR SHIFTING OF KNEE: I DO NOT HAVE THE SYMPTOM
RISE FROM A CHAIR: ACTIVITY IS MINIMALLY DIFFICULT
PAIN: THE SYMPTOM AFFECTS MY ACTIVITY SLIGHTLY
GO DOWN STAIRS: ACTIVITY IS SOMEWHAT DIFFICULT
STIFFNESS: I DO NOT HAVE THE SYMPTOM
GO UP STAIRS: ACTIVITY IS MINIMALLY DIFFICULT
GO DOWN STAIRS: ACTIVITY IS SOMEWHAT DIFFICULT
AS_A_RESULT_OF_YOUR_KNEE_INJURY,_HOW_WOULD_YOU_RATE_YOUR_CURRENT_LEVEL_OF_DAILY_ACTIVITY?: NEARLY NORMAL
HOW_WOULD_YOU_RATE_THE_OVERALL_FUNCTION_OF_YOUR_KNEE_DURING_YOUR_USUAL_DAILY_ACTIVITIES?: NEARLY NORMAL
SIT WITH YOUR KNEE BENT: ACTIVITY IS NOT DIFFICULT
WALK: ACTIVITY IS NOT DIFFICULT
KNEEL ON THE FRONT OF YOUR KNEE: ACTIVITY IS SOMEWHAT DIFFICULT
LIMPING: I DO NOT HAVE THE SYMPTOM

## 2025-05-08 ENCOUNTER — THERAPY VISIT (OUTPATIENT)
Dept: PHYSICAL THERAPY | Facility: CLINIC | Age: 40
End: 2025-05-08
Attending: PEDIATRICS
Payer: COMMERCIAL

## 2025-05-08 DIAGNOSIS — G89.29 CHRONIC PAIN OF LEFT KNEE: ICD-10-CM

## 2025-05-08 DIAGNOSIS — M25.562 CHRONIC PAIN OF LEFT KNEE: ICD-10-CM

## 2025-05-08 NOTE — PROGRESS NOTES
PHYSICAL THERAPY EVALUATION  Type of Visit: Evaluation       Fall Risk Screen:  Have you fallen 2 or more times in the past year?: No  Have you fallen and had an injury in the past year?: No    Subjective         Presenting condition or subjective complaint: Im having somewhat intermittent pain in my knee particularly when bending or twisting.  Date of onset:      Relevant medical history: DVT (blood clot); Overweight; Thyroid problems   Dates & types of surgery: 2024 laparoscopic surgery for cyst removal    Prior diagnostic imaging/testing results: X-ray     Prior therapy history for the same diagnosis, illness or injury: No      Prior Level of Function  Transfers: Independent  Ambulation: Independent  ADL: Independent  IADL:     Living Environment  Social support: With a significant other or spouse   Type of home: [facility]   Stairs to enter the home: No       Ramp: No   Stairs inside the home: Yes 8 Is there a railing: Yes     Help at home: None  Equipment owned:       Employment: Yes   Hobbies/Interests: Crafts, walking, biking, pickleball (recent hobby)    Patient goals for therapy: Go down stairs or stand up from sitting position without knee pain    Pain assessment: Location: Left knee medial and anterior /Ratin-8/10-will be a sharp pain at times but decreases quickly     Objective   KNEE EVALUATION  PAIN: Pain Level at Rest: 0/10  Pain Level with Use: 8/10  Pain is Exacerbated By: bending; transfers;   Pain is Relieved By: rest  INTEGUMENTARY (edema, incisions):   POSTURE:   GAIT:  Weightbearing Status:   Assistive Device(s):   Gait Deviations:   BALANCE/PROPRIOCEPTION:   WEIGHTBEARING ALIGNMENT:   NON-WEIGHTBEARING ALIGNMENT:   ROM: pain free with all motions  (Degrees) Left AROM Left PROM  Right AROM Right PROM   Knee Flexion 145 142     Knee Extension -18 -20     Pain:   End feel:     STRENGTH:   Pain: - none + mild ++ moderate +++ severe  Strength Scale: 0-5/5 Left Right   Knee  Flexion 4- 5   Knee Extension 4- 5   Quad Set 4 5   Hip Abduction 4-/5 5/5   Hip Extension 4-/5 5/5     FLEXIBILITY:   SPECIAL TESTS:    Left Right   Apley's (Meniscus)     Octaviano's (Meniscus) Positive Negative    Layo's (ITB/TFL)     Patellar Apprehension Test     Patella Tracking J-tracking J-tracking   Ligamentous Stability     Anterior Drawer (ACL)     Posterior Drawer (PCL)     Prone Dial Test at 30 Deg and 90 Deg (PCL/PLC)     Valgus Stress Testing at 0 Deg and 30 Deg     Varus Stress Testing at 0 Deg and 30 Deg        FUNCTIONAL TESTS:   PALPATION:   JOINT MOBILITY:     Assessment & Plan   CLINICAL IMPRESSIONS  Medical Diagnosis: Left knee    Treatment Diagnosis:     Impression/Assessment: Patient is a 39 year old female with left knee complaints.  The following significant findings have been identified: Pain, Decreased strength, Impaired muscle performance, and Decreased activity tolerance. These impairments interfere with their ability to perform self care tasks, work tasks, recreational activities, and household chores as compared to previous level of function.     Clinical Decision Making (Complexity):  Clinical Presentation: Stable/Uncomplicated  Clinical Presentation Rationale: based on medical and personal factors listed in PT evaluation  Clinical Decision Making (Complexity): Low complexity    PLAN OF CARE  Treatment Interventions:  Interventions: Manual Therapy, Neuromuscular Re-education, Therapeutic Activity, Therapeutic Exercise    Long Term Goals     PT Goal 1  Goal Identifier: Transfers  Goal Description: Able to complete transfers painfree; no catching in the knee  Rationale: to maximize safety and independence with performance of ADLs and functional tasks  Goal Progress: 2-8/10 PL with transfers (sit to stand) (squatting)  Target Date: 07/31/25      Frequency of Treatment: 2x month  Duration of Treatment: 3 months    Recommended Referrals to Other Professionals:   Education Assessment:         Risks and benefits of evaluation/treatment have been explained.   Patient/Family/caregiver agrees with Plan of Care.     Evaluation Time:     PT Khadar, Low Complexity Minutes (16922): 15       Signing Clinician: Hilary Robison PT

## 2025-06-02 ENCOUNTER — TELEPHONE (OUTPATIENT)
Dept: HEMATOLOGY | Facility: CLINIC | Age: 40
End: 2025-06-02
Payer: COMMERCIAL

## 2025-06-04 ENCOUNTER — THERAPY VISIT (OUTPATIENT)
Dept: PHYSICAL THERAPY | Facility: CLINIC | Age: 40
End: 2025-06-04
Payer: COMMERCIAL

## 2025-06-04 DIAGNOSIS — G89.29 CHRONIC PAIN OF LEFT KNEE: Primary | ICD-10-CM

## 2025-06-04 DIAGNOSIS — M25.562 CHRONIC PAIN OF LEFT KNEE: Primary | ICD-10-CM

## 2025-06-04 PROCEDURE — 97110 THERAPEUTIC EXERCISES: CPT | Mod: GP | Performed by: PHYSICAL THERAPY ASSISTANT

## 2025-06-25 ENCOUNTER — THERAPY VISIT (OUTPATIENT)
Dept: PHYSICAL THERAPY | Facility: CLINIC | Age: 40
End: 2025-06-25
Payer: COMMERCIAL

## 2025-06-25 DIAGNOSIS — G89.29 CHRONIC PAIN OF LEFT KNEE: Primary | ICD-10-CM

## 2025-06-25 DIAGNOSIS — M25.562 CHRONIC PAIN OF LEFT KNEE: Primary | ICD-10-CM

## 2025-06-25 PROCEDURE — 97110 THERAPEUTIC EXERCISES: CPT | Mod: GP | Performed by: PHYSICAL THERAPIST

## 2025-06-25 NOTE — PROGRESS NOTES
06/25/25 0500   Appointment Info   Signing clinician's name / credentials Hilary Robison DPT   Total/Authorized Visits 6   Visits Used 3   Medical Diagnosis Left knee   Progress Note/Certification   Therapy Frequency 2x month   Predicted Duration 3 months   PT Goal 1   Goal Identifier Transfers   Goal Description Able to complete transfers painfree; no catching in the knee   Rationale to maximize safety and independence with performance of ADLs and functional tasks   Goal Progress 0/10 PL with transfers   Target Date 07/31/25   Date Met 06/25/25   Subjective Report   Subjective Report Patient reports overall the knee is doing overall much better.  Fell about 2 weeks ago playing [employer] ball and tweaked her back causing her to move weird for a few days.  That overall is feeling better but has been slowly progressing back into HEP.  Feels she is ready to be independent and will call with any questiosn or concerns.   Objective Measures   Objective Measures Objective Measure 1   Objective Measure 1   Objective Measure AROM   Details 2-0-143 no pain with ROM   Treatment Interventions (PT)   Interventions Therapeutic Procedure/Exercise   Therapeutic Procedure/Exercise   Therapeutic Procedures: strength, endurance, ROM, flexibility minutes (35106) 40   PTRx Ther Proc 1 Hip Flexion Straight Leg Raise   PTRx Ther Proc 1 - Details progressed to quarters    PTRx Ther Proc 2 Hip Abduction Straight Leg Raise   PTRx Ther Proc 2 - Details progressed to quarters    PTRx Ther Proc 3 Clamshell with Theraband   PTRx Ther Proc 3 - Details progress to side step   PTRx Ther Proc 4 Hip Extension Straight Leg Raise   PTRx Ther Proc 4 - Details progressed to quarters    PTRx Ther Proc 5 Prone Hip Extension With Bent Knee   PTRx Ther Proc 5 - Details 1# x15    PTRx Ther Proc 6 Seated Hamstring Curl with Tubing   PTRx Ther Proc 6 - Details BTB x30   PTRx Ther Proc 7 Side Stepping With Theraband   PTRx Ther Proc 7 - Details RTB 6m x4   "  PTRx Ther Proc 8 Standing Hip Abduction   PTRx Ther Proc 8 - Details RTB x10 each direction B    PTRx Ther Proc 9 Single Leg Bridge   PTRx Ther Proc 9 - Details 5x   PTRx Ther Proc 10 Bridging #1   PTRx Ther Proc 10 - Details x20   PTRx Ther Proc 11 Lateral Step Down   PTRx Ther Proc 11 - Details 6\" 10x   PTRx Ther Proc 12 Squat   PTRx Ther Proc 12 - Details table behine 10x-can progress to band around thights   PTRx Ther Proc 13 Functional Lunge Forward   PTRx Ther Proc 13 - Details 5x B   Skilled Intervention progressed to weight bearing strengthening   Patient Response/Progress poncho well   Plan   Home program printed and sent with blue band for progression for hamstring curls and discussed okay to progress to green band for quarters and side step         DISCHARGE  Reason for Discharge: Patient has met all goals.    Equipment Issued:     Discharge Plan: Patient to continue home program.    Referring Provider:  Neris Romero    "

## 2025-06-25 NOTE — PROGRESS NOTES
[facility]  [address] [place] [id number]  Main: [phone], Fax: [phone]      Telephone (Return) Visit Note:    Patient: Jackie Juan  MRN: [id number]  : 1985  RAMANDEEP: 2025  Location of the patient when this telephone visit was conducted: Patient's home  Location of this writer at the time of this telephone visit was conducted: [facility]    Reason of today's visit:  Right distal lower extremity DVT on 2021.      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 The patient was last seen by this writer back on 2024.      Thrombosis History Summary:  Mid 2021, she flew from [place] to [place] (which was about 1.5 hours flight).   Just a few weeks prior to her diagnosis of right leg DVT in 2021, she recalls that she was using a different setting for her cross-stitching hobby where she basically sit crossed legged with a 2x4 plank on her leg for 3-4 hours at a time.   Then on 2021, she presented to her primary care provider's office for evaluation of a 2 weeks history of intermittent pain over the right calf worse after prolonged ambulation. Ultrasound at the time showed: occlusive thrombus in one of the paired peroneal veins. For this reason, she was placed on rivaroxaban.   Jackie's mother has bilateral lower extremity DVT and bilateral pulmonary embolism back about 20 years ago when she was  in her mid-40's. Her venous thromboembolic event was thought to be unprovoked. She was later found to have factor V Leiden mutation and thus Jackie was tested for this mutation back in 2013 and is found to have heterozygous factor V Leiden mutation. Because of this finding, Jackie has not been on estrogen containing OCP.   12/6/2021, she established care with this writer for which I have determined that her distal right leg DVT in Aug 2021 was at best a minimally provoked event. At the time, I did discussed with her that we should consider keeping her indefinite anticoagulation therapy.   12/14/2021, repeat right leg venous ultrasound showed persistent thrombus in the right peroneal vein in the proximal / mid calf. Thrombus in the peroneal vein in the distal calf now appears to be resolved. I recommended continuation with rivaroxaban for another 3 months.   3/22/2022, repeat ultrasound showed no DVT demonstrated in the right lower extremity. Previously known right peroneal vein thrombus has resolved and the patient has elected to stop anticoagulation therapy at that point.   11/23/2022, she presented to her primary care provider's office with a 3 days complaint of right leg pain. Concern for recurrent DVT, an ultrasound was done which showed occlusive thrombus in the right gastrocnemius vein. She was restarted on rivaroxaban and is referred back to our clinic for consultation.   1/12/2023, re-established care with this writer and recommended that she should remain on indefinite anticoagulation therapy.   1/19/2023, Cardiolipin Abys and beta 2 glycoprotein Abys were negative.   3/23/2023, Repeat right leg venous ultrasound showed no DVT demonstrated in the right leg. Previous right gastrocnemius DVT has cleared.  11/1/2023, had her routine annual exam and reported vaginal spotting.   11/13/2023, Pelvic ultrasound showed a large cystic mass in the left adnexa without apparent connection to the ovary or bladder by  ultrasound.   11/20/2023, Saw Dr. Sam Naqvi of Ob/Gyn.   12/1/2023, pelvic MRI showed a large unilocular cystic mass in the midline pelvis which contains proteinaceous fluid and small amount of layering debris. No suspicious features to suggest malignancy. The mass is separate from the ovaries and of unclear origin.   12/13/2023, she was seen by Dr. Antoni Stover of Gyn Oncology.   1/26/2024, she underwent laparoscopic right salpingo-oophorectomy and left salpingectomy. 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 Final surgical path also showed no malignancy. She held her rivaroxaban for 48 hours prior to procedure and then restarted it the day after her procedure. No bleeding complications.     Interim History:  Currently she is on rivaroxaban at 20 mg PO Qday dosing. Denies any bleeding issues. She has had no invasive or surgical procedures in the past year and is not anticipating any invasive or surgical procedures in this upcoming year.     Jackie reports that she fell while playing Pickleball about 2 weeks ago and landed on her buttock. She did have some pain after the fall but has recovered since.     She also reports that her oldest brother, who is 48 years of age suffered a lower extremity DVT about 3 months ago. The only thing that he thinks was contributing to his DVT was that he just finish a 9 hours long work meeting and sitting for a prolonged period of time. Jackie does not think that he has factor V Leiden mutation testing done in the past. This individual also has 2 daughters age 13 and 16.     ROS:  Denies any bleeding complications. Specifically, no frequent epistaxis. No issues with oral mucosal bleeding. Denies any hematuria or blood in stools. Denies any shortness of breath. No chest pain. No cough. No fever.    Medication, Allergies and PmHx:  All have been reviewed by this writer in the electronic medical records.    Social History and Family  History:  Deferred.    Assessment:  In summary, Jackie is a 39 year old female with a family history of DVT/PE and factor V Leiden mutation, who is known to have heterozygous factor V Leiden mutation, who is found to have a distal right leg DVT back in 8/31/2021 S/P close to 7 months of anticoagulation therapy with rivaroxaban at the time, then recurrent right distal lower extremity DVT on 11/23/2022, currently on indefinite anticoagulation therapy with rivaroxaban at 20 mg PO Qday, participates in today's scheduled video visit for her follow up visit.      Jackie's right distal lower extremity DVT in 8/31/2021 was an unprovoked venous thromboembolic event or may be minimally provoked by the fact that she was sitting cross legged doing cross stitches with a 2x4 plank on her leg for 3-4 hours at a time for the couple weeks prior to her diagnosis of DVT. She completed close to 7 months of anticoagulation therapy with rivaroxaban at the time.      Her 11/23/2022 recurrent right distal leg DVT is also an unprovoked event.      Diagnosis:  Right distal lower extremity DVT on 8/31/2021, likely unprovoked.  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   Heterozygous factor V Leiden mutation.   Family history of DVT/PE.   Family history of factor V Leiden mutation.   Chronic anticoagulation therapy with rivaroxaban at 20 mg PO Qday.    Plan:  No change in regard to her anticoagulation therapy. She remains to be a good candidate to stay on indefinite anticoagulation therapy with rivaroxaban at 20 mg PO Qday dosing. I have renewed her rivaroxaban prescription today with a one year refill.     I will check her CBC and CMP in the next 1-2 weeks. She is instructed to call her nearest [facility] to schedule a lab only appointment to get these labs done in the next 1-2 weeks. I will contact her via phone or [employer] once the lab results are back.     I explain to Jackie that her oldest brother who recently  found to have DVT should have factor V Leiden mutation done if it has not already been done. And if he is found to have factor V Leiden mutation, his 2 daughters should also be tested before any consideration of starting estrogen containing OCP.     She is instructed to call our clinic if she should experience any unusual bleeding issues or if she should need any invasive procedures or surgical procedures in the future. Otherwise, I will plan to see her back in one year for follow up. Virtual or in-person visit is fine.    Call started: 14:34  Call ended: 14:40      Lizandro Zimmer PA-C, MPAS  Physician Assistant  [facility].    The longitudinal plan of care for these conditions below were addressed during this visit. Due to the added complexity in care, I will continue to support Jackie Juan  in the subsequent management of these conditions and with the ongoing continuity of care of these conditions.    Problem List Items Addressed This Visit as of 2/19/2024   Right distal lower extremity DVT on 8/31/2021, likely unprovoked.  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   Heterozygous factor V Leiden mutation.   Family history of DVT/PE.   Family history of factor V Leiden mutation.   Chronic anticoagulation therapy with rivaroxaban at 20 mg PO Qday.    23 minutes spent by me on the date of the encounter doing chart review, history and exam, documentation and further activities per the note

## 2025-07-21 ENCOUNTER — VIRTUAL VISIT (OUTPATIENT)
Dept: HEMATOLOGY | Facility: CLINIC | Age: 40
End: 2025-07-21
Attending: PHYSICIAN ASSISTANT
Payer: COMMERCIAL

## 2025-07-21 VITALS — WEIGHT: 193 LBS | BODY MASS INDEX: 28.09 KG/M2

## 2025-07-21 DIAGNOSIS — I82.461 ACUTE DEEP VEIN THROMBOSIS (DVT) OF CALF MUSCLE VEIN OF RIGHT LOWER EXTREMITY (H): ICD-10-CM

## 2025-07-21 DIAGNOSIS — I82.401 RECURRENT ACUTE DEEP VEIN THROMBOSIS (DVT) OF RIGHT LOWER EXTREMITY (H): Primary | ICD-10-CM

## 2025-07-21 DIAGNOSIS — Z82.49 FAMILY HISTORY OF DVT: ICD-10-CM

## 2025-07-21 DIAGNOSIS — D68.51 FACTOR 5 LEIDEN MUTATION, HETEROZYGOUS: ICD-10-CM

## 2025-07-21 DIAGNOSIS — Z83.2 FAMILY HISTORY OF FACTOR V LEIDEN MUTATION: ICD-10-CM

## 2025-07-21 DIAGNOSIS — Z79.01 CHRONIC ANTICOAGULATION: ICD-10-CM

## 2025-07-21 PROCEDURE — 98013 SYNCH AUDIO-ONLY EST LOW 20: CPT | Performed by: PHYSICIAN ASSISTANT

## 2025-07-21 NOTE — PATIENT INSTRUCTIONS
Jackie,    It was nice to talk to you via telephone visit earlier today.    Below is a summary of our plan:  No change in regard to your anticoagulation therapy. Please continue to take your apixaban (Eliquis) at 5 mg by mouth every 12 hours.  Please call your nearest [facility] to make a lab only appointment to get your complete blood count and comprehensive metabolic panel done in the next 1-2 weeks. I will contact you via phone or [employer] once the results are back.   Attached is some information about factor V Leiden mutation for your reference.   I would encourage that your older brother who recently has a DVT gets factor V Leiden mutation testing done. And if he is found to have it, his 2 daughters should also get tested before they consider starting any estrogen containing oral contraceptive pills.   If you should have any further questions, or experience any unusual bleeding issues or if you should need any invasive or surgical procedures in the future, please call us at [phone] and ask to speak to a nursing staff.  One of our administrative assistants will contact you to schedule your return visit with me in one year. Virtual or in-person visit is fine.     Thank you once again in choosing our clinic as part of your healthcare team.      Lizandro Zimmer PA-C, MPAS  Physician Assistant  [facility].                                 [facility] [phone]    What Is Factor V Leiden? (Pronounced factor five lye?-den)  Factor V Leiden is a genetic mutation or abnormal version of factor V that is resistant to turning off clotting.     How Did I Get Factor V Leiden?  You inherit all your genes, including factor V Leiden, from your parents. You have two copies of every gene, one from your father and one from your mother. You may have inherited one copy of the factor V Leiden gene from one of your parents, making you  heterozygous for the factor V Leiden. This means that you have about 50% of normal factor V and about 50% of abnormal factor V Leiden in your blood. Sometimes both parents pass factor V Leiden to their offspring, making you homozygous for factor V Leiden, and 100% of your factor V is abnormal.    What Are the Implications of Having Factor V Leiden?  Heterozygous factor V Leiden is found in about 5% of the white population and is most common in people of Northern  descent and in some  populations, whereas the homozygous form is found in fewer than 1%. Factor V Leiden is less common in the  populations and is rare in , , and  populations. Factor V Leiden is associated with a slight increased risk of developing a DVT (deep vein thrombosis). Approximately 1 in every 1000 people will develop a DVT or PE (pulmonary embolism) each year, and this increases from about 1 in 10,000 for those in their twenties to about 5 in 1000 for those in their seventies. Heterozygous factor V Leiden increases the risk of developing a first DVT by 5 to 7 fold (or 5 to 7 in 1000 people each year). Thus, Factor V Leiden is a weak risk factor for developing blood clots; in fact, most people who have heterozygous factor V Leiden never develop blood clots. Homozygous factor V Leiden increases the risk of developing clots to a greater degree, about 25 to 50 fold. If you have the heterozygous form of factor V Leiden, the lifetime risk of developing a DVT is 10% or less, but may be higher if you have close family members who have had a DVT. Very often, people with factor V Leiden have additional risk factors that contributed to the development of blood clots. Having factor V Leiden alone does not appear to increase the risk of developing heart attacks and strokes.    Treatment  If you have factor V Leiden but have never had a blood clot, then you will not routinely be treated with a blood  thinner. Rather, you should be counseled about reducing or eliminating other factors that may add to your risk of developing a clot in the future. In addition, you may require temporary treatment with a blood thinner during periods of particularly high risk, such as major surgery.    Special Considerations for Women  The use of estrogen, such as oral contraceptive pills (OCPs) and hormone replacement therapy (HRT) taken after menopause, increases the risk of developing DVT and PE. Healthy women taking OCPs have a 3 to 4 fold increased risk of developing a DVT or PE compared with women who do not take OCP. Women with factor V Leiden who take OCPs have about a 35 fold increased risk of developing a DVT or PE compared with women without factor V Leiden and those who do not take OCPs. This would translate to about 35 per 10,000 chance per year of use for women in their twenties with factor V Leiden. Likewise, postmenopausal women taking HRT have a 2 to 3 fold higher risk of developing a DVT or PE than women who do not take HRT, and women with factor V Leiden who take HRT have a 15 fold higher risk. This is about a 15 to 40 per 1000 chance per year of use for women in their fifties with factor V Leiden. Women with heterozygous factor V Leiden who are making decisions about using OCP or HRT should consider these statistics when weighing the risks and benefits of treatment.    Pregnancy  Factor V Leiden increases the risk of developing a DVT during pregnancy by about 7 fold. Women with factor V Leiden who are planning pregnancy should discuss this with their obstetrician and/or hematologist. Most women with factor V Leiden have normal pregnancies and only require close follow-up during pregnancy. For those with a history of DVT or PE, treatment with an anticoagulant during a subsequent pregnancy can prevent recurrent problems.    Adapted from article, Factor V Leiden, Circulation April 22, 2003

## 2025-07-31 ENCOUNTER — LAB (OUTPATIENT)
Dept: LAB | Facility: CLINIC | Age: 40
End: 2025-07-31
Payer: COMMERCIAL

## 2025-07-31 DIAGNOSIS — Z79.01 CHRONIC ANTICOAGULATION: ICD-10-CM

## 2025-07-31 DIAGNOSIS — I82.401 RECURRENT ACUTE DEEP VEIN THROMBOSIS (DVT) OF RIGHT LOWER EXTREMITY (H): ICD-10-CM

## 2025-07-31 DIAGNOSIS — E03.9 HYPOTHYROIDISM: ICD-10-CM

## 2025-07-31 DIAGNOSIS — Z13.6 SCREENING FOR CARDIOVASCULAR CONDITION: Primary | ICD-10-CM

## 2025-07-31 DIAGNOSIS — Z82.49 FAMILY HISTORY OF DVT: ICD-10-CM

## 2025-07-31 DIAGNOSIS — D68.51 FACTOR 5 LEIDEN MUTATION, HETEROZYGOUS: ICD-10-CM

## 2025-07-31 DIAGNOSIS — Z83.2 FAMILY HISTORY OF FACTOR V LEIDEN MUTATION: ICD-10-CM

## 2025-07-31 LAB
ALBUMIN SERPL BCG-MCNC: 4.1 G/DL (ref 3.5–5.2)
ALP SERPL-CCNC: 68 U/L (ref 40–150)
ALT SERPL W P-5'-P-CCNC: 10 U/L (ref 0–50)
ANION GAP SERPL CALCULATED.3IONS-SCNC: 9 MMOL/L (ref 7–15)
AST SERPL W P-5'-P-CCNC: 20 U/L (ref 0–45)
BILIRUB SERPL-MCNC: 0.8 MG/DL
BUN SERPL-MCNC: 11.8 MG/DL (ref 6–20)
CALCIUM SERPL-MCNC: 9.4 MG/DL (ref 8.8–10.4)
CHLORIDE SERPL-SCNC: 106 MMOL/L (ref 98–107)
CHOLEST SERPL-MCNC: 163 MG/DL
CREAT SERPL-MCNC: 0.77 MG/DL (ref 0.51–0.95)
EGFRCR SERPLBLD CKD-EPI 2021: >90 ML/MIN/1.73M2
ERYTHROCYTE [DISTWIDTH] IN BLOOD BY AUTOMATED COUNT: 12.1 % (ref 10–15)
FASTING STATUS PATIENT QL REPORTED: NO
FASTING STATUS PATIENT QL REPORTED: NO
GLUCOSE SERPL-MCNC: 95 MG/DL (ref 70–99)
HCO3 SERPL-SCNC: 24 MMOL/L (ref 22–29)
HCT VFR BLD AUTO: 39.7 % (ref 35–47)
HDLC SERPL-MCNC: 71 MG/DL
HGB BLD-MCNC: 13.4 G/DL (ref 11.7–15.7)
LDLC SERPL CALC-MCNC: 83 MG/DL
MCH RBC QN AUTO: 30.3 PG (ref 26.5–33)
MCHC RBC AUTO-ENTMCNC: 33.8 G/DL (ref 31.5–36.5)
MCV RBC AUTO: 90 FL (ref 78–100)
NONHDLC SERPL-MCNC: 92 MG/DL
PLATELET # BLD AUTO: 226 10E3/UL (ref 150–450)
POTASSIUM SERPL-SCNC: 4.3 MMOL/L (ref 3.4–5.3)
PROT SERPL-MCNC: 7 G/DL (ref 6.4–8.3)
RBC # BLD AUTO: 4.42 10E6/UL (ref 3.8–5.2)
SODIUM SERPL-SCNC: 139 MMOL/L (ref 135–145)
TRIGL SERPL-MCNC: 46 MG/DL
TSH SERPL DL<=0.005 MIU/L-ACNC: 3.96 UIU/ML (ref 0.3–4.2)
WBC # BLD AUTO: 4.6 10E3/UL (ref 4–11)

## (undated) DEVICE — ENDO SCOPE WARMER SEAL  C3101

## (undated) DEVICE — SU MONOCRYL 4-0 PS-2 27" UND Y426H

## (undated) DEVICE — CATH TRAY FOLEY 16FR W/URINE METER STATLOCK 303316A

## (undated) DEVICE — ESU ENDO SCISSORS 5MM CVD 5DCS

## (undated) DEVICE — SU VICRYL 0 UR-6 27" J603H

## (undated) DEVICE — LINEN TOWEL PACK X6 WHITE 5487

## (undated) DEVICE — TUBING SMOKE EVAC PNEUMOCLEAR HIGH FLOW 0620050250

## (undated) DEVICE — BLADE KNIFE SURG 15 371115

## (undated) DEVICE — ESU LIGASURE LAPAROSCOPIC BLUNT TIP SEALER 5MMX37CM LF1837

## (undated) DEVICE — BLADE CLIPPER SGL USE 9680

## (undated) DEVICE — SU DERMABOND ADVANCED .7ML DNX12

## (undated) DEVICE — SOL NACL 0.9% INJ 1000ML BAG 2B1324X

## (undated) DEVICE — SOL NACL 0.9% IRRIG 1000ML BOTTLE 2F7124

## (undated) DEVICE — PREP SCRUB SOL EXIDINE 4% CHG 4OZ 29002-404

## (undated) DEVICE — GLOVE BIOGEL PI MICRO INDICATOR UNDERGLOVE SZ 7.0 48970

## (undated) DEVICE — SUCTION MANIFOLD NEPTUNE 2 SYS 4 PORT 0702-020-000

## (undated) DEVICE — ENDO POUCH UNIVERSAL RETRIEVAL SYSTEM INZII 12/15MM CD004

## (undated) DEVICE — PREP CHLORAPREP 26ML TINTED HI-LITE ORANGE 930815

## (undated) DEVICE — ESU GROUND PAD ADULT W/CORD E7507

## (undated) DEVICE — ENDO ACCESS PLATFORM GELPOINT SGL INCISION CNGL2

## (undated) DEVICE — PROTECTOR ARM ONE-STEP TRENDELENBURG 40418

## (undated) DEVICE — SOL WATER IRRIG 1000ML BOTTLE 2F7114

## (undated) DEVICE — WIPES FOLEY CARE SURESTEP PROVON DFC100

## (undated) DEVICE — Device

## (undated) DEVICE — SPECIMEN TRAP MUCOUS 40ML LUKI C30200A

## (undated) DEVICE — KIT PATIENT POSITIONING PIGAZZI LATEX FREE 40580

## (undated) DEVICE — ESU PENCIL SMOKE EVAC W/ROCKER SWITCH 0703-047-000

## (undated) DEVICE — SUCTION IRR STRYKERFLOW II W/TIP 250-070-520

## (undated) DEVICE — NDL INSUFFLATION 13GA 120MM C2201

## (undated) DEVICE — GLOVE BIOGEL PI MICRO SZ 6.5 48565

## (undated) DEVICE — LINEN TOWEL PACK X30 5481

## (undated) DEVICE — SU VICRYL 0 TIE 54" J608H

## (undated) RX ORDER — ACETAMINOPHEN 325 MG/1
TABLET ORAL
Status: DISPENSED
Start: 2024-01-26

## (undated) RX ORDER — BUPIVACAINE HYDROCHLORIDE 2.5 MG/ML
INJECTION, SOLUTION EPIDURAL; INFILTRATION; INTRACAUDAL
Status: DISPENSED
Start: 2024-01-26

## (undated) RX ORDER — FENTANYL CITRATE 50 UG/ML
INJECTION, SOLUTION INTRAMUSCULAR; INTRAVENOUS
Status: DISPENSED
Start: 2024-01-26

## (undated) RX ORDER — HEPARIN SODIUM 5000 [USP'U]/.5ML
INJECTION, SOLUTION INTRAVENOUS; SUBCUTANEOUS
Status: DISPENSED
Start: 2024-01-26